# Patient Record
Sex: FEMALE | Race: WHITE | Employment: OTHER | URBAN - METROPOLITAN AREA
[De-identification: names, ages, dates, MRNs, and addresses within clinical notes are randomized per-mention and may not be internally consistent; named-entity substitution may affect disease eponyms.]

---

## 2017-02-15 ENCOUNTER — OFFICE VISIT (OUTPATIENT)
Dept: INTERNAL MEDICINE | Age: 72
End: 2017-02-15

## 2017-02-15 VITALS
BODY MASS INDEX: 25.52 KG/M2 | WEIGHT: 130 LBS | DIASTOLIC BLOOD PRESSURE: 70 MMHG | SYSTOLIC BLOOD PRESSURE: 122 MMHG | RESPIRATION RATE: 12 BRPM | HEIGHT: 60 IN | HEART RATE: 70 BPM

## 2017-02-15 DIAGNOSIS — R12 HEART BURN: Primary | ICD-10-CM

## 2017-02-15 PROCEDURE — 99213 OFFICE O/P EST LOW 20 MIN: CPT | Performed by: INTERNAL MEDICINE

## 2017-02-15 RX ORDER — RANITIDINE 150 MG/1
150 TABLET ORAL PRN
COMMUNITY
End: 2018-05-15

## 2017-02-15 RX ORDER — OMEPRAZOLE 20 MG/1
20 CAPSULE, DELAYED RELEASE ORAL DAILY
Qty: 90 CAPSULE | Refills: 3 | Status: SHIPPED | OUTPATIENT
Start: 2017-02-15 | End: 2018-05-15

## 2017-03-07 RX ORDER — LORAZEPAM 1 MG/1
TABLET ORAL
Qty: 90 TABLET | Refills: 0 | Status: SHIPPED | OUTPATIENT
Start: 2017-03-07 | End: 2017-07-11 | Stop reason: SDUPTHER

## 2017-03-28 ENCOUNTER — TELEPHONE (OUTPATIENT)
Dept: INTERNAL MEDICINE | Age: 72
End: 2017-03-28

## 2017-04-26 ENCOUNTER — TELEPHONE (OUTPATIENT)
Dept: INTERNAL MEDICINE | Age: 72
End: 2017-04-26

## 2017-06-02 ENCOUNTER — TELEPHONE (OUTPATIENT)
Dept: INTERNAL MEDICINE | Age: 72
End: 2017-06-02

## 2017-07-11 RX ORDER — LORAZEPAM 1 MG/1
TABLET ORAL
Qty: 90 TABLET | Refills: 0 | Status: SHIPPED | OUTPATIENT
Start: 2017-07-11 | End: 2017-12-23 | Stop reason: SDUPTHER

## 2017-09-14 RX ORDER — ATORVASTATIN CALCIUM 20 MG/1
TABLET, FILM COATED ORAL
Qty: 90 TABLET | Refills: 3 | Status: SHIPPED | OUTPATIENT
Start: 2017-09-14 | End: 2018-10-07 | Stop reason: SDUPTHER

## 2017-10-18 ENCOUNTER — OFFICE VISIT (OUTPATIENT)
Dept: INTERNAL MEDICINE | Age: 72
End: 2017-10-18

## 2017-10-18 VITALS
RESPIRATION RATE: 12 BRPM | DIASTOLIC BLOOD PRESSURE: 70 MMHG | SYSTOLIC BLOOD PRESSURE: 116 MMHG | HEART RATE: 70 BPM | HEIGHT: 60 IN | WEIGHT: 127 LBS | BODY MASS INDEX: 24.94 KG/M2

## 2017-10-18 DIAGNOSIS — Z00.00 MEDICARE ANNUAL WELLNESS VISIT, SUBSEQUENT: Primary | ICD-10-CM

## 2017-10-18 DIAGNOSIS — Z12.11 SPECIAL SCREENING FOR MALIGNANT NEOPLASMS, COLON: ICD-10-CM

## 2017-10-18 DIAGNOSIS — Z13.29 SCREENING FOR THYROID DISORDER: ICD-10-CM

## 2017-10-18 DIAGNOSIS — E78.5 HYPERLIPIDEMIA, UNSPECIFIED HYPERLIPIDEMIA TYPE: ICD-10-CM

## 2017-10-18 DIAGNOSIS — E55.9 VITAMIN D DEFICIENCY: ICD-10-CM

## 2017-10-18 LAB
BILIRUBIN, POC: NORMAL
BLOOD URINE, POC: NORMAL
CLARITY, POC: CLEAR
COLOR, POC: YELLOW
GLUCOSE URINE, POC: NORMAL
KETONES, POC: NORMAL
LEUKOCYTE EST, POC: NORMAL
NITRITE, POC: NORMAL
PH, POC: 5
PROTEIN, POC: NORMAL
SPECIFIC GRAVITY, POC: 1.02
UROBILINOGEN, POC: NORMAL

## 2017-10-18 PROCEDURE — 93000 ELECTROCARDIOGRAM COMPLETE: CPT | Performed by: INTERNAL MEDICINE

## 2017-10-18 PROCEDURE — G0439 PPPS, SUBSEQ VISIT: HCPCS | Performed by: INTERNAL MEDICINE

## 2017-10-18 PROCEDURE — 81002 URINALYSIS NONAUTO W/O SCOPE: CPT | Performed by: INTERNAL MEDICINE

## 2017-10-18 RX ORDER — CALCIUM CARBONATE 200(500)MG
1 TABLET,CHEWABLE ORAL DAILY
COMMUNITY
End: 2019-11-20

## 2017-10-18 ASSESSMENT — PATIENT HEALTH QUESTIONNAIRE - PHQ9
SUM OF ALL RESPONSES TO PHQ9 QUESTIONS 1 & 2: 0
SUM OF ALL RESPONSES TO PHQ QUESTIONS 1-9: 0
1. LITTLE INTEREST OR PLEASURE IN DOING THINGS: 0
2. FEELING DOWN, DEPRESSED OR HOPELESS: 0
SUM OF ALL RESPONSES TO PHQ QUESTIONS 1-9: 0

## 2017-10-18 ASSESSMENT — LIFESTYLE VARIABLES
HOW OFTEN DO YOU HAVE SIX OR MORE DRINKS ON ONE OCCASION: 0
HOW OFTEN DO YOU HAVE A DRINK CONTAINING ALCOHOL: 2
HOW OFTEN DURING THE LAST YEAR HAVE YOU HAD A FEELING OF GUILT OR REMORSE AFTER DRINKING: 0
HAVE YOU OR SOMEONE ELSE BEEN INJURED AS A RESULT OF YOUR DRINKING: 0
HOW MANY STANDARD DRINKS CONTAINING ALCOHOL DO YOU HAVE ON A TYPICAL DAY: 0
HOW OFTEN DURING THE LAST YEAR HAVE YOU NEEDED AN ALCOHOLIC DRINK FIRST THING IN THE MORNING TO GET YOURSELF GOING AFTER A NIGHT OF HEAVY DRINKING: 0
HOW OFTEN DURING THE LAST YEAR HAVE YOU FAILED TO DO WHAT WAS NORMALLY EXPECTED FROM YOU BECAUSE OF DRINKING: 0
AUDIT-C TOTAL SCORE: 2
HAS A RELATIVE, FRIEND, DOCTOR, OR ANOTHER HEALTH PROFESSIONAL EXPRESSED CONCERN ABOUT YOUR DRINKING OR SUGGESTED YOU CUT DOWN: 0
AUDIT TOTAL SCORE: 2
HOW OFTEN DURING THE LAST YEAR HAVE YOU FOUND THAT YOU WERE NOT ABLE TO STOP DRINKING ONCE YOU HAD STARTED: 0
HOW OFTEN DURING THE LAST YEAR HAVE YOU BEEN UNABLE TO REMEMBER WHAT HAPPENED THE NIGHT BEFORE BECAUSE YOU HAD BEEN DRINKING: 0

## 2017-10-18 ASSESSMENT — ANXIETY QUESTIONNAIRES: GAD7 TOTAL SCORE: 1

## 2017-10-18 NOTE — PROGRESS NOTES
 LORazepam (ATIVAN) 1 MG tablet TAKE ONE TABLET BY MOUTH EVERY EVENING 90 tablet 0    Calcium Carbonate-Vitamin D (CALCIUM + D) 600-200 MG-UNIT TABS Take  by mouth 2 times daily.  atorvastatin (LIPITOR) 20 MG tablet TAKE ONE TABLET BY MOUTH DAILY 90 tablet 3    ranitidine (ZANTAC) 150 MG tablet Take 150 mg by mouth as needed for Heartburn      omeprazole (PRILOSEC) 20 MG delayed release capsule Take 1 capsule by mouth daily 90 capsule 3     No current facility-administered medications for this visit. No Known Allergies    Review of Systems:     Immunization History   Administered Date(s) Administered    Influenza, High Dose 10/15/2017    Pneumococcal 13-valent Conjugate ([id number]) 08/07/2016    Pneumococcal Polysaccharide ([id number]) 02/23/2011, 10/15/2017    Td 04/15/2008     Eye Exam:  November, 2017 by Dr. Perri Del Real  Chest: Denies: cough, hemoptysis, shortness of breath, pleuritic chest pain, wheezing  Cardiovascular: Denies: chest pain, dyspnea on exertion, orthopnea, paroxysmal nocturnal dyspnea, edema, palpitations  Abdomen: no abdominal pain, change in bowel habits, or black or bloody stools  Colonoscopy:  October, 2013 by Dr. Meliton Alvarado was normal. To be repeated 2023. Fall Risk low  ADL without assistance   Mammography:March 8, 2017   DEXA: July, 2014 revealed a lumbar T score of - 1.7 and a hip T score of - 1.9   Pelvic and PAP: 2016 by Dr. Kenna Schuster   Other:  N/A      Physical Exam:  General appearance: alert, appears stated age and cooperative  /70 (Site: Left Arm, Position: Sitting, Cuff Size: Medium Adult)   Pulse 70   Resp 12   Ht 4' 11.5\" (1.511 m)   Wt 127 lb (57.6 kg)   BMI 25.22 kg/m²   Eyes: conjunctivae/corneas clear. PERRL, EOM's intact. Fundi benign. Ears: normal TM's and external ear canals both ears  Nose: Nares normal. Septum midline. Mucosa normal. No drainage or sinus tenderness.   Throat: no abnormalities  Neck: no adenopathy, no carotid bruit, no JVD, supple, symmetrical, trachea midline and thyroid not enlarged, symmetric, no tenderness/mass/nodules  Nodes:no adenopathy palpable  Breasts:Breasts symmetrical, skin without lesion(s), no nipple retraction or dimpling, no nipple discharge, no masses palpated, no axillary or supraclavicular adenopathy  Lungs: clear to auscultation bilaterally  Heart: regular rate and rhythm, S1, S2 normal, no murmur, click, rub or gallop  Abdomen: soft, non-tender; bowel sounds normal; no masses,  no organomegaly  Extremities: extremities normal, atraumatic, no cyanosis or edema  Neurological: Gait normal. Reflexes normal and symmetric.  Sensation grossly normal  Pulse: radial=4/4, femoral=4/4, popliteal=4/4, dorsalis pedis=4/4,   Skin: normal coloration and turgor, no rashes, no suspicious skin lesions noted  Psych: normal    Lab Review: not applicable  Assessment: Stable     Plan:              ECG, U/A, fasting labs, hemoccults  Hyperlipidemia - await labs, same regimen pending results  Vitamin D deficiency - stable, continue same regimen       Sammie Kehr, MD

## 2017-10-26 DIAGNOSIS — Z00.00 PERIODIC HEALTH ASSESSMENT, GENERAL SCREENING, ADULT: Primary | ICD-10-CM

## 2017-10-26 DIAGNOSIS — E55.9 VITAMIN D DEFICIENCY: ICD-10-CM

## 2017-10-26 DIAGNOSIS — E78.5 HYPERLIPIDEMIA, UNSPECIFIED HYPERLIPIDEMIA TYPE: ICD-10-CM

## 2017-10-30 DIAGNOSIS — E55.9 VITAMIN D DEFICIENCY: ICD-10-CM

## 2017-10-30 DIAGNOSIS — E78.5 HYPERLIPIDEMIA, UNSPECIFIED HYPERLIPIDEMIA TYPE: ICD-10-CM

## 2017-10-30 DIAGNOSIS — Z00.00 PERIODIC HEALTH ASSESSMENT, GENERAL SCREENING, ADULT: ICD-10-CM

## 2017-10-30 LAB
A/G RATIO: 2.3 (ref 1.1–2.2)
ALBUMIN SERPL-MCNC: 4.3 G/DL (ref 3.4–5)
ALP BLD-CCNC: 67 U/L (ref 40–129)
ALT SERPL-CCNC: 18 U/L (ref 10–40)
ANION GAP SERPL CALCULATED.3IONS-SCNC: 13 MMOL/L (ref 3–16)
AST SERPL-CCNC: 18 U/L (ref 15–37)
BASOPHILS ABSOLUTE: 0 K/UL (ref 0–0.2)
BASOPHILS RELATIVE PERCENT: 1 %
BILIRUB SERPL-MCNC: 0.3 MG/DL (ref 0–1)
BUN BLDV-MCNC: 17 MG/DL (ref 7–20)
CALCIUM SERPL-MCNC: 9.6 MG/DL (ref 8.3–10.6)
CHLORIDE BLD-SCNC: 103 MMOL/L (ref 99–110)
CHOLESTEROL, TOTAL: 188 MG/DL (ref 0–199)
CO2: 28 MMOL/L (ref 21–32)
CREAT SERPL-MCNC: 0.5 MG/DL (ref 0.6–1.2)
EOSINOPHILS ABSOLUTE: 0.1 K/UL (ref 0–0.6)
EOSINOPHILS RELATIVE PERCENT: 2.5 %
GFR AFRICAN AMERICAN: >60
GFR NON-AFRICAN AMERICAN: >60
GLOBULIN: 1.9 G/DL
GLUCOSE BLD-MCNC: 94 MG/DL (ref 70–99)
HCT VFR BLD CALC: 41.4 % (ref 36–48)
HDLC SERPL-MCNC: 58 MG/DL (ref 40–60)
HEMOGLOBIN: 13.4 G/DL (ref 12–16)
LDL CHOLESTEROL CALCULATED: 100 MG/DL
LYMPHOCYTES ABSOLUTE: 2 K/UL (ref 1–5.1)
LYMPHOCYTES RELATIVE PERCENT: 41 %
MCH RBC QN AUTO: 28.4 PG (ref 26–34)
MCHC RBC AUTO-ENTMCNC: 32.3 G/DL (ref 31–36)
MCV RBC AUTO: 88.1 FL (ref 80–100)
MONOCYTES ABSOLUTE: 0.5 K/UL (ref 0–1.3)
MONOCYTES RELATIVE PERCENT: 10.3 %
NEUTROPHILS ABSOLUTE: 2.2 K/UL (ref 1.7–7.7)
NEUTROPHILS RELATIVE PERCENT: 45.2 %
PDW BLD-RTO: 13.8 % (ref 12.4–15.4)
PLATELET # BLD: 195 K/UL (ref 135–450)
PMV BLD AUTO: 9.2 FL (ref 5–10.5)
POTASSIUM SERPL-SCNC: 4.9 MMOL/L (ref 3.5–5.1)
RBC # BLD: 4.7 M/UL (ref 4–5.2)
SODIUM BLD-SCNC: 144 MMOL/L (ref 136–145)
TOTAL CK: 57 U/L (ref 26–192)
TOTAL PROTEIN: 6.2 G/DL (ref 6.4–8.2)
TRIGL SERPL-MCNC: 149 MG/DL (ref 0–150)
TSH SERPL DL<=0.05 MIU/L-ACNC: 2.1 UIU/ML (ref 0.27–4.2)
VLDLC SERPL CALC-MCNC: 30 MG/DL
WBC # BLD: 4.8 K/UL (ref 4–11)

## 2017-11-03 DIAGNOSIS — Z12.11 SPECIAL SCREENING FOR MALIGNANT NEOPLASMS, COLON: ICD-10-CM

## 2017-11-03 LAB
CONTROL: NORMAL
HEMOCCULT STL QL: NEGATIVE

## 2017-12-26 RX ORDER — LORAZEPAM 1 MG/1
TABLET ORAL
Qty: 90 TABLET | Refills: 0 | Status: SHIPPED | OUTPATIENT
Start: 2017-12-26 | End: 2018-09-27 | Stop reason: SDUPTHER

## 2018-04-09 ENCOUNTER — TELEPHONE (OUTPATIENT)
Dept: INTERNAL MEDICINE | Age: 73
End: 2018-04-09

## 2018-04-17 ENCOUNTER — TELEPHONE (OUTPATIENT)
Dept: INTERNAL MEDICINE | Age: 73
End: 2018-04-17

## 2018-05-15 ENCOUNTER — OFFICE VISIT (OUTPATIENT)
Dept: ENT CLINIC | Age: 73
End: 2018-05-15

## 2018-05-15 VITALS
WEIGHT: 130.2 LBS | BODY MASS INDEX: 25.56 KG/M2 | TEMPERATURE: 97.3 F | HEART RATE: 85 BPM | DIASTOLIC BLOOD PRESSURE: 70 MMHG | SYSTOLIC BLOOD PRESSURE: 119 MMHG | HEIGHT: 60 IN

## 2018-05-15 DIAGNOSIS — H61.21 IMPACTED CERUMEN OF RIGHT EAR: Primary | ICD-10-CM

## 2018-05-15 DIAGNOSIS — H90.2 EXTERNAL EAR CONDUCTIVE HEARING LOSS: ICD-10-CM

## 2018-05-15 PROCEDURE — 69210 REMOVE IMPACTED EAR WAX UNI: CPT | Performed by: OTOLARYNGOLOGY

## 2018-09-27 DIAGNOSIS — F41.9 ANXIETY: Primary | ICD-10-CM

## 2018-09-27 RX ORDER — LORAZEPAM 1 MG/1
TABLET ORAL
Qty: 90 TABLET | Refills: 0 | Status: SHIPPED | OUTPATIENT
Start: 2018-09-27 | End: 2018-12-26

## 2018-10-07 RX ORDER — ATORVASTATIN CALCIUM 20 MG/1
TABLET, FILM COATED ORAL
Qty: 90 TABLET | Refills: 2 | Status: SHIPPED | OUTPATIENT
Start: 2018-10-07 | End: 2019-07-20 | Stop reason: SDUPTHER

## 2018-10-26 ENCOUNTER — OFFICE VISIT (OUTPATIENT)
Dept: INTERNAL MEDICINE CLINIC | Age: 73
End: 2018-10-26
Payer: MEDICARE

## 2018-10-26 VITALS
RESPIRATION RATE: 12 BRPM | WEIGHT: 134 LBS | HEART RATE: 80 BPM | SYSTOLIC BLOOD PRESSURE: 112 MMHG | DIASTOLIC BLOOD PRESSURE: 66 MMHG | BODY MASS INDEX: 26.31 KG/M2 | HEIGHT: 60 IN

## 2018-10-26 DIAGNOSIS — Z00.00 MEDICARE ANNUAL WELLNESS VISIT, SUBSEQUENT: Primary | ICD-10-CM

## 2018-10-26 DIAGNOSIS — Z00.00 PERIODIC HEALTH ASSESSMENT, GENERAL SCREENING, ADULT: ICD-10-CM

## 2018-10-26 DIAGNOSIS — Z12.11 SPECIAL SCREENING FOR MALIGNANT NEOPLASM OF COLON: ICD-10-CM

## 2018-10-26 DIAGNOSIS — E78.5 HYPERLIPIDEMIA, UNSPECIFIED HYPERLIPIDEMIA TYPE: ICD-10-CM

## 2018-10-26 PROCEDURE — 93000 ELECTROCARDIOGRAM COMPLETE: CPT | Performed by: INTERNAL MEDICINE

## 2018-10-26 PROCEDURE — G0439 PPPS, SUBSEQ VISIT: HCPCS | Performed by: INTERNAL MEDICINE

## 2018-10-26 PROCEDURE — 81002 URINALYSIS NONAUTO W/O SCOPE: CPT | Performed by: INTERNAL MEDICINE

## 2018-10-26 ASSESSMENT — LIFESTYLE VARIABLES
HAS A RELATIVE, FRIEND, DOCTOR, OR ANOTHER HEALTH PROFESSIONAL EXPRESSED CONCERN ABOUT YOUR DRINKING OR SUGGESTED YOU CUT DOWN: 0
HOW OFTEN DO YOU HAVE A DRINK CONTAINING ALCOHOL: 2
AUDIT TOTAL SCORE: 2
HOW OFTEN DURING THE LAST YEAR HAVE YOU FAILED TO DO WHAT WAS NORMALLY EXPECTED FROM YOU BECAUSE OF DRINKING: 0
HOW MANY STANDARD DRINKS CONTAINING ALCOHOL DO YOU HAVE ON A TYPICAL DAY: 0
HOW OFTEN DURING THE LAST YEAR HAVE YOU HAD A FEELING OF GUILT OR REMORSE AFTER DRINKING: 0
HAVE YOU OR SOMEONE ELSE BEEN INJURED AS A RESULT OF YOUR DRINKING: 0
HOW OFTEN DO YOU HAVE SIX OR MORE DRINKS ON ONE OCCASION: 0
AUDIT-C TOTAL SCORE: 2
HOW OFTEN DURING THE LAST YEAR HAVE YOU BEEN UNABLE TO REMEMBER WHAT HAPPENED THE NIGHT BEFORE BECAUSE YOU HAD BEEN DRINKING: 0
HOW OFTEN DURING THE LAST YEAR HAVE YOU NEEDED AN ALCOHOLIC DRINK FIRST THING IN THE MORNING TO GET YOURSELF GOING AFTER A NIGHT OF HEAVY DRINKING: 0
HOW OFTEN DURING THE LAST YEAR HAVE YOU FOUND THAT YOU WERE NOT ABLE TO STOP DRINKING ONCE YOU HAD STARTED: 0

## 2018-10-26 ASSESSMENT — PATIENT HEALTH QUESTIONNAIRE - PHQ9
SUM OF ALL RESPONSES TO PHQ QUESTIONS 1-9: 0
SUM OF ALL RESPONSES TO PHQ QUESTIONS 1-9: 0

## 2018-10-26 ASSESSMENT — ANXIETY QUESTIONNAIRES: GAD7 TOTAL SCORE: 2

## 2018-11-12 DIAGNOSIS — Z12.11 SPECIAL SCREENING FOR MALIGNANT NEOPLASMS, COLON: Primary | ICD-10-CM

## 2018-11-20 DIAGNOSIS — Z12.11 SPECIAL SCREENING FOR MALIGNANT NEOPLASMS, COLON: ICD-10-CM

## 2018-11-20 LAB
CONTROL: NORMAL
HEMOCCULT STL QL: NORMAL

## 2018-11-20 PROCEDURE — 82274 ASSAY TEST FOR BLOOD FECAL: CPT | Performed by: INTERNAL MEDICINE

## 2018-12-05 DIAGNOSIS — M81.0 OSTEOPOROSIS, UNSPECIFIED OSTEOPOROSIS TYPE, UNSPECIFIED PATHOLOGICAL FRACTURE PRESENCE: Primary | ICD-10-CM

## 2018-12-05 DIAGNOSIS — E78.5 HYPERLIPIDEMIA, UNSPECIFIED HYPERLIPIDEMIA TYPE: ICD-10-CM

## 2018-12-05 DIAGNOSIS — Z00.00 PERIODIC HEALTH ASSESSMENT, GENERAL SCREENING, ADULT: ICD-10-CM

## 2018-12-05 LAB
A/G RATIO: 3.2 (ref 1.1–2.2)
ALBUMIN SERPL-MCNC: 4.8 G/DL (ref 3.4–5)
ALP BLD-CCNC: 69 U/L (ref 40–129)
ALT SERPL-CCNC: 22 U/L (ref 10–40)
ANION GAP SERPL CALCULATED.3IONS-SCNC: 16 MMOL/L (ref 3–16)
AST SERPL-CCNC: 21 U/L (ref 15–37)
BASOPHILS ABSOLUTE: 0 K/UL (ref 0–0.2)
BASOPHILS RELATIVE PERCENT: 1 %
BILIRUB SERPL-MCNC: 0.4 MG/DL (ref 0–1)
BUN BLDV-MCNC: 13 MG/DL (ref 7–20)
CALCIUM SERPL-MCNC: 9.7 MG/DL (ref 8.3–10.6)
CHLORIDE BLD-SCNC: 102 MMOL/L (ref 99–110)
CHOLESTEROL, TOTAL: 197 MG/DL (ref 0–199)
CO2: 25 MMOL/L (ref 21–32)
CREAT SERPL-MCNC: 0.7 MG/DL (ref 0.6–1.2)
EOSINOPHILS ABSOLUTE: 0.1 K/UL (ref 0–0.6)
EOSINOPHILS RELATIVE PERCENT: 1.7 %
GFR AFRICAN AMERICAN: >60
GFR NON-AFRICAN AMERICAN: >60
GLOBULIN: 1.5 G/DL
GLUCOSE BLD-MCNC: 94 MG/DL (ref 70–99)
HCT VFR BLD CALC: 39.6 % (ref 36–48)
HDLC SERPL-MCNC: 62 MG/DL (ref 40–60)
HEMOGLOBIN: 12.9 G/DL (ref 12–16)
LDL CHOLESTEROL CALCULATED: 113 MG/DL
LYMPHOCYTES ABSOLUTE: 1.8 K/UL (ref 1–5.1)
LYMPHOCYTES RELATIVE PERCENT: 40.6 %
MCH RBC QN AUTO: 28.7 PG (ref 26–34)
MCHC RBC AUTO-ENTMCNC: 32.5 G/DL (ref 31–36)
MCV RBC AUTO: 88.3 FL (ref 80–100)
MONOCYTES ABSOLUTE: 0.5 K/UL (ref 0–1.3)
MONOCYTES RELATIVE PERCENT: 10.5 %
NEUTROPHILS ABSOLUTE: 2.1 K/UL (ref 1.7–7.7)
NEUTROPHILS RELATIVE PERCENT: 46.2 %
PDW BLD-RTO: 14.3 % (ref 12.4–15.4)
PLATELET # BLD: 201 K/UL (ref 135–450)
PMV BLD AUTO: 9 FL (ref 5–10.5)
POTASSIUM SERPL-SCNC: 4.7 MMOL/L (ref 3.5–5.1)
RBC # BLD: 4.49 M/UL (ref 4–5.2)
SODIUM BLD-SCNC: 143 MMOL/L (ref 136–145)
TOTAL CK: 75 U/L (ref 26–192)
TOTAL PROTEIN: 6.3 G/DL (ref 6.4–8.2)
TRIGL SERPL-MCNC: 109 MG/DL (ref 0–150)
TSH SERPL DL<=0.05 MIU/L-ACNC: 1.69 UIU/ML (ref 0.27–4.2)
VLDLC SERPL CALC-MCNC: 22 MG/DL
WBC # BLD: 4.5 K/UL (ref 4–11)

## 2019-01-11 ENCOUNTER — TELEPHONE (OUTPATIENT)
Dept: INTERNAL MEDICINE CLINIC | Age: 74
End: 2019-01-11

## 2019-02-05 ENCOUNTER — HOSPITAL ENCOUNTER (OUTPATIENT)
Dept: GENERAL RADIOLOGY | Age: 74
Discharge: HOME | End: 2019-02-05
Payer: MEDICARE

## 2019-02-05 ENCOUNTER — HOSPITAL ENCOUNTER (OUTPATIENT)
Age: 74
Discharge: HOME | End: 2019-02-05
Payer: MEDICARE

## 2019-02-05 ENCOUNTER — OFFICE VISIT (OUTPATIENT)
Dept: INTERNAL MEDICINE CLINIC | Age: 74
End: 2019-02-05
Payer: MEDICARE

## 2019-02-05 VITALS
BODY MASS INDEX: 26.11 KG/M2 | HEIGHT: 60 IN | RESPIRATION RATE: 12 BRPM | WEIGHT: 133 LBS | SYSTOLIC BLOOD PRESSURE: 126 MMHG | HEART RATE: 68 BPM | DIASTOLIC BLOOD PRESSURE: 78 MMHG

## 2019-02-05 DIAGNOSIS — M54.9 BACK PAIN, UNSPECIFIED BACK LOCATION, UNSPECIFIED BACK PAIN LATERALITY, UNSPECIFIED CHRONICITY: Primary | ICD-10-CM

## 2019-02-05 DIAGNOSIS — M54.9 BACK PAIN, UNSPECIFIED BACK LOCATION, UNSPECIFIED BACK PAIN LATERALITY, UNSPECIFIED CHRONICITY: ICD-10-CM

## 2019-02-05 PROCEDURE — 99213 OFFICE O/P EST LOW 20 MIN: CPT | Performed by: INTERNAL MEDICINE

## 2019-02-05 PROCEDURE — 72100 X-RAY EXAM L-S SPINE 2/3 VWS: CPT

## 2019-02-05 RX ORDER — LORAZEPAM 1 MG/1
1 TABLET ORAL EVERY 6 HOURS PRN
COMMUNITY
End: 2019-04-25 | Stop reason: SDUPTHER

## 2019-02-07 DIAGNOSIS — M54.50 ACUTE LOW BACK PAIN WITHOUT SCIATICA, UNSPECIFIED BACK PAIN LATERALITY: ICD-10-CM

## 2019-02-07 DIAGNOSIS — M47.817 ARTHRITIS OF LUMBOSACRAL SPINE: Primary | ICD-10-CM

## 2019-04-25 DIAGNOSIS — F41.9 ANXIETY: Primary | ICD-10-CM

## 2019-04-25 RX ORDER — LORAZEPAM 1 MG/1
TABLET ORAL
Qty: 90 TABLET | Refills: 0 | Status: SHIPPED | OUTPATIENT
Start: 2019-04-25 | End: 2019-07-24

## 2019-06-24 ENCOUNTER — TELEPHONE (OUTPATIENT)
Dept: INTERNAL MEDICINE CLINIC | Age: 74
End: 2019-06-24

## 2019-06-24 NOTE — TELEPHONE ENCOUNTER
She got a letter saying she is due for a 10 year colonoscopy   I found one in her chart from 2013 by Dr. Miley Butts   I told her it has not been 10 years  She does not remember getting that colonoscopy   Does she need the colonoscopy? She asked what age do they stop doing them?

## 2019-07-20 RX ORDER — ATORVASTATIN CALCIUM 20 MG/1
TABLET, FILM COATED ORAL
Qty: 90 TABLET | Refills: 1 | Status: SHIPPED | OUTPATIENT
Start: 2019-07-20 | End: 2020-02-02

## 2019-09-23 ENCOUNTER — TELEPHONE (OUTPATIENT)
Dept: INTERNAL MEDICINE CLINIC | Age: 74
End: 2019-09-23

## 2019-10-04 ENCOUNTER — TELEPHONE (OUTPATIENT)
Dept: INTERNAL MEDICINE CLINIC | Age: 74
End: 2019-10-04

## 2019-10-11 LAB — PAP SMEAR: NORMAL

## 2019-11-20 ENCOUNTER — OFFICE VISIT (OUTPATIENT)
Dept: INTERNAL MEDICINE CLINIC | Age: 74
End: 2019-11-20
Payer: MEDICARE

## 2019-11-20 VITALS
BODY MASS INDEX: 25.91 KG/M2 | SYSTOLIC BLOOD PRESSURE: 110 MMHG | WEIGHT: 132 LBS | HEIGHT: 60 IN | HEART RATE: 82 BPM | RESPIRATION RATE: 12 BRPM | DIASTOLIC BLOOD PRESSURE: 70 MMHG

## 2019-11-20 DIAGNOSIS — Z13.29 SCREENING FOR THYROID DISORDER: ICD-10-CM

## 2019-11-20 DIAGNOSIS — Z00.00 MEDICARE ANNUAL WELLNESS VISIT, SUBSEQUENT: Primary | ICD-10-CM

## 2019-11-20 DIAGNOSIS — Z12.11 SPECIAL SCREENING FOR MALIGNANT NEOPLASMS, COLON: ICD-10-CM

## 2019-11-20 DIAGNOSIS — M81.0 OSTEOPOROSIS, UNSPECIFIED OSTEOPOROSIS TYPE, UNSPECIFIED PATHOLOGICAL FRACTURE PRESENCE: ICD-10-CM

## 2019-11-20 DIAGNOSIS — Z00.00 PERIODIC HEALTH ASSESSMENT, GENERAL SCREENING, ADULT: ICD-10-CM

## 2019-11-20 DIAGNOSIS — E78.5 HYPERLIPIDEMIA, UNSPECIFIED HYPERLIPIDEMIA TYPE: ICD-10-CM

## 2019-11-20 LAB
BILIRUBIN, POC: NORMAL
BLOOD URINE, POC: NORMAL
CLARITY, POC: CLEAR
COLOR, POC: YELLOW
GLUCOSE URINE, POC: NORMAL
KETONES, POC: NORMAL
LEUKOCYTE EST, POC: NORMAL
NITRITE, POC: NORMAL
PH, POC: 5
PROTEIN, POC: NORMAL
SPECIFIC GRAVITY, POC: 1.01
UROBILINOGEN, POC: NORMAL

## 2019-11-20 PROCEDURE — 93000 ELECTROCARDIOGRAM COMPLETE: CPT | Performed by: INTERNAL MEDICINE

## 2019-11-20 PROCEDURE — G0439 PPPS, SUBSEQ VISIT: HCPCS | Performed by: INTERNAL MEDICINE

## 2019-11-20 PROCEDURE — 81002 URINALYSIS NONAUTO W/O SCOPE: CPT | Performed by: INTERNAL MEDICINE

## 2019-11-20 ASSESSMENT — LIFESTYLE VARIABLES
HOW OFTEN DURING THE LAST YEAR HAVE YOU HAD A FEELING OF GUILT OR REMORSE AFTER DRINKING: 0
HOW OFTEN DURING THE LAST YEAR HAVE YOU NEEDED AN ALCOHOLIC DRINK FIRST THING IN THE MORNING TO GET YOURSELF GOING AFTER A NIGHT OF HEAVY DRINKING: 0
HOW OFTEN DURING THE LAST YEAR HAVE YOU FOUND THAT YOU WERE NOT ABLE TO STOP DRINKING ONCE YOU HAD STARTED: 0
HOW OFTEN DURING THE LAST YEAR HAVE YOU FAILED TO DO WHAT WAS NORMALLY EXPECTED FROM YOU BECAUSE OF DRINKING: 0
HOW MANY STANDARD DRINKS CONTAINING ALCOHOL DO YOU HAVE ON A TYPICAL DAY: 0
AUDIT-C TOTAL SCORE: 2
HOW OFTEN DURING THE LAST YEAR HAVE YOU BEEN UNABLE TO REMEMBER WHAT HAPPENED THE NIGHT BEFORE BECAUSE YOU HAD BEEN DRINKING: 0
AUDIT TOTAL SCORE: 2
HOW OFTEN DO YOU HAVE A DRINK CONTAINING ALCOHOL: 2
HAS A RELATIVE, FRIEND, DOCTOR, OR ANOTHER HEALTH PROFESSIONAL EXPRESSED CONCERN ABOUT YOUR DRINKING OR SUGGESTED YOU CUT DOWN: 0
HOW OFTEN DO YOU HAVE SIX OR MORE DRINKS ON ONE OCCASION: 0
HAVE YOU OR SOMEONE ELSE BEEN INJURED AS A RESULT OF YOUR DRINKING: 0

## 2019-11-20 ASSESSMENT — PATIENT HEALTH QUESTIONNAIRE - PHQ9
SUM OF ALL RESPONSES TO PHQ QUESTIONS 1-9: 0
SUM OF ALL RESPONSES TO PHQ QUESTIONS 1-9: 0

## 2019-11-29 DIAGNOSIS — Z12.11 SPECIAL SCREENING FOR MALIGNANT NEOPLASMS, COLON: ICD-10-CM

## 2019-11-29 LAB
CONTROL: NORMAL
HEMOCCULT STL QL: NORMAL

## 2019-11-29 PROCEDURE — 82274 ASSAY TEST FOR BLOOD FECAL: CPT | Performed by: INTERNAL MEDICINE

## 2019-12-19 DIAGNOSIS — F41.9 ANXIETY: Primary | ICD-10-CM

## 2019-12-19 RX ORDER — LORAZEPAM 1 MG/1
TABLET ORAL
Qty: 90 TABLET | Refills: 0 | Status: SHIPPED | OUTPATIENT
Start: 2019-12-19 | End: 2020-03-18

## 2020-01-03 DIAGNOSIS — M81.0 OSTEOPOROSIS, UNSPECIFIED OSTEOPOROSIS TYPE, UNSPECIFIED PATHOLOGICAL FRACTURE PRESENCE: ICD-10-CM

## 2020-01-03 DIAGNOSIS — Z13.29 SCREENING FOR THYROID DISORDER: ICD-10-CM

## 2020-01-03 DIAGNOSIS — Z00.00 PERIODIC HEALTH ASSESSMENT, GENERAL SCREENING, ADULT: ICD-10-CM

## 2020-01-03 DIAGNOSIS — E78.5 HYPERLIPIDEMIA, UNSPECIFIED HYPERLIPIDEMIA TYPE: ICD-10-CM

## 2020-01-03 LAB
A/G RATIO: 2.6 (ref 1.1–2.2)
ALBUMIN SERPL-MCNC: 4.1 G/DL (ref 3.4–5)
ALP BLD-CCNC: 57 U/L (ref 40–129)
ALT SERPL-CCNC: 28 U/L (ref 10–40)
ANION GAP SERPL CALCULATED.3IONS-SCNC: 12 MMOL/L (ref 3–16)
AST SERPL-CCNC: 22 U/L (ref 15–37)
BASOPHILS ABSOLUTE: 0 K/UL (ref 0–0.2)
BASOPHILS RELATIVE PERCENT: 0.8 %
BILIRUB SERPL-MCNC: 0.3 MG/DL (ref 0–1)
BUN BLDV-MCNC: 17 MG/DL (ref 7–20)
CALCIUM SERPL-MCNC: 9.4 MG/DL (ref 8.3–10.6)
CHLORIDE BLD-SCNC: 103 MMOL/L (ref 99–110)
CHOLESTEROL, TOTAL: 197 MG/DL (ref 0–199)
CO2: 27 MMOL/L (ref 21–32)
CREAT SERPL-MCNC: 0.6 MG/DL (ref 0.6–1.2)
EOSINOPHILS ABSOLUTE: 0.1 K/UL (ref 0–0.6)
EOSINOPHILS RELATIVE PERCENT: 2.1 %
GFR AFRICAN AMERICAN: >60
GFR NON-AFRICAN AMERICAN: >60
GLOBULIN: 1.6 G/DL
GLUCOSE BLD-MCNC: 97 MG/DL (ref 70–99)
HCT VFR BLD CALC: 38.1 % (ref 36–48)
HDLC SERPL-MCNC: 67 MG/DL (ref 40–60)
HEMOGLOBIN: 12.6 G/DL (ref 12–16)
LDL CHOLESTEROL CALCULATED: 101 MG/DL
LYMPHOCYTES ABSOLUTE: 1.8 K/UL (ref 1–5.1)
LYMPHOCYTES RELATIVE PERCENT: 42.8 %
MCH RBC QN AUTO: 29.2 PG (ref 26–34)
MCHC RBC AUTO-ENTMCNC: 33.2 G/DL (ref 31–36)
MCV RBC AUTO: 88 FL (ref 80–100)
MONOCYTES ABSOLUTE: 0.5 K/UL (ref 0–1.3)
MONOCYTES RELATIVE PERCENT: 11 %
NEUTROPHILS ABSOLUTE: 1.8 K/UL (ref 1.7–7.7)
NEUTROPHILS RELATIVE PERCENT: 43.3 %
PDW BLD-RTO: 14 % (ref 12.4–15.4)
PLATELET # BLD: 179 K/UL (ref 135–450)
PMV BLD AUTO: 8.6 FL (ref 5–10.5)
POTASSIUM SERPL-SCNC: 4.4 MMOL/L (ref 3.5–5.1)
RBC # BLD: 4.32 M/UL (ref 4–5.2)
SODIUM BLD-SCNC: 142 MMOL/L (ref 136–145)
TOTAL CK: 53 U/L (ref 26–192)
TOTAL PROTEIN: 5.7 G/DL (ref 6.4–8.2)
TRIGL SERPL-MCNC: 146 MG/DL (ref 0–150)
TSH SERPL DL<=0.05 MIU/L-ACNC: 2.22 UIU/ML (ref 0.27–4.2)
VITAMIN D 25-HYDROXY: 48.6 NG/ML
VLDLC SERPL CALC-MCNC: 29 MG/DL
WBC # BLD: 4.2 K/UL (ref 4–11)

## 2020-02-02 RX ORDER — ATORVASTATIN CALCIUM 20 MG/1
TABLET, FILM COATED ORAL
Qty: 90 TABLET | Refills: 0 | Status: SHIPPED | OUTPATIENT
Start: 2020-02-02 | End: 2020-05-02

## 2020-05-02 RX ORDER — ATORVASTATIN CALCIUM 20 MG/1
TABLET, FILM COATED ORAL
Qty: 90 TABLET | Refills: 0 | Status: SHIPPED | OUTPATIENT
Start: 2020-05-02 | End: 2020-08-22

## 2020-05-27 ENCOUNTER — TELEPHONE (OUTPATIENT)
Dept: INTERNAL MEDICINE CLINIC | Age: 75
End: 2020-05-27

## 2020-05-27 ENCOUNTER — VIRTUAL VISIT (OUTPATIENT)
Dept: INTERNAL MEDICINE CLINIC | Age: 75
End: 2020-05-27
Payer: MEDICARE

## 2020-05-27 PROCEDURE — 99442 PR PHYS/QHP TELEPHONE EVALUATION 11-20 MIN: CPT | Performed by: INTERNAL MEDICINE

## 2020-05-27 RX ORDER — OXYBUTYNIN CHLORIDE 5 MG/1
5 TABLET ORAL NIGHTLY
COMMUNITY
End: 2020-11-24

## 2020-05-27 NOTE — PROGRESS NOTES
this probably represented a dropped bladder and we would phone in oxybutynin 5 mg po q hs that she should begin and to make a follow up visit with Dr. Ivelisse Le the urogynecologist to see whether or not this medication had worked. I affirm this is a Patient Initiated Episode with a Patient who has not had a related appointment within my department in the past 7 days or scheduled within the next 24 hours.     Patient identification was verified at the start of the visit: Yes    Total Time: minutes: 11-20 minutes    Note: not billable if this call serves to triage the patient into an appointment for the relevant concern      DAR Carlisle

## 2020-07-29 ENCOUNTER — TELEPHONE (OUTPATIENT)
Dept: INTERNAL MEDICINE CLINIC | Age: 75
End: 2020-07-29

## 2020-07-29 NOTE — TELEPHONE ENCOUNTER
Has a rash all over her body. Talked to Dr. Shelbi Lerma on Saturday and was given Claritin and Benadryl - it is not helping. Would like an appointment. Could this be a symptom of corona?

## 2020-08-22 RX ORDER — ATORVASTATIN CALCIUM 20 MG/1
TABLET, FILM COATED ORAL
Qty: 90 TABLET | Refills: 0 | Status: SHIPPED | OUTPATIENT
Start: 2020-08-22 | End: 2020-11-25 | Stop reason: SDUPTHER

## 2020-09-14 ENCOUNTER — TELEPHONE (OUTPATIENT)
Dept: FAMILY MEDICINE CLINIC | Age: 75
End: 2020-09-14

## 2020-09-15 NOTE — TELEPHONE ENCOUNTER
Called and spoke to patient, Dr. Zohreh Huang @ [phone]. Verbally understood.
Patient states Dr. Morgan Evangelista has stated   she was already accepted by the doctor as a new patient to get a physical.   No appointments available upon searching. Please advise and call patient   back to get scheduled. No messages backing this up. Dr. Roman Petty is not accepting new patients at this time.      Please advise
Regine Salazarr
or new loss of taste or smell? No  (Service Expert  click yes below to proceed with [employer] As Usual   Scheduling)? Yes  (Service Expert  click yes below to proceed with [employer] As Usual   Scheduling)? Yes  (Is the patient requesting to be seen urgently for their symptoms?)? No  (If the patient is female   ask this question) Are you requesting a pap smear with your physical   exam? No  (Patient is Medicare and requesting Annual Wellness Visit)?  No

## 2020-09-16 ENCOUNTER — TELEPHONE (OUTPATIENT)
Dept: FAMILY MEDICINE CLINIC | Age: 75
End: 2020-09-16

## 2020-09-16 NOTE — TELEPHONE ENCOUNTER
----- Message from Anjali Cárdenas sent at 9/15/2020  4:17 PM EDT -----  Subject: Appointment Request    Reason for Call: New Patient Request Appointment    QUESTIONS  Type of Appointment? New Patient/New to Provider  Reason for appointment request? No appointments available during search  Additional Information for Provider? Pt is switching from [facility] to   Kim Yang. Requesting Annual Wellness check up. Questions related to her   bladder. Screened green. ---------------------------------------------------------------------------  --------------  [employer] INFO  What is the best way for the office to contact you? OK to leave message on   voicemail  Preferred Call Back Phone Number? [phone]  ---------------------------------------------------------------------------  --------------  SCRIPT ANSWERS  Relationship to Patient? Self  Appointment reason? Establish Care/Find a provider  Have you been diagnosed with   tested for   or told that you are suspected of having COVID-19 (Coronavirus)? No  Have you had a fever or taken medication to treat a fever within the past   3 days? No  Have you had a cough   shortness of breath or flu-like symptoms within the past 3 days? No  Do you currently have flu-like symptoms including fever or chills   cough   shortness of breath   or difficulty breathing   or new loss of taste or smell? No  (Service Expert  click yes below to proceed with [employer] As Usual   Scheduling)?  Yes

## 2020-09-24 ENCOUNTER — TELEPHONE (OUTPATIENT)
Dept: FAMILY MEDICINE CLINIC | Age: 75
End: 2020-09-24

## 2020-09-24 ENCOUNTER — NURSE TRIAGE (OUTPATIENT)
Dept: OTHER | Facility: CLINIC | Age: 75
End: 2020-09-24

## 2020-09-24 NOTE — TELEPHONE ENCOUNTER
Reason for Disposition   Abdominal pain is a chronic symptom (recurrent or ongoing AND lasting > 4 weeks)    Answer Assessment - Initial Assessment Questions  1. LOCATION: \"Where does it hurt? \"       Lower abd, bladder area she states  2. RADIATION: \"Does the pain shoot anywhere else? \" (e.g., chest, back)   no  3. ONSET: \"When did the pain begin? \" (e.g., minutes, hours or days ago)   1/2020 she states, saw urologist in [place], states no better, saw urologist 8/2020 recommended botox but she wants to talk to [facility]  4. SUDDEN: \"Gradual or sudden onset? \"   gradual  5. PATTERN \"Does the pain come and go, or is it constant? \"     - If constant: \"Is it getting better, staying the same, or worsening? \"       (Note: Constant means the pain never goes away completely; most serious pain is constant and it progresses)      - If intermittent: \"How long does it last?\" \"Do you have pain now? \"      (Note: Intermittent means the pain goes away completely between bouts)    constant  6. SEVERITY: \"How bad is the pain? \"  (e.g., Scale 1-10; mild, moderate, or severe)    - MILD (1-3): doesn't interfere with normal activities, abdomen soft and not tender to touch     - MODERATE (4-7): interferes with normal activities or awakens from sleep, tender to touch     - SEVERE (8-10): excruciating pain, doubled over, unable to do any normal activities     mild  7. RECURRENT SYMPTOM: \"Have you ever had this type of abdominal pain before? \" If so, ask: \"When was the last time? \" and \"What happened that time? \"   Yes ongoing she states a urology issue, unable to assess  8. CAUSE: \"What do you think is causing the abdominal pain? \"  bladder  9. RELIEVING/AGGRAVATING FACTORS: \"What makes it better or worse? \" (e.g., movement, antacids, bowel movement)  Sitting makes it worse  10. OTHER SYMPTOMS: \"Has there been any vomiting, diarrhea, constipation, or urine problems? \"      no    Protocols used: ABDOMINAL PAIN [facility]  Attention Provider:   Thank you for allowing me to participate in the care of your patient. The patient was connected to triage in response to information provided to the [facility]. Please do not respond through this encounter as the response is not directed to a shared pool.      Call transferred to Elvia Ríos

## 2020-09-24 NOTE — TELEPHONE ENCOUNTER
----- Message from Elvia You sent at 9/24/2020 11:28 AM EDT -----  Subject: Appointment Request    Reason for Call: Routine Return from RN Triage    QUESTIONS  Type of Appointment? Established Patient  Reason for appointment request? No appointments available during search  Additional Information for Provider? Patient was transferred from nurse   triage needs to be seen within 2wks for ongoing bladder issues. Requesting   to see Dr. Selin Conley seeing that Dr. Rosa Cai is retiring. Please assist  ---------------------------------------------------------------------------  --------------  CALL BACK INFO  What is the best way for the office to contact you? OK to leave message on   voicemail  Preferred Call Back Phone Number? [phone]  ---------------------------------------------------------------------------  --------------  SCRIPT ANSWERS  Patient can be seen for a routine visit? Yes  Nurse Name? Jacob Haque  (Did RN indicate the need for Red Scheduling?)? No  Have you been diagnosed with COVID-19 (Coronavirus)   tested for COVID-19 (Coronavirus)   or told that you are suspected of having COVID-19 (Coronavirus)? No  Have you had a fever or taken medication to treat a fever within the past   3 days? No  Have you had a cough   shortness of breath or flu-like symptoms within the past 3 days? No  Do you currently have flu-like symptoms including fever or chills   cough   shortness of breath   or difficulty breathing   or new loss of taste or smell? No  (Service Expert  click yes below to proceed with [employer] As Usual   Scheduling)?  Yes

## 2020-09-24 NOTE — TELEPHONE ENCOUNTER
Reason for Disposition   MODERATE OR MILD pain that comes and goes (cramps) lasts > 24 hours    Answer Assessment - Initial Assessment Questions  1. LOCATION: \"Where does it hurt? \"       Lower abdomen  2. RADIATION: \"Does the pain shoot anywhere else? \" (e.g., chest, back)      No radiation  3. ONSET: \"When did the pain begin? \" (e.g., minutes, hours or days ago)       Months ago  4. SUDDEN: \"Gradual or sudden onset? \"      gradual  5. PATTERN \"Does the pain come and go, or is it constant? \"     - If constant: \"Is it getting better, staying the same, or worsening? \"       (Note: Constant means the pain never goes away completely; most serious pain is constant and it progresses)      - If intermittent: \"How long does it last?\" \"Do you have pain now? \"      (Note: Intermittent means the pain goes away completely between bouts)      Comes and goes  6. SEVERITY: \"How bad is the pain? \"  (e.g., Scale 1-10; mild, moderate, or severe)    - MILD (1-3): doesn't interfere with normal activities, abdomen soft and not tender to touch     - MODERATE (4-7): interferes with normal activities or awakens from sleep, tender to touch     - SEVERE (8-10): excruciating pain, doubled over, unable to do any normal activities      moderate  7. RECURRENT SYMPTOM: \"Have you ever had this type of abdominal pain before? \" If so, ask: \"When was the last time? \" and \"What happened that time? \"       Yes, ongoing for months, patient has had multiple appointments and testing  8. CAUSE: \"What do you think is causing the abdominal pain? \"      unknown  9. RELIEVING/AGGRAVATING FACTORS: \"What makes it better or worse? \" (e.g., movement, antacids, bowel movement)      Ibuprofen helps, sitting down worsens  10. OTHER SYMPTOMS: \"Has there been any vomiting, diarrhea, constipation, or urine problems? \"        Urinary frequency  11. PREGNANCY: \"Is there any chance you are pregnant? \" \"When was your last menstrual period? \"        n/a    Protocols used: ABDOMINAL PAIN - FEMALE-ADULT-[place]

## 2020-10-14 ENCOUNTER — OFFICE VISIT (OUTPATIENT)
Dept: FAMILY MEDICINE CLINIC | Age: 75
End: 2020-10-14
Payer: MEDICARE

## 2020-10-14 VITALS
WEIGHT: 133 LBS | HEART RATE: 127 BPM | OXYGEN SATURATION: 97 % | DIASTOLIC BLOOD PRESSURE: 80 MMHG | HEIGHT: 60 IN | SYSTOLIC BLOOD PRESSURE: 130 MMHG | BODY MASS INDEX: 26.11 KG/M2 | TEMPERATURE: 97.3 F

## 2020-10-14 PROBLEM — R10.2 SUPRAPUBIC ABDOMINAL PAIN: Status: ACTIVE | Noted: 2020-10-14

## 2020-10-14 PROCEDURE — 99213 OFFICE O/P EST LOW 20 MIN: CPT | Performed by: FAMILY MEDICINE

## 2020-10-14 PROCEDURE — 81000 URINALYSIS NONAUTO W/SCOPE: CPT | Performed by: FAMILY MEDICINE

## 2020-10-14 ASSESSMENT — ENCOUNTER SYMPTOMS
FLATUS: 0
DIARRHEA: 0
HEMATOCHEZIA: 0
BELCHING: 0
CONSTIPATION: 0
VOMITING: 0
NAUSEA: 0
ABDOMINAL PAIN: 1

## 2020-10-14 ASSESSMENT — CROHNS DISEASE ACTIVITY INDEX (CDAI): CDAI SCORE: 0

## 2020-10-14 ASSESSMENT — PATIENT HEALTH QUESTIONNAIRE - PHQ9
SUM OF ALL RESPONSES TO PHQ9 QUESTIONS 1 & 2: 0
SUM OF ALL RESPONSES TO PHQ QUESTIONS 1-9: 0
2. FEELING DOWN, DEPRESSED OR HOPELESS: 0
1. LITTLE INTEREST OR PLEASURE IN DOING THINGS: 0
SUM OF ALL RESPONSES TO PHQ QUESTIONS 1-9: 0
SUM OF ALL RESPONSES TO PHQ QUESTIONS 1-9: 0

## 2020-10-14 NOTE — PROGRESS NOTES
Subjective:     Patient Lisa Mac is a 76 y.o. female. Abdominal Pain   This is a recurrent problem. The current episode started more than 1 month ago. The onset quality is gradual. The problem has been waxing and waning. The pain is located in the suprapubic region. The pain is mild. The quality of the pain is aching. The abdominal pain does not radiate. Pertinent negatives include no anorexia, arthralgias, belching, constipation, diarrhea, dysuria, fever, flatus, frequency, headaches, hematochezia, hematuria, melena, myalgias, nausea, vomiting or weight loss. Nothing (worse at night) aggravates the pain. Relieved by: NB with meds for \"overactive bladder\" Treatments tried: see abpve. Prior diagnostic workup includes ultrasound and surgery (cysto--neg--abd/pelvic u/s-neg). There is no history of abdominal surgery, colon cancer, Crohn's disease, gallstones, GERD, irritable bowel syndrome, pancreatitis, PUD or ulcerative colitis. No Known Allergies    Current Outpatient Medications   Medication Sig Dispense Refill    atorvastatin (LIPITOR) 20 MG tablet TAKE ONE TABLET BY MOUTH DAILY 90 tablet 0    Calcium Carbonate-Vitamin D (CALCIUM + D) 600-200 MG-UNIT TABS Take  by mouth 2 times daily.  oxybutynin (DITROPAN) 5 MG tablet Take 5 mg by mouth nightly       No current facility-administered medications for this visit.         Past Medical History:   Diagnosis Date    Encounter for cervical Pap smear with pelvic exam *Oct. 11,2019    Negative     Hyperlipidemia     Osteopenia DEXA-July, 2014    Lumbar T score -1.7 and Hip T score -1.9    Osteopenia DEXA - Dec. 31, 2018    Lumbar T score - 1.6 and Hip T score - 1.8     Osteoporosis DEXA - Aug. 2008    Lumbar T score -3.0 and fem neck -1.9    Osteoporosis DEXA - Sept., 2011    Lumbar T score -2.5 and frm neck -1.8    Other screening mammogram November 24, 2012    Negative    Other screening mammogram December 4, 2013    Negative    Other screening mammogram December 8, 2014    Negative    Other screening mammogram January 6, 2016    Negative    Screening mammogram for high-risk patient November 10, 2011    Negative    Screening mammogram, encounter for *March 8, 2017    Negative    Screening mammogram, encounter for *March 14, 2018    Negative    Screening mammogram, encounter for *March 20, 2019    Negative    Screening mammogram, encounter for *Leidy 15, 2020    Negative    Vitamin D deficiency     Wrist fracture, left Mar., 2016    Left       Past Surgical History:   Procedure Laterality Date    COLONOSCOPY  Dec., 2002 ( 2012 )    Dr. Orlando Burks - normal.    COLONOSCOPY  Oct., 2013 ( 2023 )    Davis Jane - Normal    CYSTOSCOPY  *May 14, 2020    Dr. Irma Hoang - negative       Social History     Socioeconomic History    Marital status:      Spouse name: Thee Raines Number of children: 3    Years of education: Not on file    Highest education level: Not on file   Occupational History    Occupation: part time [employer] resource strain: Not on file    Food insecurity     Worry: Not on file     Inability: Not on file   [employer] needs     Medical: Not on file     Non-medical: Not on file   Tobacco Use    Smoking status: Never Smoker    Smokeless tobacco: Never Used   Substance and Sexual Activity    Alcohol use: Yes     Alcohol/week: 0.0 standard drinks     Comment: occasional wine.     Drug use: Not on file    Sexual activity: Not on file   Lifestyle    Physical activity     Days per week: Not on file     Minutes per session: Not on file    Stress: Not on file   Relationships    Social connections     Talks on phone: Not on file     Gets together: Not on file     Attends Yazidi service: Not on file     Active member of club or organization: Not on file     Attends meetings of clubs or organizations: Not on file     Relationship status: Not on file    Intimate partner violence     Fear of current or ex partner: Not on file     Emotionally abused: Not on file     Physically abused: Not on file     Forced sexual activity: Not on file   Other Topics Concern    Not on file   Social History Narrative    Living Will:  No.    Happily  and 3 kids here    [de-identified] and bakes,gardens--PT seemstress           Family History   Problem Relation Age of Onset    Hypertension Father 80        , hypertension, facial tumor.  Cancer Mother 80        , carcinoma of the lung. Immunization History   Administered Date(s) Administered    Influenza, High Dose (Fluzone 65 yrs and older) 10/15/2017, 10/11/2018    Pneumococcal Conjugate 13-valent (Beata Salma) 2016    Pneumococcal Polysaccharide ([id number]) 2011, 10/15/2017    Td, unspecified formulation 04/15/2008       Review of Systems  Review of Systems   Constitutional: Negative for fever and weight loss. Gastrointestinal: Positive for abdominal pain. Negative for anorexia, constipation, diarrhea, flatus, hematochezia, melena, nausea and vomiting. Genitourinary: Negative for dysuria, frequency and hematuria. Musculoskeletal: Negative for arthralgias and myalgias. Neurological: Negative for headaches. Objective:   Physical Exam  Physical Exam  Vitals signs reviewed. Constitutional:       General: She is not in acute distress. Appearance: She is well-developed. Eyes:      Conjunctiva/sclera: Conjunctivae normal.      Pupils: Pupils are equal, round, and reactive to light. Neck:      Thyroid: No thyromegaly. Vascular: No JVD. Trachea: No tracheal deviation. Cardiovascular:      Rate and Rhythm: Normal rate and regular rhythm. Heart sounds: Normal heart sounds. No murmur. No gallop. Pulmonary:      Effort: Pulmonary effort is normal. No respiratory distress. Breath sounds: Normal breath sounds. No stridor. No wheezing or rales. Chest:      Chest wall: No tenderness.    Abdominal: General: Bowel sounds are normal. There is no distension. Palpations: Abdomen is soft. There is no mass. Tenderness: There is no abdominal tenderness. There is no right CVA tenderness, left CVA tenderness, guarding or rebound. Hernia: No hernia is present. Musculoskeletal:         General: No tenderness. Lymphadenopathy:      Cervical: No cervical adenopathy. Skin:     General: Skin is warm and dry. Coloration: Skin is not pale. Findings: No erythema or rash. Neurological:      Mental Status: She is alert and oriented to person, place, and time. Cranial Nerves: No cranial nerve deficit. Motor: No abnormal muscle tone. Coordination: Coordination normal.      Deep Tendon Reflexes: Reflexes normal.   Psychiatric:         Behavior: Behavior normal.         Thought Content: Thought content normal.         Judgment: Judgment normal.       Urine dipstick shows positive for RBC's.   Micro exam: 1 WBC's per HPF--bacteria    Assessment and Plan:     Suprapubic abdominal pain  Maybe related to overactive bladder but NB with meds--will get colonoscopy and cx urine--then consider further tx

## 2020-10-14 NOTE — ASSESSMENT & PLAN NOTE
Maybe related to overactive bladder but NB with meds--will get colonoscopy and cx urine--then consider further tx

## 2020-10-15 LAB — URINE CULTURE, ROUTINE: NORMAL

## 2020-10-20 ENCOUNTER — NURSE ONLY (OUTPATIENT)
Dept: FAMILY MEDICINE CLINIC | Age: 75
End: 2020-10-20
Payer: MEDICARE

## 2020-10-20 PROCEDURE — G0008 ADMIN INFLUENZA VIRUS VAC: HCPCS | Performed by: FAMILY MEDICINE

## 2020-10-20 PROCEDURE — 90694 VACC AIIV4 NO PRSRV 0.5ML IM: CPT | Performed by: FAMILY MEDICINE

## 2020-10-20 NOTE — PROGRESS NOTES
Vaccine Information Sheet, \"Influenza - Inactivated\"  given to Yolanda Paget, or parent/legal guardian of  Yolanda Paget and verbalized understanding. Patient responses:    Have you ever had a reaction to a flu vaccine? No  Do you have any current illness? No  Have you ever had Guillian [place] Syndrome? No  Do you have a serious allergy to any of the follow: Neomycin, Polymyxin, Thimerosal, eggs or egg products? No    Flu vaccine given per order. Please see immunization tab. Risks and benefits explained. Current VIS given.

## 2020-11-24 ENCOUNTER — OFFICE VISIT (OUTPATIENT)
Dept: FAMILY MEDICINE CLINIC | Age: 75
End: 2020-11-24
Payer: MEDICARE

## 2020-11-24 VITALS
WEIGHT: 133 LBS | SYSTOLIC BLOOD PRESSURE: 120 MMHG | BODY MASS INDEX: 26.11 KG/M2 | HEART RATE: 110 BPM | HEIGHT: 60 IN | DIASTOLIC BLOOD PRESSURE: 70 MMHG | OXYGEN SATURATION: 98 % | TEMPERATURE: 97.7 F

## 2020-11-24 DIAGNOSIS — Z00.00 WELL ADULT EXAM: ICD-10-CM

## 2020-11-24 DIAGNOSIS — E55.9 VITAMIN D DEFICIENCY: ICD-10-CM

## 2020-11-24 PROBLEM — D12.5 ADENOMATOUS POLYP OF SIGMOID COLON: Status: ACTIVE | Noted: 2020-11-24

## 2020-11-24 LAB
A/G RATIO: 2.5 (ref 1.1–2.2)
ALBUMIN SERPL-MCNC: 4.5 G/DL (ref 3.4–5)
ALP BLD-CCNC: 77 U/L (ref 40–129)
ALT SERPL-CCNC: 17 U/L (ref 10–40)
ANION GAP SERPL CALCULATED.3IONS-SCNC: 11 MMOL/L (ref 3–16)
AST SERPL-CCNC: 18 U/L (ref 15–37)
BASOPHILS ABSOLUTE: 0 K/UL (ref 0–0.2)
BASOPHILS RELATIVE PERCENT: 0.8 %
BILIRUB SERPL-MCNC: 0.3 MG/DL (ref 0–1)
BUN BLDV-MCNC: 18 MG/DL (ref 7–20)
CALCIUM SERPL-MCNC: 9.6 MG/DL (ref 8.3–10.6)
CHLORIDE BLD-SCNC: 104 MMOL/L (ref 99–110)
CHOLESTEROL, TOTAL: 198 MG/DL (ref 0–199)
CO2: 27 MMOL/L (ref 21–32)
CREAT SERPL-MCNC: 0.6 MG/DL (ref 0.6–1.2)
EOSINOPHILS ABSOLUTE: 0.1 K/UL (ref 0–0.6)
EOSINOPHILS RELATIVE PERCENT: 1.5 %
GFR AFRICAN AMERICAN: >60
GFR NON-AFRICAN AMERICAN: >60
GLOBULIN: 1.8 G/DL
GLUCOSE BLD-MCNC: 115 MG/DL (ref 70–99)
HCT VFR BLD CALC: 38.8 % (ref 36–48)
HDLC SERPL-MCNC: 72 MG/DL (ref 40–60)
HEMOGLOBIN: 12.7 G/DL (ref 12–16)
LDL CHOLESTEROL CALCULATED: 99 MG/DL
LYMPHOCYTES ABSOLUTE: 1.6 K/UL (ref 1–5.1)
LYMPHOCYTES RELATIVE PERCENT: 35.9 %
MCH RBC QN AUTO: 28.7 PG (ref 26–34)
MCHC RBC AUTO-ENTMCNC: 32.8 G/DL (ref 31–36)
MCV RBC AUTO: 87.7 FL (ref 80–100)
MONOCYTES ABSOLUTE: 0.5 K/UL (ref 0–1.3)
MONOCYTES RELATIVE PERCENT: 11.1 %
NEUTROPHILS ABSOLUTE: 2.3 K/UL (ref 1.7–7.7)
NEUTROPHILS RELATIVE PERCENT: 50.7 %
PDW BLD-RTO: 14.2 % (ref 12.4–15.4)
PLATELET # BLD: 208 K/UL (ref 135–450)
PMV BLD AUTO: 8.6 FL (ref 5–10.5)
POTASSIUM SERPL-SCNC: 5.1 MMOL/L (ref 3.5–5.1)
RBC # BLD: 4.43 M/UL (ref 4–5.2)
SODIUM BLD-SCNC: 142 MMOL/L (ref 136–145)
TOTAL PROTEIN: 6.3 G/DL (ref 6.4–8.2)
TRIGL SERPL-MCNC: 137 MG/DL (ref 0–150)
VITAMIN D 25-HYDROXY: 53.7 NG/ML
VLDLC SERPL CALC-MCNC: 27 MG/DL
WBC # BLD: 4.5 K/UL (ref 4–11)

## 2020-11-24 PROCEDURE — G0439 PPPS, SUBSEQ VISIT: HCPCS | Performed by: FAMILY MEDICINE

## 2020-11-24 RX ORDER — KETOCONAZOLE 20 MG/G
CREAM TOPICAL
Qty: 30 G | Refills: 1 | Status: SHIPPED | OUTPATIENT
Start: 2020-11-24 | End: 2021-05-26 | Stop reason: ALTCHOICE

## 2020-11-24 ASSESSMENT — LIFESTYLE VARIABLES
HOW OFTEN DURING THE LAST YEAR HAVE YOU HAD A FEELING OF GUILT OR REMORSE AFTER DRINKING: 0
HOW MANY STANDARD DRINKS CONTAINING ALCOHOL DO YOU HAVE ON A TYPICAL DAY: 0
HOW OFTEN DURING THE LAST YEAR HAVE YOU NEEDED AN ALCOHOLIC DRINK FIRST THING IN THE MORNING TO GET YOURSELF GOING AFTER A NIGHT OF HEAVY DRINKING: 0
HAS A RELATIVE, FRIEND, DOCTOR, OR ANOTHER HEALTH PROFESSIONAL EXPRESSED CONCERN ABOUT YOUR DRINKING OR SUGGESTED YOU CUT DOWN: 0
HOW OFTEN DO YOU HAVE SIX OR MORE DRINKS ON ONE OCCASION: 0
AUDIT-C TOTAL SCORE: 2
HAVE YOU OR SOMEONE ELSE BEEN INJURED AS A RESULT OF YOUR DRINKING: 0
AUDIT TOTAL SCORE: 2
HOW OFTEN DO YOU HAVE A DRINK CONTAINING ALCOHOL: 2
HOW OFTEN DURING THE LAST YEAR HAVE YOU FAILED TO DO WHAT WAS NORMALLY EXPECTED FROM YOU BECAUSE OF DRINKING: 0
HOW OFTEN DURING THE LAST YEAR HAVE YOU BEEN UNABLE TO REMEMBER WHAT HAPPENED THE NIGHT BEFORE BECAUSE YOU HAD BEEN DRINKING: 0
HOW OFTEN DURING THE LAST YEAR HAVE YOU FOUND THAT YOU WERE NOT ABLE TO STOP DRINKING ONCE YOU HAD STARTED: 0

## 2020-11-24 ASSESSMENT — PATIENT HEALTH QUESTIONNAIRE - PHQ9
SUM OF ALL RESPONSES TO PHQ QUESTIONS 1-9: 0
1. LITTLE INTEREST OR PLEASURE IN DOING THINGS: 0
SUM OF ALL RESPONSES TO PHQ9 QUESTIONS 1 & 2: 0
SUM OF ALL RESPONSES TO PHQ QUESTIONS 1-9: 0
SUM OF ALL RESPONSES TO PHQ QUESTIONS 1-9: 0
2. FEELING DOWN, DEPRESSED OR HOPELESS: 0

## 2020-11-24 NOTE — PROGRESS NOTES
Medicare Annual Wellness Visit  Name: Franko Paniagua Date: 2020   MRN: <[id number]> Sex: Female   Age: 76 y.o. Ethnicity: Non-/Non    : 1945 Race: Danette Benoit is here for Medicare AWV    Screenings for behavioral, psychosocial and functional/safety risks, and cognitive dysfunction are all negative except as indicated below. These results, as well as other patient data from the [address] form, are documented in Flowsheets linked to this Encounter. No Known Allergies    Prior to Visit Medications    Medication Sig Taking? Authorizing Provider   atorvastatin (LIPITOR) 20 MG tablet TAKE ONE TABLET BY MOUTH DAILY Yes V Kelly Benton MD   Calcium Carbonate-Vitamin D (CALCIUM + D) 600-200 MG-UNIT TABS Take  by mouth 2 times daily. Yes Historical Provider, MD   oxybutynin (DITROPAN) 5 MG tablet Take 5 mg by mouth nightly  Historical Provider, MD       Past Medical History:   Diagnosis Date    Encounter for cervical Pap smear with pelvic exam *Oct. 11,2019    Negative     Hyperlipidemia     Osteoporosis DEXA - 2011    Lumbar T score -2.5 and frm neck -1.8    Screening mammogram for high-risk patient November 10, 2011    Negative    Screening mammogram, encounter for *2017    Negative    Screening mammogram, encounter for *2018    Negative    Screening mammogram, encounter for *2019    Negative    Screening mammogram, encounter for *Leidy 15, 2020    Negative    Vitamin D deficiency     Wrist fracture, left Mar., 2016    Left       Past Surgical History:   Procedure Laterality Date    COLONOSCOPY  Dec., 2002 (  )    Dr. Vignesh Hammer - normal.    COLONOSCOPY  Oct., 2013 (  )    Yisel Muhammad - Normal    CYSTOSCOPY  *May 14, 2020    Dr. Deepti Burrell - negative       Family History   Problem Relation Age of Onset    Hypertension Father 80        , hypertension, facial tumor.     Cancer Mother 80        , carcinoma of the lung.  Cancer Brother         prostate    Breast Cancer Daughter 39       CareTeam (Including outside providers/suppliers regularly involved in providing care):   Patient Care Team:  Anna Paez MD as PCP - General (Family Medicine)  Anna Paez [facility]  Liz Rowell MD as Consulting Physician (Obstetrics & Gynecology)  Haily Mendez MD as Consulting Physician (Otolaryngology)  Anna Paez MD as Consulting Physician (Family Medicine)    Wt Readings from Last 3 Encounters:   11/24/20 133 lb (60.3 kg)   10/14/20 133 lb (60.3 kg)   11/20/19 132 lb (59.9 kg)     Vitals:    11/24/20 1010   BP: 120/70   Pulse: 110   Temp: 97.7 °F (36.5 °C)   SpO2: 98%   Weight: 133 lb (60.3 kg)   Height: 5' (1.524 m)     Body mass index is 25.97 kg/m². Based upon direct observation of the patient, evaluation of cognition reveals recent and remote memory intact.     General Appearance: alert and oriented to person, place and time, well developed and well- nourished, in no acute distress  Skin: warm and dry, no rash or erythema  Head: normocephalic and atraumatic  Eyes: pupils equal, round, and reactive to light, extraocular eye movements intact, conjunctivae normal  ENT: tympanic membrane, external ear and ear canal normal bilaterally, nose without deformity, nasal mucosa and turbinates normal without polyps  Neck: supple and non-tender without mass, no thyromegaly or thyroid nodules, no cervical lymphadenopathy  Pulmonary/Chest: clear to auscultation bilaterally- no wheezes, rales or rhonchi, normal air movement, no respiratory distress  Cardiovascular: normal rate, regular rhythm, normal S1 and S2, no murmurs, rubs, clicks, or gallops, distal pulses intact, no carotid bruits  Abdomen: soft, non-tender, non-distended, normal bowel sounds, no masses or organomegaly  Extremities: no cyanosis, clubbing or edema  Musculoskeletal: normal range of motion, no joint swelling, deformity or tenderness  Neurologic: reflexes normal and symmetric, no cranial nerve deficit, gait, coordination and speech normal    Patient's complete Health Risk Assessment and screening values have been reviewed and are found in Flowsheets. The following problems were reviewed today and where indicated follow up appointments were made and/or referrals ordered. Positive Risk Factor Screenings with Interventions:     General Health and ACP:  General  In general, how would you say your health is?: Good  In the past 7 days, have you experienced any of the following?  New or Increased Pain, New or Increased Fatigue, Loneliness, Social Isolation, Stress or Anger?: None of These  Do you get the social and emotional support that you need?: Yes  Do you have a Living Will?: Yes  Advance Directives     Power of  Living Will ACP-Advance Directive ACP-Power of     Not on File Not on File [address] [facility] Risk Interventions:  · none    Hearing/Vision:  No exam data present  Hearing/Vision  Do you or your family notice any trouble with your hearing?: No  Do you have difficulty driving, watching TV, or doing any of your daily activities because of your eyesight?: No  Have you had an eye exam within the past year?: (!) No  Hearing/Vision Interventions:  · none    Personalized Preventive Plan   Current Health Maintenance Status  Immunization History   Administered Date(s) Administered    Influenza, High Dose (Fluzone 65 yrs and older) 10/15/2017, 10/11/2018    Influenza, Quadv, adjuvanted, 65 yrs +, IM, PF (Fluad) 10/20/2020    Pneumococcal Conjugate 13-valent (Braulio Balm) 08/07/2016    Pneumococcal Polysaccharide ([id number]) 02/23/2011, 10/15/2017    Td, unspecified formulation 04/15/2008        Health Maintenance   Topic Date Due    Annual Wellness Visit (AWV)  05/29/2019    DTaP/Tdap/Td vaccine (1 - Tdap) 12/31/2099 (Originally 8/12/1964)    Shingles Vaccine (1 of 2) 12/31/2099 (Originally 8/12/1995)    Lipid screen  01/03/2021    Colon cancer screen colonoscopy  10/22/2030    DEXA (modify frequency per FRAX score)  Completed    Flu vaccine  Completed    Pneumococcal 65+ years Vaccine  Completed    Hepatitis C screen  Completed    Hepatitis A vaccine  Aged Out    Hepatitis B vaccine  Aged Out    Hib vaccine  Aged Out    Meningococcal (ACWY) vaccine  Aged Out     Recommendations for [employer] Due: see orders and patient instructions/AVS.  . Recommended screening schedule for the next 5-10 years is provided to the patient in written form: see Patient Yanna Gentile was seen today for medicare awv. Diagnoses and all orders for this visit:  Adenomatous polyp of sigmoid colon  ?  Cause of suprapubic pain    Hyperlipidemia  Labs--TOS/SE discussed    Suprapubic abdominal pain  See above

## 2020-11-25 RX ORDER — ATORVASTATIN CALCIUM 20 MG/1
TABLET, FILM COATED ORAL
Qty: 90 TABLET | Refills: 3 | Status: SHIPPED | OUTPATIENT
Start: 2020-11-25 | End: 2021-12-25

## 2020-12-08 ENCOUNTER — TELEPHONE (OUTPATIENT)
Dept: FAMILY MEDICINE CLINIC | Age: 75
End: 2020-12-08

## 2020-12-08 NOTE — TELEPHONE ENCOUNTER
For skin irritation used the ketoconazole cream you gave her   When she walks a lot it starts to bleed (outside skin around her rectum)  Saw gyn who gave ciatrimazole her didn't help either. Any other suggestions or see someone else?

## 2020-12-14 ENCOUNTER — TELEPHONE (OUTPATIENT)
Dept: SURGERY | Age: 75
End: 2020-12-14

## 2020-12-14 NOTE — TELEPHONE ENCOUNTER
Patient advised that feels like she has an infection and would like to know if she can come in sooner than her 12/23/20 appt    Please contact the patient at [phone]

## 2020-12-15 ENCOUNTER — OFFICE VISIT (OUTPATIENT)
Dept: SURGERY | Age: 75
End: 2020-12-15
Payer: MEDICARE

## 2020-12-15 VITALS
DIASTOLIC BLOOD PRESSURE: 82 MMHG | HEIGHT: 60 IN | HEART RATE: 90 BPM | WEIGHT: 133 LBS | TEMPERATURE: 96.4 F | BODY MASS INDEX: 26.11 KG/M2 | SYSTOLIC BLOOD PRESSURE: 127 MMHG

## 2020-12-15 PROCEDURE — 99203 OFFICE O/P NEW LOW 30 MIN: CPT | Performed by: SURGERY

## 2020-12-15 PROCEDURE — 46600 DIAGNOSTIC ANOSCOPY SPX: CPT | Performed by: SURGERY

## 2020-12-15 NOTE — PROGRESS NOTES
[address] [address] [address] [facility]  Dept: [phone]  Dept Fax: [phone]  Loc: [phone]    Visit Date: 12/15/2020    Radha Jaffe is a 76 y.o. female who presents today for: New Patient  Anal irritation    HPI:       Radha Jaffe is a 76 y.o. female referred to me by Dr. Divina Pradhan, her primary care physician, for further evaluation regarding anal irritation. Alejandrina Gonzalez tells me she has had about 3 to 4 months of increasing anal irritation that bothers her multiple times per day. She has tried multiple ointments, mostly steroid-based with no improvement. She takes 3 showers per day to try to help. She had a recent colonoscopy revealing 1 adenomatous polyp and was recommended to have repeat colonoscopy in 3 years, with Dr. Sherman Samuels. She denies fever, chills, nausea, vomiting, food intolerance, weight loss, bowel pain, rectal bleeding. Denies incontinence. Patient's problem list, medications, past medical, surgical, family, and social histories were reviewed and updated in the chart as indicated today.     Past Medical History:   Diagnosis Date    Encounter for cervical Pap smear with pelvic exam *Oct. 11,2019    Negative     Hyperlipidemia     Osteoporosis DEXA - Sept., 2011    Lumbar T score -2.5 and frm neck -1.8    Screening mammogram for high-risk patient November 10, 2011    Negative    Screening mammogram, encounter for *March 8, 2017    Negative    Screening mammogram, encounter for *March 14, 2018    Negative    Screening mammogram, encounter for *March 20, 2019    Negative    Screening mammogram, encounter for *Leidy 15, 2020    Negative    Vitamin D deficiency     Wrist fracture, left Mar., 2016    Left       Past Surgical History:   Procedure Laterality Date    COLONOSCOPY  Dec., 2002 ( 2012 )    Dr. Miley Orozco - normal.    COLONOSCOPY  Oct., 2013 ( 2023 )    Lazarus Laming - Normal    CYSTOSCOPY  *May 14, 2020    Dr. Jabier Jimenesus - negative       Cancer-related family history includes Breast Cancer (age of onset: 39) in her daughter; Cancer in her brother; Cancer (age of onset: 80) in her mother. Social History:   Social History     Tobacco Use    Smoking status: Never Smoker    Smokeless tobacco: Never Used   Substance Use Topics    Alcohol use: Yes     Alcohol/week: 0.0 standard drinks     Comment: occasional wine. Tobacco cessation counseling provided as appropriate. REVIEW OF SYSTEMS:    Pertinent positives and negatives are mentioned in the HPI above. Otherwise, all other systems were reviewed and negative. Objective:     Physical Exam   Temp 96.4 °F (35.8 °C) (Temporal)   Wt 133 lb (60.3 kg)   BMI 25.97 kg/m²   Constitutional: Appears well-developed and well-nourished. Grooming appropriate. No gross deformities. Body mass index is 25.97 kg/m². Eyes: No scleral icterus. Conjunctiva/lids normal. Vision intact grossly. Pupils equal/symmetric, reactive bilaterally. ENT: External ears/nose without defect, scars, or masses. Hearing grossly intact. No facial deformity. Lips normal, normal dentition. Neck: No masses. Trachea midline. No crepitus. Thyroid not enlarged. Cardiovascular: Normal rate. No peripheral edema. Abdominal aorta normal size to palpation. Pulmonary/Chest: Effort normal. No respiratory distress. No wheezes. No use of accessory muscles. Musculoskeletal: Normal range of motion x all 4 extremities and head/neck, without deformity, pain, or crepitus, with normal strength and tone. Normal gait. Nails without clubbing or cyanosis. Neurological: Alert and oriented to person, place, and time. No gross deficits. Sensation intact. Skin: Skin is dry. No rashes noted. No pallor. No induration of nodules. Psychiatric: Normal mood and affect. Behavior normal. Oriented to person, place, and time. Judgment and insight reasonable.     Abdominal/wound: soft, nontender    During my initial bacterial, AIN, etc., but these are fairly unlikely in most circumstances. Agree with 3-year follow-up colonoscopy for precancerous polyp greater than 1 cm noted on recent colonoscopy with Dr. Sam Walker. I provided written information in the After Visit Summary AVS Regarding: fiber    DISPOSITION:  F/u PRN    My findings will be relayed to consulting practitioner or PCP via Epic    Total encounter time:  35 min with > 50% spent with face-to-face counseling and coordination of care, including: Discussion, counseling, coordination care as above    Note completed using dictation software, please excuse any errors.     Electronically signed by Cassandra Benz MD on 12/15/2020 at 9:06 AM

## 2020-12-18 ENCOUNTER — TELEPHONE (OUTPATIENT)
Dept: FAMILY MEDICINE CLINIC | Age: 75
End: 2020-12-18

## 2020-12-18 NOTE — TELEPHONE ENCOUNTER
Discussed with Dr. Nadir Moura. Called and advised to patient to isolate and call us on Monday - it's too soon to do any testing. She already called the covid clinic and is scheduled for testing Monday at 8:45.

## 2020-12-18 NOTE — TELEPHONE ENCOUNTER
Someone came to her house - never came in the house. She was outside to pick something up. The lady stepped back said she had been exposed to covid. She called today and told Ines Purvis she was positive. Both had masks on. The encounter didn't last long outside - just handed her something. Ines Purvis is worried about her exposure and if she should be tested.

## 2020-12-21 ENCOUNTER — OFFICE VISIT (OUTPATIENT)
Dept: PRIMARY CARE CLINIC | Age: 75
End: 2020-12-21
Payer: MEDICARE

## 2020-12-21 PROCEDURE — 99211 OFF/OP EST MAY X REQ PHY/QHP: CPT | Performed by: NURSE PRACTITIONER

## 2020-12-21 NOTE — PROGRESS NOTES
Mike Obregon received a viral test for COVID-19. They were educated on isolation and quarantine as appropriate. For any symptoms, they were directed to seek care from their PCP, given contact information to establish with a doctor, directed to an urgent care or the emergency room.

## 2020-12-23 LAB — SARS-COV-2, NAA: NOT DETECTED

## 2021-03-24 ENCOUNTER — HOSPITAL ENCOUNTER (OUTPATIENT)
Dept: GENERAL RADIOLOGY | Age: 76
Discharge: HOME | End: 2021-03-24
Payer: MEDICARE

## 2021-03-24 DIAGNOSIS — Z78.0 MENOPAUSE: ICD-10-CM

## 2021-03-24 PROCEDURE — 77080 DXA BONE DENSITY AXIAL: CPT

## 2021-03-25 ENCOUNTER — TELEPHONE (OUTPATIENT)
Dept: FAMILY MEDICINE CLINIC | Age: 76
End: 2021-03-25

## 2021-03-25 NOTE — TELEPHONE ENCOUNTER
----- Message from Nichelleevon Anayapennyevon sent at 3/24/2021  5:46 PM EDT -----  Subject: Results Request    QUESTIONS  Which lab or imaging result is the patient calling about? [facility] Dexa Bon   Density  Which provider ordered the test? Jovanna Wright   At what location was the test performed? Date the test was performed? 2021-03-24  Additional Information for Provider? Andrew  ---------------------------------------------------------------------------  --------------  Lauren WONG  What is the best way for the office to contact you? OK to leave message on   voicemail  Preferred Call Back Phone Number?  [phone]

## 2021-03-26 ENCOUNTER — TELEPHONE (OUTPATIENT)
Dept: FAMILY MEDICINE CLINIC | Age: 76
End: 2021-03-26

## 2021-03-26 RX ORDER — ALENDRONATE SODIUM 70 MG/1
70 TABLET ORAL
Qty: 12 TABLET | Refills: 1 | Status: SHIPPED | OUTPATIENT
Start: 2021-03-26 | End: 2021-10-13

## 2021-05-26 ENCOUNTER — OFFICE VISIT (OUTPATIENT)
Dept: FAMILY MEDICINE CLINIC | Age: 76
End: 2021-05-26
Payer: MEDICARE

## 2021-05-26 VITALS — HEART RATE: 111 BPM | WEIGHT: 132.6 LBS | HEIGHT: 60 IN | OXYGEN SATURATION: 97 % | BODY MASS INDEX: 26.03 KG/M2

## 2021-05-26 DIAGNOSIS — N30.00 ACUTE CYSTITIS WITHOUT HEMATURIA: ICD-10-CM

## 2021-05-26 PROBLEM — R10.2 SUPRAPUBIC ABDOMINAL PAIN: Status: RESOLVED | Noted: 2020-10-14 | Resolved: 2021-05-26

## 2021-05-26 PROCEDURE — 99213 OFFICE O/P EST LOW 20 MIN: CPT | Performed by: FAMILY MEDICINE

## 2021-05-26 RX ORDER — CEPHALEXIN 500 MG/1
500 CAPSULE ORAL 3 TIMES DAILY
Qty: 21 CAPSULE | Refills: 0 | Status: SHIPPED | OUTPATIENT
Start: 2021-05-26 | End: 2021-06-02

## 2021-05-26 SDOH — ECONOMIC STABILITY: FOOD INSECURITY: WITHIN THE PAST 12 MONTHS, YOU WORRIED THAT YOUR FOOD WOULD RUN OUT BEFORE YOU GOT MONEY TO BUY MORE.: NEVER TRUE

## 2021-05-26 ASSESSMENT — PATIENT HEALTH QUESTIONNAIRE - PHQ9: SUM OF ALL RESPONSES TO PHQ QUESTIONS 1-9: 0

## 2021-05-26 ASSESSMENT — ENCOUNTER SYMPTOMS
VOMITING: 0
NAUSEA: 0

## 2021-05-26 ASSESSMENT — SOCIAL DETERMINANTS OF HEALTH (SDOH): HOW HARD IS IT FOR YOU TO PAY FOR THE VERY BASICS LIKE FOOD, HOUSING, MEDICAL CARE, AND HEATING?: NOT HARD AT ALL

## 2021-05-26 NOTE — PROGRESS NOTES
Subjective:     Patient Peggy Grijalva is a 76 y.o. female. Urinary Frequency   This is a new problem. The current episode started in the past 7 days. The problem occurs every urination. The problem has been unchanged. The quality of the pain is described as burning. The pain is mild. There has been no fever. She is not sexually active. There is no history of pyelonephritis. Associated symptoms include frequency. Pertinent negatives include no chills, discharge, flank pain, hematuria, hesitancy, nausea, possible pregnancy, sweats, urgency or vomiting. She has tried nothing for the symptoms. The treatment provided moderate relief. No Known Allergies    Current Outpatient Medications   Medication Sig Dispense Refill    cephALEXin (KEFLEX) 500 MG capsule Take 1 capsule by mouth 3 times daily for 7 days 21 capsule 0    alendronate (FOSAMAX) 70 MG tablet Take 1 tablet by mouth every 7 days 12 tablet 1    atorvastatin (LIPITOR) 20 MG tablet TAKE ONE TABLET BY MOUTH DAILY 90 tablet 3    Calcium Carbonate-Vitamin D (CALCIUM + D) 600-200 MG-UNIT TABS Take  by mouth 2 times daily. No current facility-administered medications for this visit.        Past Medical History:   Diagnosis Date    Encounter for cervical Pap smear with pelvic exam *Oct. 11,2019    Negative     Hyperlipidemia     Osteoporosis DEXA - Sept., 2011    Lumbar T score -2.5 and frm neck -1.8    Screening mammogram for high-risk patient November 10, 2011    Negative    Screening mammogram, encounter for *March 8, 2017    Negative    Screening mammogram, encounter for *March 14, 2018    Negative    Screening mammogram, encounter for *March 20, 2019    Negative    Screening mammogram, encounter for *Leidy 15, 2020    Negative    Vitamin D deficiency     Wrist fracture, left Mar., 2016    Left       Past Surgical History:   Procedure Laterality Date    COLONOSCOPY  Dec., 2002 ( 2012 )    Dr. Skylar Thompson - luna.    COLONOSCOPY Oct., 2013 (  )    Carly Flower - Normal    CYSTOSCOPY  *May 14, 2020    Dr. Sagar Ann - negative       Social History     Socioeconomic History    Marital status:      Spouse name: Peter Lee Number of children: 3    Years of education: Not on file    Highest education level: Not on file   Occupational History    Occupation: part time seamstress   Tobacco Use    Smoking status: Never Smoker    Smokeless tobacco: Never Used   Vaping Use    Vaping Use: Never used   Substance and Sexual Activity    Alcohol use: Yes     Alcohol/week: 0.0 standard drinks     Comment: occasional wine.  Drug use: Not on file    Sexual activity: Not on file   Other Topics Concern    Not on file   Social History Narrative    Living Will:  No.    Happily  and 3 kids here    Cooks and bakes,gardens--[employer]    Walks daily,yoga         Social Determinants of Health     Financial Resource Strain: Low Risk     Difficulty of Paying Living Expenses: Not hard at all   Food Insecurity: No Food Insecurity    Worried About Running Out of Food in the Last Year: Never true    [address] in the Last Year: Never true   Transportation Needs:     Lack of Transportation (Medical):  Lack of Transportation (Non-Medical):    Physical Activity:     Days of Exercise per Week:     Minutes of Exercise per Session:    Stress:     Feeling of Stress :    Social Connections:     Frequency of Communication with Friends and Family:     Frequency of Social Gatherings with Friends and Family:     Attends Hinduism Services:     Active Member of Clubs or Organizations:     Attends Club or Organization Meetings:     Marital Status:    Intimate Partner Violence:     Fear of Current or Ex-Partner:     Emotionally Abused:     Physically Abused:     Sexually Abused:        Family History   Problem Relation Age of Onset    Hypertension Father 80        , hypertension, facial tumor.     Cancer Mother 80        ,

## 2021-05-27 LAB
BILIRUBIN URINE: NEGATIVE
BLOOD, URINE: NEGATIVE
CLARITY: ABNORMAL
COLOR: YELLOW
EPITHELIAL CELLS, UA: 0 /HPF (ref 0–5)
GLUCOSE URINE: NEGATIVE MG/DL
HYALINE CASTS: 2 /LPF (ref 0–8)
KETONES, URINE: NEGATIVE MG/DL
LEUKOCYTE ESTERASE, URINE: ABNORMAL
MICROSCOPIC EXAMINATION: YES
NITRITE, URINE: NEGATIVE
PH UA: 7 (ref 5–8)
PROTEIN UA: ABNORMAL MG/DL
RBC UA: 10 /HPF (ref 0–4)
SPECIFIC GRAVITY UA: 1.02 (ref 1–1.03)
URINE TYPE: ABNORMAL
UROBILINOGEN, URINE: 0.2 E.U./DL
WBC UA: 131 /HPF (ref 0–5)

## 2021-05-29 LAB
ORGANISM: ABNORMAL
URINE CULTURE, ROUTINE: ABNORMAL
URINE CULTURE, ROUTINE: ABNORMAL

## 2021-10-04 ENCOUNTER — OFFICE VISIT (OUTPATIENT)
Dept: FAMILY MEDICINE CLINIC | Age: 76
End: 2021-10-04
Payer: MEDICARE

## 2021-10-04 VITALS
DIASTOLIC BLOOD PRESSURE: 80 MMHG | OXYGEN SATURATION: 96 % | SYSTOLIC BLOOD PRESSURE: 120 MMHG | BODY MASS INDEX: 26.07 KG/M2 | HEIGHT: 60 IN | HEART RATE: 102 BPM | WEIGHT: 132.8 LBS

## 2021-10-04 DIAGNOSIS — N30.00 ACUTE CYSTITIS WITHOUT HEMATURIA: Primary | ICD-10-CM

## 2021-10-04 DIAGNOSIS — N39.0 RECURRENT UTI: ICD-10-CM

## 2021-10-04 PROCEDURE — 99213 OFFICE O/P EST LOW 20 MIN: CPT | Performed by: FAMILY MEDICINE

## 2021-10-04 RX ORDER — CEPHALEXIN 500 MG/1
500 CAPSULE ORAL 3 TIMES DAILY
Qty: 21 CAPSULE | Refills: 0 | Status: SHIPPED | OUTPATIENT
Start: 2021-10-04 | End: 2021-10-11

## 2021-10-04 ASSESSMENT — PATIENT HEALTH QUESTIONNAIRE - PHQ9
2. FEELING DOWN, DEPRESSED OR HOPELESS: 0
SUM OF ALL RESPONSES TO PHQ QUESTIONS 1-9: 0
SUM OF ALL RESPONSES TO PHQ9 QUESTIONS 1 & 2: 0
1. LITTLE INTEREST OR PLEASURE IN DOING THINGS: 0

## 2021-10-04 ASSESSMENT — ENCOUNTER SYMPTOMS
NAUSEA: 0
VOMITING: 0

## 2021-10-04 NOTE — PROGRESS NOTES
Subjective:     Patient Tavia Sosa is a 68 y.o. female. Urinary Tract Infection   This is a new problem. The current episode started in the past 7 days. The problem occurs every urination. The problem has been unchanged. The quality of the pain is described as burning. The pain is mild. There has been no fever. She is sexually active. There is no history of pyelonephritis. Associated symptoms include frequency and urgency. Pertinent negatives include no chills, discharge, flank pain, hematuria, hesitancy, nausea, possible pregnancy, sweats or vomiting. She has tried nothing for the symptoms. The treatment provided no relief. There is no history of catheterization, kidney stones, recurrent UTIs, a single kidney, urinary stasis or a urological procedure. No Known Allergies    Current Outpatient Medications   Medication Sig Dispense Refill    cephALEXin (KEFLEX) 500 MG capsule Take 1 capsule by mouth 3 times daily for 7 days 21 capsule 0    alendronate (FOSAMAX) 70 MG tablet Take 1 tablet by mouth every 7 days 12 tablet 1    atorvastatin (LIPITOR) 20 MG tablet TAKE ONE TABLET BY MOUTH DAILY 90 tablet 3    Calcium Carbonate-Vitamin D (CALCIUM + D) 600-200 MG-UNIT TABS Take  by mouth 2 times daily. No current facility-administered medications for this visit.        Past Medical History:   Diagnosis Date    Encounter for cervical Pap smear with pelvic exam *Oct. 11,2019    Negative     Hyperlipidemia     Osteoporosis DEXA - Sept., 2011    Lumbar T score -2.5 and frm neck -1.8    Screening mammogram for high-risk patient November 10, 2011    Negative    Screening mammogram, encounter for *March 8, 2017    Negative    Screening mammogram, encounter for *March 14, 2018    Negative    Screening mammogram, encounter for *March 20, 2019    Negative    Screening mammogram, encounter for *Leidy 15, 2020    Negative    Vitamin D deficiency     Wrist fracture, left Mar., 2016    Left       Past Relation Age of Onset    Hypertension Father 80        , hypertension, facial tumor.  Cancer Mother 80        , carcinoma of the lung.  Cancer Brother         prostate    Breast Cancer Daughter 39       Immunization History   Administered Date(s) Administered    COVID-19, Pfizer, PF, 30mcg/0.3mL 2021, 2021    Influenza, High Dose (Fluzone 65 yrs and older) 10/15/2017, 2018, 10/11/2018, 2019    Influenza, Quadv, adjuvanted, 65 yrs +, IM, PF (Fluad) 10/20/2020    Pneumococcal Conjugate 13-valent ([id number]) 2016    Pneumococcal Polysaccharide ([id number]) 2011, 10/15/2017    Td, unspecified formulation 04/15/2008       Review of Systems  Review of Systems   Constitutional: Negative for chills. Gastrointestinal: Negative for nausea and vomiting. Genitourinary: Positive for frequency and urgency. Negative for flank pain, hematuria and hesitancy. Objective:   Physical Exam  Physical Exam  Cardiovascular:      Rate and Rhythm: Normal rate. Pulmonary:      Effort: No respiratory distress. Breath sounds: No rhonchi. Abdominal:      General: There is no distension. Palpations: There is no mass. Tenderness: There is abdominal tenderness. There is left CVA tenderness. There is no right CVA tenderness, guarding or rebound. Hernia: No hernia is present. Lymphadenopathy:      Cervical: No cervical adenopathy. Urine dipstick shows positive for leukocytes. Micro exam: tntc WBC's per HPF.     Assessment and Plan:     Acute cystitis without hematuria   Unclear control, changes made today: CX AND TREAT WITH KEFLEX--MAY NEED ABD/PELVIC U/S

## 2021-10-07 LAB
ORGANISM: ABNORMAL
URINE CULTURE, ROUTINE: ABNORMAL

## 2021-10-11 ENCOUNTER — HOSPITAL ENCOUNTER (OUTPATIENT)
Dept: ULTRASOUND IMAGING | Age: 76
Discharge: HOME | End: 2021-10-11
Payer: MEDICARE

## 2021-10-11 DIAGNOSIS — N39.0 RECURRENT UTI: ICD-10-CM

## 2021-10-11 DIAGNOSIS — N39.0 RECURRENT UTI: Primary | ICD-10-CM

## 2021-10-11 PROCEDURE — 76856 US EXAM PELVIC COMPLETE: CPT

## 2021-10-11 PROCEDURE — 76830 TRANSVAGINAL US NON-OB: CPT

## 2021-10-11 PROCEDURE — 76770 US EXAM ABDO BACK WALL COMP: CPT

## 2021-10-13 RX ORDER — ALENDRONATE SODIUM 70 MG/1
TABLET ORAL
Qty: 12 TABLET | Refills: 11 | Status: SHIPPED | OUTPATIENT
Start: 2021-10-13 | End: 2022-04-19

## 2021-11-22 ENCOUNTER — OFFICE VISIT (OUTPATIENT)
Dept: FAMILY MEDICINE CLINIC | Age: 76
End: 2021-11-22
Payer: MEDICARE

## 2021-11-22 VITALS
BODY MASS INDEX: 26.19 KG/M2 | DIASTOLIC BLOOD PRESSURE: 70 MMHG | HEIGHT: 60 IN | WEIGHT: 133.4 LBS | OXYGEN SATURATION: 97 % | HEART RATE: 72 BPM | SYSTOLIC BLOOD PRESSURE: 130 MMHG

## 2021-11-22 DIAGNOSIS — Z23 FLU VACCINE NEED: ICD-10-CM

## 2021-11-22 DIAGNOSIS — E78.5 HYPERLIPIDEMIA, UNSPECIFIED HYPERLIPIDEMIA TYPE: ICD-10-CM

## 2021-11-22 DIAGNOSIS — R35.1 NOCTURIA: ICD-10-CM

## 2021-11-22 DIAGNOSIS — Z00.00 WELL ADULT EXAM: Primary | ICD-10-CM

## 2021-11-22 LAB
A/G RATIO: 2.6 (ref 1.1–2.2)
ALBUMIN SERPL-MCNC: 4.6 G/DL (ref 3.4–5)
ALP BLD-CCNC: 77 U/L (ref 40–129)
ALT SERPL-CCNC: 18 U/L (ref 10–40)
ANION GAP SERPL CALCULATED.3IONS-SCNC: 15 MMOL/L (ref 3–16)
AST SERPL-CCNC: 19 U/L (ref 15–37)
BASOPHILS ABSOLUTE: 0 K/UL (ref 0–0.2)
BASOPHILS RELATIVE PERCENT: 0.5 %
BILIRUB SERPL-MCNC: 0.4 MG/DL (ref 0–1)
BUN BLDV-MCNC: 15 MG/DL (ref 7–20)
CALCIUM SERPL-MCNC: 9.7 MG/DL (ref 8.3–10.6)
CHLORIDE BLD-SCNC: 100 MMOL/L (ref 99–110)
CHOLESTEROL, TOTAL: 199 MG/DL (ref 0–199)
CO2: 23 MMOL/L (ref 21–32)
CREAT SERPL-MCNC: 0.6 MG/DL (ref 0.6–1.2)
EOSINOPHILS ABSOLUTE: 0.1 K/UL (ref 0–0.6)
EOSINOPHILS RELATIVE PERCENT: 1.3 %
GFR AFRICAN AMERICAN: >60
GFR NON-AFRICAN AMERICAN: >60
GLUCOSE BLD-MCNC: 102 MG/DL (ref 70–99)
HCT VFR BLD CALC: 35.4 % (ref 36–48)
HDLC SERPL-MCNC: 62 MG/DL (ref 40–60)
HEMOGLOBIN: 11.6 G/DL (ref 12–16)
LDL CHOLESTEROL CALCULATED: 110 MG/DL
LYMPHOCYTES ABSOLUTE: 1.7 K/UL (ref 1–5.1)
LYMPHOCYTES RELATIVE PERCENT: 27.6 %
MCH RBC QN AUTO: 28.2 PG (ref 26–34)
MCHC RBC AUTO-ENTMCNC: 32.6 G/DL (ref 31–36)
MCV RBC AUTO: 86.6 FL (ref 80–100)
MONOCYTES ABSOLUTE: 0.7 K/UL (ref 0–1.3)
MONOCYTES RELATIVE PERCENT: 10.8 %
NEUTROPHILS ABSOLUTE: 3.7 K/UL (ref 1.7–7.7)
NEUTROPHILS RELATIVE PERCENT: 59.8 %
PDW BLD-RTO: 15.4 % (ref 12.4–15.4)
PLATELET # BLD: 216 K/UL (ref 135–450)
PMV BLD AUTO: 8.5 FL (ref 5–10.5)
POTASSIUM SERPL-SCNC: 4.8 MMOL/L (ref 3.5–5.1)
RBC # BLD: 4.09 M/UL (ref 4–5.2)
SODIUM BLD-SCNC: 138 MMOL/L (ref 136–145)
TOTAL PROTEIN: 6.4 G/DL (ref 6.4–8.2)
TRIGL SERPL-MCNC: 135 MG/DL (ref 0–150)
VLDLC SERPL CALC-MCNC: 27 MG/DL
WBC # BLD: 6.2 K/UL (ref 4–11)

## 2021-11-22 PROCEDURE — 90694 VACC AIIV4 NO PRSRV 0.5ML IM: CPT | Performed by: FAMILY MEDICINE

## 2021-11-22 PROCEDURE — 81000 URINALYSIS NONAUTO W/SCOPE: CPT | Performed by: FAMILY MEDICINE

## 2021-11-22 PROCEDURE — G0008 ADMIN INFLUENZA VIRUS VAC: HCPCS | Performed by: FAMILY MEDICINE

## 2021-11-22 PROCEDURE — G0439 PPPS, SUBSEQ VISIT: HCPCS | Performed by: FAMILY MEDICINE

## 2021-11-22 RX ORDER — OXYBUTYNIN CHLORIDE 5 MG/1
5 TABLET ORAL NIGHTLY
Qty: 30 TABLET | Refills: 3 | Status: SHIPPED | OUTPATIENT
Start: 2021-11-22 | End: 2022-06-15

## 2021-11-22 ASSESSMENT — LIFESTYLE VARIABLES
HOW OFTEN DURING THE LAST YEAR HAVE YOU BEEN UNABLE TO REMEMBER WHAT HAPPENED THE NIGHT BEFORE BECAUSE YOU HAD BEEN DRINKING: 0
HOW OFTEN DURING THE LAST YEAR HAVE YOU FAILED TO DO WHAT WAS NORMALLY EXPECTED FROM YOU BECAUSE OF DRINKING: 0
HOW OFTEN DO YOU HAVE SIX OR MORE DRINKS ON ONE OCCASION: 0
HOW OFTEN DO YOU HAVE A DRINK CONTAINING ALCOHOL: 1
HOW OFTEN DURING THE LAST YEAR HAVE YOU NEEDED AN ALCOHOLIC DRINK FIRST THING IN THE MORNING TO GET YOURSELF GOING AFTER A NIGHT OF HEAVY DRINKING: 0
HAVE YOU OR SOMEONE ELSE BEEN INJURED AS A RESULT OF YOUR DRINKING: 0
HAS A RELATIVE, FRIEND, DOCTOR, OR ANOTHER HEALTH PROFESSIONAL EXPRESSED CONCERN ABOUT YOUR DRINKING OR SUGGESTED YOU CUT DOWN: 0
AUDIT-C TOTAL SCORE: 1
AUDIT TOTAL SCORE: 1
HOW OFTEN DURING THE LAST YEAR HAVE YOU FOUND THAT YOU WERE NOT ABLE TO STOP DRINKING ONCE YOU HAD STARTED: 0
HOW MANY STANDARD DRINKS CONTAINING ALCOHOL DO YOU HAVE ON A TYPICAL DAY: 0
HOW OFTEN DURING THE LAST YEAR HAVE YOU HAD A FEELING OF GUILT OR REMORSE AFTER DRINKING: 0

## 2021-11-22 ASSESSMENT — PATIENT HEALTH QUESTIONNAIRE - PHQ9
SUM OF ALL RESPONSES TO PHQ9 QUESTIONS 1 & 2: 0
SUM OF ALL RESPONSES TO PHQ QUESTIONS 1-9: 0
2. FEELING DOWN, DEPRESSED OR HOPELESS: 0
SUM OF ALL RESPONSES TO PHQ QUESTIONS 1-9: 0
SUM OF ALL RESPONSES TO PHQ QUESTIONS 1-9: 0
1. LITTLE INTEREST OR PLEASURE IN DOING THINGS: 0

## 2021-11-22 NOTE — PROGRESS NOTES
Medicare Annual Wellness Visit  Name: Daniel Gallegos Date: 2021   MRN: <[id number]> Sex: Female   Age: 68 y.o. Ethnicity: Non- / Non    : 1945 Race: White (non-)      Andrei Perez is here for Medicare AWV    Screenings for behavioral, psychosocial and functional/safety risks, and cognitive dysfunction are all negative except as indicated below. These results, as well as other patient data from the [address] form, are documented in Flowsheets linked to this Encounter. No Known Allergies      Prior to Visit Medications    Medication Sig Taking? Authorizing Provider   oxybutynin (DITROPAN) 5 MG tablet Take 1 tablet by mouth nightly Yes Trudy Galloway MD   alendronate (FOSAMAX) 70 MG tablet TAKE ONE TABLET EVERY 7 DAYS Yes Trudy Galloway MD   atorvastatin (LIPITOR) 20 MG tablet TAKE ONE TABLET BY MOUTH DAILY Yes Trudy Galloway MD   Calcium Carbonate-Vitamin D (CALCIUM + D) 600-200 MG-UNIT TABS Take  by mouth 2 times daily.    Yes Historical Provider, MD         Past Medical History:   Diagnosis Date    Encounter for cervical Pap smear with pelvic exam *Oct. 11,2019    Negative     Hyperlipidemia     Osteoporosis DEXA - 2011    Lumbar T score -2.5 and frm neck -1.8    Screening mammogram for high-risk patient November 10, 2011    Negative    Screening mammogram, encounter for *2017    Negative    Screening mammogram, encounter for *2018    Negative    Screening mammogram, encounter for *2019    Negative    Screening mammogram, encounter for *Leidy 15, 2020    Negative    Vitamin D deficiency     Wrist fracture, left Mar., 2016    Left       Past Surgical History:   Procedure Laterality Date    COLONOSCOPY  Dec., 2002 (  )    Dr. Cyndie Solano - normal.    COLONOSCOPY  Oct., 2013 (  )     - Normal    CYSTOSCOPY  *May 14, 2020    Dr. Sandeep Angelo - negative         Family History   Problem Relation Age of Onset    Hypertension Father 80        , hypertension, facial tumor.  Cancer Mother 80        , carcinoma of the lung.  Cancer Brother         prostate    Breast Cancer Daughter 39       CareTeam (Including outside providers/suppliers regularly involved in providing care):   Patient Care Team:  Jovita Chua MD as PCP - General (Family Medicine)  Jovita Chua MD as PCP - [facility] Empaneled Provider  Jyothi Segundo MD as Consulting Physician (Obstetrics & Gynecology)  Cande Kline MD as Consulting Physician (Otolaryngology)  Jovita Chua MD as Consulting Physician (Family Medicine)    Wt Readings from Last 3 Encounters:   21 133 lb 6.4 oz (60.5 kg)   10/04/21 132 lb 12.8 oz (60.2 kg)   21 132 lb 9.6 oz (60.1 kg)     Vitals:    21 1038   BP: 130/70   Pulse: 72   SpO2: 97%   Weight: 133 lb 6.4 oz (60.5 kg)   Height: 5' (1.524 m)     Body mass index is 26.05 kg/m². Based upon direct observation of the patient, evaluation of cognition reveals recent and remote memory intact.     General Appearance: alert and oriented to person, place and time, well developed and well- nourished, in no acute distress  Skin: warm and dry, no rash or erythema  Head: normocephalic and atraumatic  Eyes: pupils equal, round, and reactive to light, extraocular eye movements intact, conjunctivae normal  ENT: tympanic membrane, external ear and ear canal normal bilaterally, nose without deformity, nasal mucosa and turbinates normal without polyps  Neck: supple and non-tender without mass, no thyromegaly or thyroid nodules, no cervical lymphadenopathy  Pulmonary/Chest: clear to auscultation bilaterally- no wheezes, rales or rhonchi, normal air movement, no respiratory distress  Cardiovascular: normal rate, regular rhythm, normal S1 and S2, no murmurs, rubs, clicks, or gallops, distal pulses intact, no carotid bruits  Abdomen: soft, non-tender, non-distended, normal bowel sounds, no masses or organomegaly  Extremities: no cyanosis, clubbing or edema  Musculoskeletal: normal range of motion, no joint swelling, deformity or tenderness  Neurologic: reflexes normal and symmetric, no cranial nerve deficit, gait, coordination and speech normal  No visits with results within 1 Month(s) from this visit. Latest known visit with results is:   Office Visit on 10/04/2021   Component Date Value Ref Range Status    Organism 10/04/2021 Escherichia coli*  Final    Urine Culture, Routine 10/04/2021 >100,000 CFU/ml   Final     Urine dipstick shows negative for all components. Micro exam: negative for WBC's or RBC's. Patient's complete Health Risk Assessment and screening values have been reviewed and are found in Flowsheets. The following problems were reviewed today and where indicated follow up appointments were made and/or referrals ordered. Positive Risk Factor Screenings with Interventions:            General Health and ACP:  General  In general, how would you say your health is?: Excellent  In the past 7 days, have you experienced any of the following?  New or Increased Pain, New or Increased Fatigue, Loneliness, Social Isolation, Stress or Anger?: None of These  Do you get the social and emotional support that you need?: Yes  Do you have a Living Will?: Yes  Advance Directives     Power of [address] [place] [address] Will ACP-Advance Directive ACP-Power of     Not on File Not on File Not on File Not on File      General Health Risk Interventions:  · none     Hearing/Vision:  No exam data present  Hearing/Vision  Do you or your family notice any trouble with your hearing that hasn't been managed with hearing aids?: No  Do you have difficulty driving, watching TV, or doing any of your daily activities because of your eyesight?: No  Have you had an eye exam within the past year?: (!) No  Hearing/Vision Interventions:  · none    Safety:  Safety  Do you have working smoke detectors?: Future    Hyperlipidemia, unspecified hyperlipidemia type  -     Comprehensive Metabolic Panel; Future  -     Lipid Panel; Future    Flu vaccine need  -     INFLUENZA, QUADV, ADJUVANTED, 65 YRS =, IM, PF, PREFILL SYR, 0.5ML (FLUAD)    Other orders  -     oxybutynin (DITROPAN) 5 MG tablet;  Take 1 tablet by mouth nightly

## 2021-11-23 DIAGNOSIS — D64.9 ANEMIA, UNSPECIFIED TYPE: Primary | ICD-10-CM

## 2021-11-23 DIAGNOSIS — D64.9 ANEMIA, UNSPECIFIED TYPE: ICD-10-CM

## 2021-11-23 LAB
IRON SATURATION: 23 % (ref 15–50)
IRON: 80 UG/DL (ref 37–145)
TOTAL IRON BINDING CAPACITY: 345 UG/DL (ref 260–445)

## 2021-12-25 RX ORDER — ATORVASTATIN CALCIUM 20 MG/1
TABLET, FILM COATED ORAL
Qty: 90 TABLET | Refills: 3 | Status: SHIPPED | OUTPATIENT
Start: 2021-12-25

## 2022-03-14 ENCOUNTER — TELEPHONE (OUTPATIENT)
Dept: FAMILY MEDICINE CLINIC | Age: 77
End: 2022-03-14

## 2022-03-14 NOTE — TELEPHONE ENCOUNTER
----- Message from Terrie Wright sent at 3/12/2022  9:46 AM EST -----  Subject: Referral Request    QUESTIONS   Reason for referral request? Patient is needing a referral for a eye   doctor in her network and would like to know if Dr. Sejal Paz had any   recommendations . Has the physician seen you for this condition before? No   Preferred Specialist (if applicable)? Do you already have an appointment scheduled? No  Additional Information for Provider?   ---------------------------------------------------------------------------  --------------  CALL BACK INFO  What is the best way for the office to contact you? OK to leave message on   voicemail  Preferred Call Back Phone Number?  [phone]

## 2022-03-18 DIAGNOSIS — H34.8110 CENTRAL RETINAL VEIN OCCLUSION WITH MACULAR EDEMA OF RIGHT EYE: ICD-10-CM

## 2022-03-18 DIAGNOSIS — H34.8110 CENTRAL RETINAL VEIN OCCLUSION WITH MACULAR EDEMA OF RIGHT EYE: Primary | ICD-10-CM

## 2022-03-18 LAB
BASOPHILS ABSOLUTE: 0 K/UL (ref 0–0.2)
BASOPHILS RELATIVE PERCENT: 0.9 %
C-REACTIVE PROTEIN: <3 MG/L (ref 0–5.1)
EOSINOPHILS ABSOLUTE: 0.1 K/UL (ref 0–0.6)
EOSINOPHILS RELATIVE PERCENT: 1.7 %
HCT VFR BLD CALC: 33.8 % (ref 36–48)
HEMOGLOBIN: 11 G/DL (ref 12–16)
LYMPHOCYTES ABSOLUTE: 1.8 K/UL (ref 1–5.1)
LYMPHOCYTES RELATIVE PERCENT: 43.7 %
MCH RBC QN AUTO: 28.3 PG (ref 26–34)
MCHC RBC AUTO-ENTMCNC: 32.6 G/DL (ref 31–36)
MCV RBC AUTO: 86.9 FL (ref 80–100)
MONOCYTES ABSOLUTE: 0.6 K/UL (ref 0–1.3)
MONOCYTES RELATIVE PERCENT: 14 %
NEUTROPHILS ABSOLUTE: 1.6 K/UL (ref 1.7–7.7)
NEUTROPHILS RELATIVE PERCENT: 39.7 %
PDW BLD-RTO: 15.2 % (ref 12.4–15.4)
PLATELET # BLD: 170 K/UL (ref 135–450)
PMV BLD AUTO: 8.2 FL (ref 5–10.5)
RBC # BLD: 3.89 M/UL (ref 4–5.2)
SEDIMENTATION RATE, ERYTHROCYTE: 70 MM/HR (ref 0–30)
WBC # BLD: 4.1 K/UL (ref 4–11)

## 2022-03-19 DIAGNOSIS — R73.9 HYPERGLYCEMIA: ICD-10-CM

## 2022-03-19 DIAGNOSIS — H35.00 RETINOPATHY: Primary | ICD-10-CM

## 2022-03-19 DIAGNOSIS — H35.00 RETINOPATHY: ICD-10-CM

## 2022-03-19 LAB
ANION GAP SERPL CALCULATED.3IONS-SCNC: 12 MMOL/L (ref 3–16)
BUN BLDV-MCNC: 15 MG/DL (ref 7–20)
CALCIUM SERPL-MCNC: 9.5 MG/DL (ref 8.3–10.6)
CHLORIDE BLD-SCNC: 104 MMOL/L (ref 99–110)
CO2: 24 MMOL/L (ref 21–32)
CREAT SERPL-MCNC: 0.7 MG/DL (ref 0.6–1.2)
GFR AFRICAN AMERICAN: >60
GFR NON-AFRICAN AMERICAN: >60
GLUCOSE BLD-MCNC: 108 MG/DL (ref 70–99)
POTASSIUM SERPL-SCNC: 4.7 MMOL/L (ref 3.5–5.1)
SODIUM BLD-SCNC: 140 MMOL/L (ref 136–145)

## 2022-03-20 LAB
ESTIMATED AVERAGE GLUCOSE: 137 MG/DL
HBA1C MFR BLD: 6.4 %

## 2022-03-22 ENCOUNTER — TELEPHONE (OUTPATIENT)
Dept: FAMILY MEDICINE CLINIC | Age: 77
End: 2022-03-22

## 2022-03-22 NOTE — TELEPHONE ENCOUNTER
I see you discussed with her the labs from the 18th. She is calling regarding the A1C and BMP on the 19th. Is there any specifics you would like her to know?

## 2022-04-04 ENCOUNTER — TELEPHONE (OUTPATIENT)
Dept: FAMILY MEDICINE CLINIC | Age: 77
End: 2022-04-04

## 2022-04-04 NOTE — TELEPHONE ENCOUNTER
Saw the eye doctor - \"eye stroke\"  Sees fuzzy. She's been taking the steroids started 60 mgs - tapering dose  -  she is not clear/confused/like her head is heavy/forgetting    Biopsy is negative   She would like to talk to you.

## 2022-04-06 ENCOUNTER — OFFICE VISIT (OUTPATIENT)
Dept: FAMILY MEDICINE CLINIC | Age: 77
End: 2022-04-06
Payer: MEDICARE

## 2022-04-06 VITALS
OXYGEN SATURATION: 97 % | HEIGHT: 60 IN | DIASTOLIC BLOOD PRESSURE: 78 MMHG | TEMPERATURE: 97.5 F | SYSTOLIC BLOOD PRESSURE: 124 MMHG | HEART RATE: 105 BPM | BODY MASS INDEX: 25.91 KG/M2 | WEIGHT: 132 LBS

## 2022-04-06 DIAGNOSIS — H54.61 VISUAL LOSS, RIGHT EYE: ICD-10-CM

## 2022-04-06 DIAGNOSIS — R01.1 HEART MURMUR: ICD-10-CM

## 2022-04-06 DIAGNOSIS — I77.71 DISSECTION OF CAROTID ARTERY (HCC): ICD-10-CM

## 2022-04-06 PROCEDURE — 99213 OFFICE O/P EST LOW 20 MIN: CPT | Performed by: FAMILY MEDICINE

## 2022-04-06 ASSESSMENT — ENCOUNTER SYMPTOMS
BOWEL INCONTINENCE: 0
VOMITING: 0
VISUAL CHANGE: 1
BACK PAIN: 0
ABDOMINAL PAIN: 0
SHORTNESS OF BREATH: 0
NAUSEA: 0

## 2022-04-06 NOTE — PROGRESS NOTES
Subjective:     Patient Harriet Collazo is a 68 y.o. female. Neurologic Problem  The patient's primary symptoms include a visual change. The patient's pertinent negatives include no altered mental status, clumsiness, focal sensory loss, focal weakness, loss of balance, memory loss, near-syncope, slurred speech, syncope or weakness. Primary symptoms comment: R visual loss. This is a new problem. The current episode started 1 to 4 weeks ago. The neurological problem developed gradually. The problem is unchanged. There was right-sided focality noted. Pertinent negatives include no abdominal pain, auditory change, aura, back pain, bladder incontinence, bowel incontinence, chest pain, confusion, diaphoresis, dizziness, fatigue, fever, headaches, light-headedness, nausea, neck pain, palpitations, shortness of breath, vertigo or vomiting. Treatments tried: had bx done(neg)--steroids given. The treatment provided no relief. There is no history of a bleeding disorder, a clotting disorder, a CVA, dementia, head trauma, liver disease, mood changes or seizures. No Known Allergies    Current Outpatient Medications   Medication Sig Dispense Refill    atorvastatin (LIPITOR) 20 MG tablet TAKE ONE TABLET BY MOUTH DAILY 90 tablet 3    Calcium Carbonate-Vitamin D (CALCIUM + D) 600-200 MG-UNIT TABS Take  by mouth 2 times daily.  oxybutynin (DITROPAN) 5 MG tablet Take 1 tablet by mouth nightly (Patient not taking: Reported on 4/6/2022) 30 tablet 3    alendronate (FOSAMAX) 70 MG tablet TAKE ONE TABLET EVERY 7 DAYS (Patient not taking: Reported on 4/6/2022) 12 tablet 11     No current facility-administered medications for this visit.        Past Medical History:   Diagnosis Date    Encounter for cervical Pap smear with pelvic exam *Oct. 11,2019    Negative     Hyperlipidemia     Osteoporosis DEXA - Sept., 2011    Lumbar T score -2.5 and frm neck -1.8    Screening mammogram for high-risk patient November 10, 2011 Negative    Screening mammogram, encounter for *March 8, 2017    Negative    Screening mammogram, encounter for *March 14, 2018    Negative    Screening mammogram, encounter for *March 20, 2019    Negative    Screening mammogram, encounter for *Leidy 15, 2020    Negative    Vitamin D deficiency     Wrist fracture, left Mar., 2016    Left       Past Surgical History:   Procedure Laterality Date    COLONOSCOPY  Dec., 2002 ( 2012 )    Dr. Hal Ferris - normal.    COLONOSCOPY  Oct., 2013 ( 2023 )    Garrett Russoet - Normal    CYSTOSCOPY  *May 14, 2020    Dr. Anna Rose - negative       Social History     Socioeconomic History    Marital status:      Spouse name: Rhett Gonzalez Number of children: 3    Years of education: Not on file    Highest education level: Not on file   Occupational History    Occupation: part time seamstress   Tobacco Use    Smoking status: Never Smoker    Smokeless tobacco: Never Used   Vaping Use    Vaping Use: Never used   Substance and Sexual Activity    Alcohol use: Yes     Alcohol/week: 0.0 standard drinks     Comment: occasional wine.  Drug use: Not on file    Sexual activity: Not on file   Other Topics Concern    Not on file   Social History Narrative    Living Will:  No.    Happily  and 3 kids here    Cooks and bakes,gardens--PT seemstress    Walks daily,yoga         Social Determinants of Health     Financial Resource Strain: Low Risk     Difficulty of Paying Living Expenses: Not hard at all   Food Insecurity: No Food Insecurity    Worried About Running Out of Food in the Last Year: Never true    [address] in the Last Year: Never true   Transportation Needs:     Lack of Transportation (Medical): Not on file    Lack of Transportation (Non-Medical):  Not on file   Physical Activity:     Days of Exercise per Week: Not on file    Minutes of Exercise per Session: Not on file   Stress:     Feeling of Stress : Not on file   Social Connections:     Frequency of Communication with Friends and Family: Not on file    Frequency of Social Gatherings with Friends and Family: Not on file    Attends [employer]ist Services: Not on file    Active Member of Clubs or Organizations: Not on file    Attends Club or Organization Meetings: Not on file    Marital Status: Not on file   Intimate Partner Violence:     Fear of Current or Ex-Partner: Not on file    Emotionally Abused: Not on file    Physically Abused: Not on file    Sexually Abused: Not on file   Housing Stability:     Unable to Pay for Housing in the Last Year: Not on file    Number of [place] in the Last Year: Not on file    Unstable Housing in the Last Year: Not on file       Family History   Problem Relation Age of Onset    Hypertension Father 80        , hypertension, facial tumor.  Cancer Mother 80        , carcinoma of the lung.  Cancer Brother         prostate    Breast Cancer Daughter 39       Immunization History   Administered Date(s) Administered    COVID-19, Pfizer Purple top, DILUTE for use, 12+ yrs, 30mcg/0.3mL dose 2021, 2021, 11/10/2021    Influenza, High Dose (Fluzone 65 yrs and older) 10/15/2017, 2018, 10/11/2018, 2019    Influenza, Quadv, adjuvanted, 65 yrs +, IM, PF (Fluad) 10/20/2020, 2021    Pneumococcal Conjugate 13-valent ([id number]) 2016    Pneumococcal Polysaccharide ([id number]) 2011, 10/15/2017    Td, unspecified formulation 04/15/2008       Review of Systems  Review of Systems   Constitutional: Negative for diaphoresis, fatigue and fever. Respiratory: Negative for shortness of breath. Cardiovascular: Negative for chest pain, palpitations and near-syncope. Gastrointestinal: Negative for abdominal pain, bowel incontinence, nausea and vomiting. Genitourinary: Negative for bladder incontinence. Musculoskeletal: Negative for back pain and neck pain.    Neurological: Negative for dizziness, vertigo, focal weakness, syncope, weakness, light-headedness, headaches and loss of balance. Psychiatric/Behavioral: Negative for confusion and memory loss. Objective:   Physical Exam  Physical Exam    Assessment and Plan:     Visual loss, right eye   Neg temporal artery bx-on steroids--?  Hyperglycemia-labs--carotid doppler    Heart murmur   Needs echo

## 2022-04-07 DIAGNOSIS — H54.61 VISUAL LOSS, RIGHT EYE: ICD-10-CM

## 2022-04-07 LAB
ANION GAP SERPL CALCULATED.3IONS-SCNC: 12 MMOL/L (ref 3–16)
BASOPHILS ABSOLUTE: 0 K/UL (ref 0–0.2)
BASOPHILS RELATIVE PERCENT: 0.5 %
BUN BLDV-MCNC: 22 MG/DL (ref 7–20)
CALCIUM SERPL-MCNC: 9.3 MG/DL (ref 8.3–10.6)
CHLORIDE BLD-SCNC: 103 MMOL/L (ref 99–110)
CO2: 25 MMOL/L (ref 21–32)
CREAT SERPL-MCNC: 0.9 MG/DL (ref 0.6–1.2)
EOSINOPHILS ABSOLUTE: 0 K/UL (ref 0–0.6)
EOSINOPHILS RELATIVE PERCENT: 0.4 %
GFR AFRICAN AMERICAN: >60
GFR NON-AFRICAN AMERICAN: >60
GLUCOSE BLD-MCNC: 117 MG/DL (ref 70–99)
HCT VFR BLD CALC: 35.4 % (ref 36–48)
HEMOGLOBIN: 11.4 G/DL (ref 12–16)
LYMPHOCYTES ABSOLUTE: 2 K/UL (ref 1–5.1)
LYMPHOCYTES RELATIVE PERCENT: 23.8 %
MCH RBC QN AUTO: 28.6 PG (ref 26–34)
MCHC RBC AUTO-ENTMCNC: 32.1 G/DL (ref 31–36)
MCV RBC AUTO: 89 FL (ref 80–100)
MONOCYTES ABSOLUTE: 0.6 K/UL (ref 0–1.3)
MONOCYTES RELATIVE PERCENT: 7 %
NEUTROPHILS ABSOLUTE: 5.9 K/UL (ref 1.7–7.7)
NEUTROPHILS RELATIVE PERCENT: 68.3 %
PDW BLD-RTO: 17.1 % (ref 12.4–15.4)
PLATELET # BLD: 207 K/UL (ref 135–450)
PMV BLD AUTO: 8.2 FL (ref 5–10.5)
POTASSIUM SERPL-SCNC: 4.3 MMOL/L (ref 3.5–5.1)
RBC # BLD: 3.98 M/UL (ref 4–5.2)
SODIUM BLD-SCNC: 140 MMOL/L (ref 136–145)
WBC # BLD: 8.6 K/UL (ref 4–11)

## 2022-04-13 ENCOUNTER — HOSPITAL ENCOUNTER (OUTPATIENT)
Dept: VASCULAR LAB | Age: 77
Discharge: HOME | End: 2022-04-13
Payer: MEDICARE

## 2022-04-13 ENCOUNTER — HOSPITAL ENCOUNTER (OUTPATIENT)
Dept: NON INVASIVE DIAGNOSTICS | Age: 77
Discharge: HOME | End: 2022-04-13
Payer: MEDICARE

## 2022-04-13 DIAGNOSIS — I77.71 DISSECTION OF CAROTID ARTERY (HCC): ICD-10-CM

## 2022-04-13 DIAGNOSIS — H54.61 VISUAL LOSS, RIGHT EYE: ICD-10-CM

## 2022-04-13 DIAGNOSIS — R01.1 HEART MURMUR: ICD-10-CM

## 2022-04-13 LAB
LV EF: 58 %
LVEF MODALITY: NORMAL

## 2022-04-13 PROCEDURE — 93880 EXTRACRANIAL BILAT STUDY: CPT

## 2022-04-13 PROCEDURE — 93306 TTE W/DOPPLER COMPLETE: CPT

## 2022-04-14 PROBLEM — I65.23 BILATERAL CAROTID ARTERY STENOSIS: Status: ACTIVE | Noted: 2022-04-14

## 2022-04-19 ENCOUNTER — OFFICE VISIT (OUTPATIENT)
Dept: ENT CLINIC | Age: 77
End: 2022-04-19
Payer: MEDICARE

## 2022-04-19 VITALS
HEART RATE: 107 BPM | BODY MASS INDEX: 25.13 KG/M2 | DIASTOLIC BLOOD PRESSURE: 81 MMHG | HEIGHT: 60 IN | WEIGHT: 128 LBS | SYSTOLIC BLOOD PRESSURE: 138 MMHG

## 2022-04-19 DIAGNOSIS — H60.541 ACUTE ECZEMATOID OTITIS EXTERNA OF RIGHT EAR: Primary | ICD-10-CM

## 2022-04-19 PROCEDURE — 99203 OFFICE O/P NEW LOW 30 MIN: CPT | Performed by: OTOLARYNGOLOGY

## 2022-04-19 RX ORDER — FLUOCINOLONE ACETONIDE 0.11 MG/ML
4 OIL AURICULAR (OTIC) 2 TIMES DAILY
Qty: 1 EACH | Refills: 5 | Status: SHIPPED | OUTPATIENT
Start: 2022-04-19 | End: 2022-05-19

## 2022-04-19 RX ORDER — PREDNISONE 20 MG/1
TABLET ORAL
COMMUNITY
Start: 2022-03-21 | End: 2022-04-19

## 2022-04-19 ASSESSMENT — ENCOUNTER SYMPTOMS
EYE ITCHING: 0
SORE THROAT: 0
SINUS PAIN: 0
EYE REDNESS: 0
VOICE CHANGE: 0
FACIAL SWELLING: 0
NAUSEA: 0
TROUBLE SWALLOWING: 0
SINUS PRESSURE: 0
RHINORRHEA: 0
DIARRHEA: 0
CHOKING: 0
SHORTNESS OF BREATH: 0
COUGH: 0
EYE PAIN: 0

## 2022-04-19 NOTE — LETTER
[facility]  [address]  Phone: [phone]  Fax: [phone] Jazmine Goins MD    April 19, 2022     Ely Spatz, MD  [address] [facility]    Patient: Felton Lombard   MR Number: [id number]   YOB: 1945   Date of Visit: 4/19/2022       Dear Ely Spatz:    Thank you for referring Felton Lombard to me for evaluation/treatment. Below are the relevant portions of my assessment and plan of care. Diagnosis Orders   1. Acute eczematoid otitis externa of right ear  fluocinolone (DERMOTIC) 0.01 % OIL oil         10 days steroid drops. Stop cleaning ears. Follow up as needed. The patient was counseled for eczematous otitis externa and received a dermotic prescription medication(s) for this diagnosis. The mechanism of action for the prescription(s) were explained/reviewed. The appropriate usage was described and technique for topical use explained if appropriate. Questions from the patient were answered and the prescription(s) were e-prescribed to the appropriate pharmacy. \    If you have questions, please do not hesitate to call me. I look forward to following Keven Chang along with you.     Sincerely,      Tyrese Serna MD

## 2022-04-19 NOTE — PROGRESS NOTES
Subjective:      Patient ID: Kim Young is a 68 y.o. female. HPI  Hearing Loss HPI  CC: ear problem    General: Jose Meng is a(n) 68 y.o. female who presents with an 8 week history of right ear low grade ear pain and itching. Cleans ears daily. 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 No hearing loss  How lon weeks  Side:right  Prior audiogram:No  Previous episodes: no  Tinnitus:No  Otorrhea:No  Vertigo:No  Prior ear surgery: No  Ear trauma: No  History of hearing loss: No      Patient Active Problem List   Diagnosis    Hyperlipidemia    Adenomatous polyp of sigmoid colon    Acute cystitis without hematuria    Nocturia    Visual loss, right eye    Heart murmur    Bilateral carotid artery stenosis     Past Surgical History:   Procedure Laterality Date    COLONOSCOPY  Dec., 2002 (  )    Dr. Guanaco Stevens - normal.    COLONOSCOPY  Oct., 2013 (  )    Naya Kelly - Normal    CYSTOSCOPY  *May 14, 2020    Dr. Neil Moody - negative     Family History   Problem Relation Age of Onset    Hypertension Father 80        , hypertension, facial tumor.  Cancer Mother 80        , carcinoma of the lung.  Cancer Brother         prostate    Breast Cancer Daughter 39     Social History     Socioeconomic History    Marital status:      Spouse name: Vinicio Hubbard Number of children: 3    Years of education: Not on file    Highest education level: Not on file   Occupational History    Occupation: part time seamstress   Tobacco Use    Smoking status: Never Smoker    Smokeless tobacco: Never Used   Vaping Use    Vaping Use: Never used   Substance and Sexual Activity    Alcohol use: Yes     Alcohol/week: 0.0 standard drinks     Comment: occasional wine.     Drug use: Not on file    Sexual activity: Not on file   Other Topics Concern    Not on file   Social History Narrative    Living Will:  No.    Happily  and 3 kids here    Cooks and bakes,gardens--PT seemstress    Walks daily,yoga Social Determinants of Health     Financial Resource Strain: Low Risk     Difficulty of Paying Living Expenses: Not hard at all   Food Insecurity: No Food Insecurity    Worried About Running Out of Food in the Last Year: Never true    Martha of Food in the Last Year: Never true   Transportation Needs:     Lack of Transportation (Medical): Not on file    Lack of Transportation (Non-Medical): Not on file   Physical Activity:     Days of Exercise per Week: Not on file    Minutes of Exercise per Session: Not on file   Stress:     Feeling of Stress : Not on file   Social Connections:     Frequency of Communication with Friends and Family: Not on file    Frequency of Social Gatherings with Friends and Family: Not on file    Attends Jainism Services: Not on file    Active Member of [address] [place] [address] [employer] or Organizations: Not on file    Attends Club or Organization Meetings: Not on file    Marital Status: Not on file   Intimate Partner Violence:     Fear of Current or Ex-Partner: Not on file    Emotionally Abused: Not on file    Physically Abused: Not on file    Sexually Abused: Not on file   Housing Stability:     Unable to Pay for Housing in the Last Year: Not on file    Number of [place] in the Last Year: Not on file    Unstable Housing in the Last Year: Not on file       DRUG/FOOD ALLERGIES: Patient has no known allergies. CURRENT MEDICATIONS  Prior to Admission medications    Medication Sig Start Date End Date Taking? Authorizing Provider   atorvastatin (LIPITOR) 20 MG tablet TAKE ONE TABLET BY MOUTH DAILY 12/25/21  Yes Tao Brar MD   Calcium Carbonate-Vitamin D (CALCIUM + D) 600-200 MG-UNIT TABS Take  by mouth 2 times daily.      Yes Historical Provider, MD   oxybutynin (DITROPAN) 5 MG tablet Take 1 tablet by mouth nightly  Patient not taking: Reported on 4/6/2022 11/22/21   Tao Brar MD       Lab Studies:  Lab Results   Component Value Date    WBC 8.6 04/07/2022    HGB 11.4 (L) 04/07/2022    HCT 35.4 (L) 04/07/2022    MCV 89.0 04/07/2022     04/07/2022     Lab Results   Component Value Date    GLUCOSE 117 (H) 04/07/2022    BUN 22 (H) 04/07/2022    CREATININE 0.9 04/07/2022    K 4.3 04/07/2022     04/07/2022     04/07/2022    CALCIUM 9.3 04/07/2022     No results found for: MG  No results found for: PHOS  Lab Results   Component Value Date    ALKPHOS 77 11/22/2021    ALT 18 11/22/2021    AST 19 11/22/2021    BILITOT 0.4 11/22/2021    PROT 6.4 11/22/2021     Review of Systems   Constitutional: Negative for activity change, appetite change, chills, fatigue and fever. HENT: Positive for ear pain. Negative for congestion, ear discharge, facial swelling, hearing loss, nosebleeds, postnasal drip, rhinorrhea, sinus pressure, sinus pain, sneezing, sore throat, tinnitus, trouble swallowing and voice change. Eyes: Negative for pain, redness, itching and visual disturbance. Respiratory: Negative for cough, choking and shortness of breath. Gastrointestinal: Negative for diarrhea and nausea. Endocrine: Negative for cold intolerance and heat intolerance. Objective:   Physical Exam  Constitutional:       General: She is not in acute distress. Appearance: She is well-developed. HENT:      Head: Not macrocephalic and not microcephalic. No Solis's sign, contusion, right periorbital erythema, left periorbital erythema or laceration. Right Ear: Hearing, tympanic membrane, ear canal and external ear normal. No decreased hearing noted. No laceration, drainage, swelling or tenderness. No middle ear effusion. No foreign body. No mastoid tenderness. No hemotympanum. Tympanic membrane is not injected, perforated, retracted or bulging. Tympanic membrane has normal mobility. Left Ear: Hearing, tympanic membrane, ear canal and external ear normal. No decreased hearing noted. No laceration, drainage, swelling or tenderness. No middle ear effusion.  No foreign body. No mastoid tenderness. No hemotympanum. Tympanic membrane is not injected, perforated, retracted or bulging. Tympanic membrane has normal mobility. Ears:      [place] exam findings: does not lateralize. Right Rinne: AC > BC. Left Rinne: AC > BC. Nose: No mucosal edema or rhinorrhea. Mouth/Throat:      Pharynx: No oropharyngeal exudate or posterior oropharyngeal erythema. Tonsils: No tonsillar abscesses. Eyes:      Extraocular Movements:      Right eye: Normal extraocular motion and no nystagmus. Left eye: Normal extraocular motion and no nystagmus. Pupils:      Right eye: Pupil is reactive. Left eye: Pupil is reactive. Neck:      Thyroid: No thyroid mass or thyromegaly. Musculoskeletal:      Cervical back: Normal range of motion and neck supple. Lymphadenopathy:      Head:      Right side of head: No preauricular, posterior auricular or occipital adenopathy. Left side of head: No preauricular, posterior auricular or occipital adenopathy. Cervical:      Right cervical: No superficial, deep or posterior cervical adenopathy. Left cervical: No superficial, deep or posterior cervical adenopathy. Skin:     Findings: No bruising, erythema or lesion. Neurological:      Mental Status: She is alert and oriented to person, place, and time. Psychiatric:         Attention and Perception: Attention normal.         Mood and Affect: Mood normal.         Speech: Speech normal.         Assessment:       Diagnosis Orders   1. Acute eczematoid otitis externa of right ear  fluocinolone (DERMOTIC) 0.01 % OIL oil           Plan:      10 days steroid drops. Stop cleaning ears. Follow up as needed. The patient was counseled for eczematous otitis externa and received a dermotic prescription medication(s) for this diagnosis. The mechanism of action for the prescription(s) were explained/reviewed.  The appropriate usage was described and technique for topical use explained if appropriate. Questions from the patient were answered and the prescription(s) were e-prescribed to the appropriate pharmacy.      \        Chanel Grider MD

## 2022-05-04 ENCOUNTER — TELEPHONE (OUTPATIENT)
Dept: ENT CLINIC | Age: 77
End: 2022-05-04

## 2022-05-04 ENCOUNTER — TELEPHONE (OUTPATIENT)
Dept: FAMILY MEDICINE CLINIC | Age: 77
End: 2022-05-04

## 2022-05-04 NOTE — TELEPHONE ENCOUNTER
Pt states that she is using ear drops and they are not working. She has been using the ear drops for 2 weeks. Should she still go to the specialist to see if she can get a different rx? Pt is requesting for Caprice to call her back.     LOV: 4/6/22

## 2022-05-04 NOTE — TELEPHONE ENCOUNTER
Talked to patient - she said she'll call Dr. Herman Barber about the ear drops not working. She is going to fly to [place] next week and sometimes she has trouble with her ears when she flys. Is there anything she should do/take?

## 2022-05-12 ENCOUNTER — OFFICE VISIT (OUTPATIENT)
Dept: ENT CLINIC | Age: 77
End: 2022-05-12
Payer: MEDICARE

## 2022-05-12 DIAGNOSIS — H60.541 ACUTE ECZEMATOID OTITIS EXTERNA OF RIGHT EAR: Primary | ICD-10-CM

## 2022-05-12 PROCEDURE — 99212 OFFICE O/P EST SF 10 MIN: CPT | Performed by: OTOLARYNGOLOGY

## 2022-05-12 NOTE — PROGRESS NOTES
Subjective:      Patient ID: Richelle Negron is a 68 y.o. female. HPI  Hearing Loss HPI  CC: ear problem    General: Cirara Lion is a(n) 68 y.o. female who presents with an 8 week history of right ear low grade ear pain and itching. Cleans ears daily. 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 No hearing loss  How lon weeks  Side:right  Prior audiogram:No  Previous episodes: no  Tinnitus:No  Otorrhea:No  Vertigo:No  Prior ear surgery: No  Ear trauma: No  History of hearing loss: No      Patient Active Problem List   Diagnosis    Hyperlipidemia    Adenomatous polyp of sigmoid colon    Acute cystitis without hematuria    Nocturia    Visual loss, right eye    Heart murmur    Bilateral carotid artery stenosis     Past Surgical History:   Procedure Laterality Date    COLONOSCOPY  Dec., 2002 (  )    Dr. Ortiz Forth - normal.    COLONOSCOPY  Oct., 2013 (  )    Mortimer Brain - Normal    CYSTOSCOPY  *May 14, 2020    Dr. No Liang - negative     Family History   Problem Relation Age of Onset    Hypertension Father 80        , hypertension, facial tumor.  Cancer Mother 80        , carcinoma of the lung.  Cancer Brother         prostate    Breast Cancer Daughter 39     Social History     Socioeconomic History    Marital status:      Spouse name: James Taveras Number of children: 3    Years of education: Not on file    Highest education level: Not on file   Occupational History    Occupation: part time seamstress   Tobacco Use    Smoking status: Never Smoker    Smokeless tobacco: Never Used   Vaping Use    Vaping Use: Never used   Substance and Sexual Activity    Alcohol use: Yes     Alcohol/week: 0.0 standard drinks     Comment: occasional wine.     Drug use: Not on file    Sexual activity: Not on file   Other Topics Concern    Not on file   Social History Narrative    Living Will:  No.    Happily  and 3 kids here    Cooks and bakes,gardens--PT seemstress    Walks daily,yoga Social Determinants of Health     Financial Resource Strain: Low Risk     Difficulty of Paying Living Expenses: Not hard at all   Food Insecurity: No Food Insecurity    Worried About Running Out of Food in the Last Year: Never true    Martha of Food in the Last Year: Never true   Transportation Needs:     Lack of Transportation (Medical): Not on file    Lack of Transportation (Non-Medical): Not on file   Physical Activity:     Days of Exercise per Week: Not on file    Minutes of Exercise per Session: Not on file   Stress:     Feeling of Stress : Not on file   Social Connections:     Frequency of Communication with Friends and Family: Not on file    Frequency of Social Gatherings with Friends and Family: Not on file    Attends Evangelical Services: Not on file    Active Member of [address] [place] [address] [employer] or Organizations: Not on file    Attends Club or Organization Meetings: Not on file    Marital Status: Not on file   Intimate Partner Violence:     Fear of Current or Ex-Partner: Not on file    Emotionally Abused: Not on file    Physically Abused: Not on file    Sexually Abused: Not on file   Housing Stability:     Unable to Pay for Housing in the Last Year: Not on file    Number of [place] in the Last Year: Not on file    Unstable Housing in the Last Year: Not on file       DRUG/FOOD ALLERGIES: Patient has no known allergies. CURRENT MEDICATIONS  Prior to Admission medications    Medication Sig Start Date End Date Taking? Authorizing Provider   atorvastatin (LIPITOR) 20 MG tablet TAKE ONE TABLET BY MOUTH DAILY 12/25/21  Yes Cheyenne Amaral MD   Calcium Carbonate-Vitamin D (CALCIUM + D) 600-200 MG-UNIT TABS Take  by mouth 2 times daily.      Yes Historical Provider, MD   oxybutynin (DITROPAN) 5 MG tablet Take 1 tablet by mouth nightly  Patient not taking: Reported on 4/6/2022 11/22/21   Cheyenne Amaral MD       Lab Studies:  Lab Results   Component Value Date    WBC 8.6 04/07/2022    HGB 11.4 (L) 04/07/2022    HCT 35.4 (L) 04/07/2022    MCV 89.0 04/07/2022     04/07/2022     Lab Results   Component Value Date    GLUCOSE 117 (H) 04/07/2022    BUN 22 (H) 04/07/2022    CREATININE 0.9 04/07/2022    K 4.3 04/07/2022     04/07/2022     04/07/2022    CALCIUM 9.3 04/07/2022     No results found for: MG  No results found for: PHOS  Lab Results   Component Value Date    ALKPHOS 77 11/22/2021    ALT 18 11/22/2021    AST 19 11/22/2021    BILITOT 0.4 11/22/2021    PROT 6.4 11/22/2021     Review of Systems   Constitutional: Negative for activity change, appetite change, chills, fatigue and fever. HENT: Positive for ear pain. Negative for congestion, ear discharge, facial swelling, hearing loss, nosebleeds, postnasal drip, rhinorrhea, sinus pressure, sinus pain, sneezing, sore throat, tinnitus, trouble swallowing and voice change. Eyes: Negative for pain, redness, itching and visual disturbance. Respiratory: Negative for cough, choking and shortness of breath. Gastrointestinal: Negative for diarrhea and nausea. Endocrine: Negative for cold intolerance and heat intolerance. Objective:   Physical Exam  Constitutional:       General: She is not in acute distress. Appearance: She is well-developed. HENT:      Head: Not macrocephalic and not microcephalic. No Solis's sign, contusion, right periorbital erythema, left periorbital erythema or laceration. Right Ear: Hearing, tympanic membrane, ear canal and external ear normal. No decreased hearing noted. No laceration, drainage, swelling or tenderness. No middle ear effusion. No foreign body. No mastoid tenderness. No hemotympanum. Tympanic membrane is not injected, perforated, retracted or bulging. Tympanic membrane has normal mobility. Left Ear: Hearing, tympanic membrane, ear canal and external ear normal. No decreased hearing noted. No laceration, drainage, swelling or tenderness. No middle ear effusion.  No foreign body. No mastoid tenderness. No hemotympanum. Tympanic membrane is not injected, perforated, retracted or bulging. Tympanic membrane has normal mobility. Ears:     Imrpoved appearance of the EACs bilateral. No fluid. No negative pressure. Nose: No mucosal edema or rhinorrhea. Mouth/Throat:      Pharynx: No oropharyngeal exudate or posterior oropharyngeal erythema. Tonsils: No tonsillar abscesses. Eyes:      Extraocular Movements:      Right eye: Normal extraocular motion and no nystagmus. Left eye: Normal extraocular motion and no nystagmus. Pupils:      Right eye: Pupil is reactive. Left eye: Pupil is reactive. Neck:      Thyroid: No thyroid mass or thyromegaly. Musculoskeletal:      Cervical back: Normal range of motion and neck supple. Lymphadenopathy:      Head:      Right side of head: No preauricular, posterior auricular or occipital adenopathy. Left side of head: No preauricular, posterior auricular or occipital adenopathy. Cervical:      Right cervical: No superficial, deep or posterior cervical adenopathy. Left cervical: No superficial, deep or posterior cervical adenopathy. Skin:     Findings: No bruising, erythema or lesion. Neurological:      Mental Status: She is alert and oriented to person, place, and time. Psychiatric:         Attention and Perception: Attention normal.         Mood and Affect: Mood normal.         Speech: Speech normal.         Assessment:       Diagnosis Orders   1. Acute eczematoid otitis externa of right ear  fluocinolone (DERMOTIC) 0.01 % OIL oil           Plan:      5 days steroid drops. Follow with mineral oil drops every other day. Stop cleaning ears.    Consider audiogram          \        Vicky Zavala MD

## 2022-06-15 ENCOUNTER — OFFICE VISIT (OUTPATIENT)
Dept: FAMILY MEDICINE CLINIC | Age: 77
End: 2022-06-15
Payer: MEDICARE

## 2022-06-15 VITALS
DIASTOLIC BLOOD PRESSURE: 80 MMHG | OXYGEN SATURATION: 97 % | BODY MASS INDEX: 25.01 KG/M2 | WEIGHT: 127.4 LBS | HEART RATE: 72 BPM | HEIGHT: 60 IN | SYSTOLIC BLOOD PRESSURE: 120 MMHG

## 2022-06-15 DIAGNOSIS — K29.70 GASTRITIS WITHOUT BLEEDING, UNSPECIFIED CHRONICITY, UNSPECIFIED GASTRITIS TYPE: ICD-10-CM

## 2022-06-15 PROCEDURE — 1123F ACP DISCUSS/DSCN MKR DOCD: CPT | Performed by: FAMILY MEDICINE

## 2022-06-15 PROCEDURE — 99213 OFFICE O/P EST LOW 20 MIN: CPT | Performed by: FAMILY MEDICINE

## 2022-06-15 RX ORDER — ASPIRIN 81 MG/1
81 TABLET ORAL DAILY
Status: ON HOLD | COMMUNITY
End: 2022-07-07 | Stop reason: HOSPADM

## 2022-06-15 RX ORDER — PANTOPRAZOLE SODIUM 40 MG/1
40 TABLET, DELAYED RELEASE ORAL
Qty: 30 TABLET | Refills: 3 | Status: SHIPPED | OUTPATIENT
Start: 2022-06-15 | End: 2022-10-31

## 2022-06-15 SDOH — ECONOMIC STABILITY: FOOD INSECURITY: WITHIN THE PAST 12 MONTHS, THE FOOD YOU BOUGHT JUST DIDN'T LAST AND YOU DIDN'T HAVE MONEY TO GET MORE.: NEVER TRUE

## 2022-06-15 SDOH — ECONOMIC STABILITY: FOOD INSECURITY: WITHIN THE PAST 12 MONTHS, YOU WORRIED THAT YOUR FOOD WOULD RUN OUT BEFORE YOU GOT MONEY TO BUY MORE.: NEVER TRUE

## 2022-06-15 ASSESSMENT — PATIENT HEALTH QUESTIONNAIRE - PHQ9
SUM OF ALL RESPONSES TO PHQ QUESTIONS 1-9: 0
SUM OF ALL RESPONSES TO PHQ QUESTIONS 1-9: 0
1. LITTLE INTEREST OR PLEASURE IN DOING THINGS: 0
2. FEELING DOWN, DEPRESSED OR HOPELESS: 0
SUM OF ALL RESPONSES TO PHQ QUESTIONS 1-9: 0
SUM OF ALL RESPONSES TO PHQ9 QUESTIONS 1 & 2: 0
SUM OF ALL RESPONSES TO PHQ QUESTIONS 1-9: 0

## 2022-06-15 ASSESSMENT — ENCOUNTER SYMPTOMS
HEMATOCHEZIA: 0
BACK PAIN: 0
ABDOMINAL PAIN: 1
ODYNOPHAGIA: 0
BLOATING: 0
TENESMUS: 0
VOMITING: 0
JAUNDICE: 0
HEMATEMESIS: 0
NAUSEA: 1
CONSTIPATION: 0
DIARRHEA: 0

## 2022-06-15 ASSESSMENT — SOCIAL DETERMINANTS OF HEALTH (SDOH): HOW HARD IS IT FOR YOU TO PAY FOR THE VERY BASICS LIKE FOOD, HOUSING, MEDICAL CARE, AND HEATING?: NOT HARD AT ALL

## 2022-06-15 NOTE — PROGRESS NOTES
*March 20, 2019    Negative    Screening mammogram, encounter for *Leidy 15, 2020    Negative    Vitamin D deficiency     Wrist fracture, left Mar., 2016    Left       Past Surgical History:   Procedure Laterality Date    COLONOSCOPY  Dec., 2002 ( 2012 )    Dr. Mike Brito - normal.    COLONOSCOPY  Oct., 2013 ( 2023 )    Fred Neves - Normal    CYSTOSCOPY  *May 14, 2020    Dr. Indira Whitman - negative       Social History     Socioeconomic History    Marital status:      Spouse name: Devonte Leslie Number of children: 3    Years of education: Not on file    Highest education level: Not on file   Occupational History    Occupation: part time seamstress   Tobacco Use    Smoking status: Never Smoker    Smokeless tobacco: Never Used   Vaping Use    Vaping Use: Never used   Substance and Sexual Activity    Alcohol use: Yes     Alcohol/week: 0.0 standard drinks     Comment: occasional wine.  Drug use: Not on file    Sexual activity: Not on file   Other Topics Concern    Not on file   Social History Narrative    Living Will:  No.    Happily  and 3 kids here    Cooks and bakes,gardens--PT seemstress    Walks daily,yoga         Social Determinants of Health     Financial Resource Strain: Low Risk     Difficulty of Paying Living Expenses: Not hard at all   Food Insecurity: No Food Insecurity    Worried About Running Out of Food in the Last Year: Never true    [address] in the Last Year: Never true   Transportation Needs:     Lack of Transportation (Medical): Not on file    Lack of Transportation (Non-Medical):  Not on file   Physical Activity:     Days of Exercise per Week: Not on file    Minutes of Exercise per Session: Not on file   Stress:     Feeling of Stress : Not on file   Social Connections:     Frequency of Communication with Friends and Family: Not on file    Frequency of Social Gatherings with Friends and Family: Not on file    Attends Baptism Services: Not on file   [employer] or Organizations: Not on file    Attends Club or Organization Meetings: Not on file    Marital Status: Not on file   Intimate Partner Violence:     Fear of Current or Ex-Partner: Not on file    Emotionally Abused: Not on file    Physically Abused: Not on file    Sexually Abused: Not on file   Housing Stability:     Unable to Pay for Housing in the Last Year: Not on file    Number of [place] in the Last Year: Not on file    Unstable Housing in the Last Year: Not on file       Family History   Problem Relation Age of Onset    Hypertension Father 80        , hypertension, facial tumor.  Cancer Mother 80        , carcinoma of the lung.  Cancer Brother         prostate    Breast Cancer Daughter 39       Immunization History   Administered Date(s) Administered    COVID-19, Pfizer Purple top, DILUTE for use, 12+ yrs, 30mcg/0.3mL dose 2021, 2021, 11/10/2021    Influenza, High Dose (Fluzone 65 yrs and older) 10/15/2017, 2018, 10/11/2018, 2019    Influenza, Quadv, adjuvanted, 65 yrs +, IM, PF (Fluad) 10/20/2020, 2021    Pneumococcal Conjugate 13-valent ([id number]) 2016    Pneumococcal Polysaccharide ([id number]) 2011, 10/15/2017    Td, unspecified formulation 04/15/2008       Review of Systems  Review of Systems   Constitutional: Positive for fatigue. Negative for chills, fever and weight loss. Gastrointestinal: Positive for abdominal pain, anorexia and nausea. Negative for bloating, constipation, diarrhea, dysphagia, hematemesis, hematochezia, jaundice, melena and vomiting. Genitourinary: Negative for dysuria. Musculoskeletal: Negative for arthralgias, back pain and myalgias. Skin: Negative for itching and rash. Objective:   Physical Exam  Physical Exam  Vitals reviewed. Constitutional:       General: She is not in acute distress. Appearance: She is well-developed.    Eyes:      Conjunctiva/sclera: Conjunctivae normal. Pupils: Pupils are equal, round, and reactive to light. Neck:      Thyroid: No thyromegaly. Vascular: No JVD. Trachea: No tracheal deviation. Cardiovascular:      Rate and Rhythm: Normal rate and regular rhythm. Heart sounds: Normal heart sounds. No murmur heard. No gallop. Pulmonary:      Effort: Pulmonary effort is normal. No respiratory distress. Breath sounds: Normal breath sounds. No stridor. No wheezing or rales. Chest:      Chest wall: No tenderness. Abdominal:      General: Bowel sounds are normal. There is no distension. Palpations: Abdomen is soft. There is no mass. Tenderness: There is abdominal tenderness (epigastric). Musculoskeletal:         General: No tenderness. Lymphadenopathy:      Cervical: No cervical adenopathy. Skin:     General: Skin is warm and dry. Coloration: Skin is not pale. Findings: No erythema or rash. Neurological:      Mental Status: She is alert and oriented to person, place, and time. Cranial Nerves: No cranial nerve deficit. Motor: No abnormal muscle tone. Coordination: Coordination normal.      Deep Tendon Reflexes: Reflexes normal.   Psychiatric:         Behavior: Behavior normal.         Thought Content:  Thought content normal.         Judgment: Judgment normal.         Assessment and Plan:     Gastritis   Likely due to NSAID use---protonix --f/u in 1 m

## 2022-07-01 ENCOUNTER — HOSPITAL ENCOUNTER (OUTPATIENT)
Dept: GENERAL RADIOLOGY | Age: 77
Discharge: HOME | DRG: 841 | End: 2022-07-01
Payer: MEDICARE

## 2022-07-01 ENCOUNTER — OFFICE VISIT (OUTPATIENT)
Dept: FAMILY MEDICINE CLINIC | Age: 77
End: 2022-07-01
Payer: MEDICARE

## 2022-07-01 ENCOUNTER — HOSPITAL ENCOUNTER (OUTPATIENT)
Age: 77
Discharge: HOME | DRG: 841 | End: 2022-07-01
Payer: MEDICARE

## 2022-07-01 VITALS
OXYGEN SATURATION: 99 % | HEART RATE: 91 BPM | BODY MASS INDEX: 23.63 KG/M2 | WEIGHT: 121 LBS | DIASTOLIC BLOOD PRESSURE: 78 MMHG | SYSTOLIC BLOOD PRESSURE: 122 MMHG | TEMPERATURE: 97.2 F

## 2022-07-01 DIAGNOSIS — R53.83 OTHER FATIGUE: ICD-10-CM

## 2022-07-01 DIAGNOSIS — R10.13 EPIGASTRIC ABDOMINAL PAIN: ICD-10-CM

## 2022-07-01 DIAGNOSIS — K29.70 GASTRITIS WITHOUT BLEEDING, UNSPECIFIED CHRONICITY, UNSPECIFIED GASTRITIS TYPE: ICD-10-CM

## 2022-07-01 DIAGNOSIS — R10.13 EPIGASTRIC ABDOMINAL PAIN: Primary | ICD-10-CM

## 2022-07-01 PROBLEM — N30.00 ACUTE CYSTITIS WITHOUT HEMATURIA: Status: RESOLVED | Noted: 2021-05-26 | Resolved: 2022-07-01

## 2022-07-01 PROCEDURE — 71046 X-RAY EXAM CHEST 2 VIEWS: CPT

## 2022-07-01 PROCEDURE — 1123F ACP DISCUSS/DSCN MKR DOCD: CPT | Performed by: FAMILY MEDICINE

## 2022-07-01 PROCEDURE — 99214 OFFICE O/P EST MOD 30 MIN: CPT | Performed by: FAMILY MEDICINE

## 2022-07-01 ASSESSMENT — ENCOUNTER SYMPTOMS
COUGH: 0
ABDOMINAL PAIN: 1
NAUSEA: 0
CONSTIPATION: 0
CHANGE IN BOWEL HABIT: 1
HEMATOCHEZIA: 0
SWOLLEN GLANDS: 0
VISUAL CHANGE: 0
DIARRHEA: 0
BELCHING: 0
SORE THROAT: 0
FLATUS: 0
VOMITING: 0

## 2022-07-01 ASSESSMENT — CROHNS DISEASE ACTIVITY INDEX (CDAI): CDAI SCORE: 0

## 2022-07-01 NOTE — PROGRESS NOTES
Carbonate-Vitamin D (CALCIUM + D) 600-200 MG-UNIT TABS Take  by mouth 2 times daily. (Patient not taking: Reported on 7/1/2022)       No current facility-administered medications for this visit. Past Medical History:   Diagnosis Date    Bilateral carotid artery stenosis 4/14/2022    Bilateral carotid artery stenosis 4/14/2022    Encounter for cervical Pap smear with pelvic exam *Oct. 11,2019    Negative     Hyperlipidemia     Osteoporosis DEXA - Sept., 2011    Lumbar T score -2.5 and frm neck -1.8    Screening mammogram for high-risk patient November 10, 2011    Negative    Screening mammogram, encounter for *March 8, 2017    Negative    Screening mammogram, encounter for *March 14, 2018    Negative    Screening mammogram, encounter for *March 20, 2019    Negative    Screening mammogram, encounter for *Leidy 15, 2020    Negative    Vitamin D deficiency     Wrist fracture, left Mar., 2016    Left       Past Surgical History:   Procedure Laterality Date    COLONOSCOPY  Dec., 2002 ( 2012 )    Dr. Awilda Ardon - normal.    COLONOSCOPY  Oct., 2013 ( 2023 )    Laisha Jean - Normal    CYSTOSCOPY  *May 14, 2020    Dr. Rafia Robbins - negative       Social History     Socioeconomic History    Marital status:      Spouse name: Javon Paulino Number of children: 3    Years of education: Not on file    Highest education level: Not on file   Occupational History    Occupation: part time seamstress   Tobacco Use    Smoking status: Never Smoker    Smokeless tobacco: Never Used   Vaping Use    Vaping Use: Never used   Substance and Sexual Activity    Alcohol use: Yes     Alcohol/week: 0.0 standard drinks     Comment: occasional wine.     Drug use: Not on file    Sexual activity: Not on file   Other Topics Concern    Not on file   Social History Narrative    Living Will:  No.    Happily  and 3 kids here    Cooks and bakes,gardens--PT seemstress    Walks daily,yoga         Social Determinants of Health Financial Resource Strain: Low Risk     Difficulty of Paying Living Expenses: Not hard at all   Food Insecurity: No Food Insecurity    Worried About Running Out of Food in the Last Year: Never true    Martha of Food in the Last Year: Never true   Transportation Needs:     Lack of Transportation (Medical): Not on file    Lack of Transportation (Non-Medical): Not on file   Physical Activity:     Days of Exercise per Week: Not on file    Minutes of Exercise per Session: Not on file   Stress:     Feeling of Stress : Not on file   Social Connections:     Frequency of Communication with Friends and Family: Not on file    Frequency of Social Gatherings with Friends and Family: Not on file    Attends Sabianism Services: Not on file    Active Member of [address] [place] [address] [employer] or Organizations: Not on file    Attends Club or Organization Meetings: Not on file    Marital Status: Not on file   Intimate Partner Violence:     Fear of Current or Ex-Partner: Not on file    Emotionally Abused: Not on file    Physically Abused: Not on file    Sexually Abused: Not on file   Housing Stability:     Unable to Pay for Housing in the Last Year: Not on file    Number of [place] in the Last Year: Not on file    Unstable Housing in the Last Year: Not on file       Family History   Problem Relation Age of Onset    Hypertension Father 80        , hypertension, facial tumor.  Cancer Mother 80        , carcinoma of the lung.      Cancer Brother         prostate    Breast Cancer Daughter 39       Immunization History   Administered Date(s) Administered    COVID-19, PFIZER PURPLE top, DILUTE for use, (age 15 y+), 30mcg/0.3mL 2021, 2021, 11/10/2021    Influenza, High Dose (Fluzone 65 yrs and older) 10/15/2017, 2018, 10/11/2018, 2019    Influenza, Quadv, adjuvanted, 65 yrs +, IM, PF (Fluad) 10/20/2020, 2021    Pneumococcal Conjugate 13-valent ([id number]) 2016    Pneumococcal Polysaccharide ([id number]) 02/23/2011, 10/15/2017    Td, unspecified formulation 04/15/2008       Review of Systems  Review of Systems   Constitutional: Positive for fatigue. Negative for chills, diaphoresis, fever and weight loss. HENT: Negative for congestion and sore throat. Respiratory: Negative for cough. Cardiovascular: Negative for chest pain. Gastrointestinal: Positive for abdominal pain, anorexia and change in bowel habit. Negative for constipation, diarrhea, flatus, hematochezia, melena, nausea and vomiting. Genitourinary: Negative for dysuria, frequency and hematuria. Musculoskeletal: Negative for arthralgias, joint swelling, myalgias and neck pain. Skin: Negative for rash. Neurological: Negative for vertigo, weakness, numbness and headaches. Objective:   Physical Exam  Physical Exam    Assessment and Plan:     No problem-specific Assessment & Plan notes found for this encounter.

## 2022-07-02 ENCOUNTER — APPOINTMENT (OUTPATIENT)
Dept: CT IMAGING | Age: 77
DRG: 841 | End: 2022-07-02
Payer: MEDICARE

## 2022-07-02 ENCOUNTER — HOSPITAL ENCOUNTER (INPATIENT)
Age: 77
LOS: 5 days | Discharge: HOME | DRG: 841 | End: 2022-07-07
Attending: EMERGENCY MEDICINE | Admitting: INTERNAL MEDICINE
Payer: MEDICARE

## 2022-07-02 DIAGNOSIS — N17.9 ACUTE RENAL FAILURE, UNSPECIFIED ACUTE RENAL FAILURE TYPE (HCC): Primary | ICD-10-CM

## 2022-07-02 DIAGNOSIS — K92.1 MELENA: ICD-10-CM

## 2022-07-02 DIAGNOSIS — N17.9 AKI (ACUTE KIDNEY INJURY) (HCC): Primary | ICD-10-CM

## 2022-07-02 PROBLEM — D64.9 ANEMIA: Status: ACTIVE | Noted: 2022-07-02

## 2022-07-02 PROBLEM — R10.13 EPIGASTRIC PAIN: Status: ACTIVE | Noted: 2022-07-02

## 2022-07-02 LAB
A/G RATIO: 2 (ref 1.1–2.2)
ALBUMIN SERPL-MCNC: 4.7 G/DL (ref 3.4–5)
ALP BLD-CCNC: 92 U/L (ref 40–129)
ALT SERPL-CCNC: 7 U/L (ref 10–40)
AMORPHOUS: ABNORMAL /HPF
ANION GAP SERPL CALCULATED.3IONS-SCNC: 16 MMOL/L (ref 3–16)
ANION GAP SERPL CALCULATED.3IONS-SCNC: 24 MMOL/L (ref 3–16)
AST SERPL-CCNC: 15 U/L (ref 15–37)
BASOPHILS ABSOLUTE: 0 K/UL (ref 0–0.2)
BASOPHILS ABSOLUTE: 0 K/UL (ref 0–0.2)
BASOPHILS RELATIVE PERCENT: 0.5 %
BASOPHILS RELATIVE PERCENT: 0.6 %
BILIRUB SERPL-MCNC: 0.3 MG/DL (ref 0–1)
BILIRUBIN URINE: NEGATIVE
BLOOD, URINE: ABNORMAL
BUN BLDV-MCNC: 58 MG/DL (ref 7–20)
BUN BLDV-MCNC: 63 MG/DL (ref 7–20)
C-REACTIVE PROTEIN: 4 MG/L (ref 0–5.1)
CALCIUM SERPL-MCNC: 9.8 MG/DL (ref 8.3–10.6)
CALCIUM SERPL-MCNC: 9.9 MG/DL (ref 8.3–10.6)
CHLORIDE BLD-SCNC: 103 MMOL/L (ref 99–110)
CHLORIDE BLD-SCNC: 107 MMOL/L (ref 99–110)
CLARITY: CLEAR
CO2: 15 MMOL/L (ref 21–32)
CO2: 19 MMOL/L (ref 21–32)
COLOR: YELLOW
CREAT SERPL-MCNC: 3.9 MG/DL (ref 0.6–1.2)
CREAT SERPL-MCNC: 3.9 MG/DL (ref 0.6–1.2)
CREATININE URINE: 34.7 MG/DL (ref 28–259)
EOSINOPHILS ABSOLUTE: 0.1 K/UL (ref 0–0.6)
EOSINOPHILS ABSOLUTE: 0.1 K/UL (ref 0–0.6)
EOSINOPHILS RELATIVE PERCENT: 1.6 %
EOSINOPHILS RELATIVE PERCENT: 1.9 %
EPITHELIAL CELLS, UA: ABNORMAL /HPF (ref 0–5)
GFR AFRICAN AMERICAN: 14
GFR AFRICAN AMERICAN: 14
GFR NON-AFRICAN AMERICAN: 11
GFR NON-AFRICAN AMERICAN: 11
GLUCOSE BLD-MCNC: 103 MG/DL (ref 70–99)
GLUCOSE BLD-MCNC: 130 MG/DL (ref 70–99)
GLUCOSE URINE: NEGATIVE MG/DL
HCT VFR BLD CALC: 29.2 % (ref 36–48)
HCT VFR BLD CALC: 30.3 % (ref 36–48)
HEMOGLOBIN: 9.6 G/DL (ref 12–16)
HEMOGLOBIN: 9.7 G/DL (ref 12–16)
HYALINE CASTS: ABNORMAL /LPF (ref 0–2)
KETONES, URINE: NEGATIVE MG/DL
LEUKOCYTE ESTERASE, URINE: NEGATIVE
LYMPHOCYTES ABSOLUTE: 1.5 K/UL (ref 1–5.1)
LYMPHOCYTES ABSOLUTE: 1.6 K/UL (ref 1–5.1)
LYMPHOCYTES RELATIVE PERCENT: 27.7 %
LYMPHOCYTES RELATIVE PERCENT: 28.5 %
MCH RBC QN AUTO: 27.6 PG (ref 26–34)
MCH RBC QN AUTO: 28.3 PG (ref 26–34)
MCHC RBC AUTO-ENTMCNC: 32 G/DL (ref 31–36)
MCHC RBC AUTO-ENTMCNC: 32.8 G/DL (ref 31–36)
MCV RBC AUTO: 86.2 FL (ref 80–100)
MCV RBC AUTO: 86.3 FL (ref 80–100)
MICROSCOPIC EXAMINATION: YES
MONOCYTES ABSOLUTE: 0.6 K/UL (ref 0–1.3)
MONOCYTES ABSOLUTE: 0.7 K/UL (ref 0–1.3)
MONOCYTES RELATIVE PERCENT: 10.7 %
MONOCYTES RELATIVE PERCENT: 11.7 %
NEUTROPHILS ABSOLUTE: 3.2 K/UL (ref 1.7–7.7)
NEUTROPHILS ABSOLUTE: 3.3 K/UL (ref 1.7–7.7)
NEUTROPHILS RELATIVE PERCENT: 57.3 %
NEUTROPHILS RELATIVE PERCENT: 59.5 %
NITRITE, URINE: NEGATIVE
OSMOLALITY URINE: 265 MOSM/KG (ref 390–1070)
OSMOLALITY: 302 MOSM/KG (ref 278–305)
PDW BLD-RTO: 14.9 % (ref 12.4–15.4)
PDW BLD-RTO: 15.1 % (ref 12.4–15.4)
PH UA: 6 (ref 5–8)
PLATELET # BLD: 166 K/UL (ref 135–450)
PLATELET # BLD: 177 K/UL (ref 135–450)
PMV BLD AUTO: 7.6 FL (ref 5–10.5)
PMV BLD AUTO: 7.8 FL (ref 5–10.5)
POTASSIUM REFLEX MAGNESIUM: 4.3 MMOL/L (ref 3.5–5.1)
POTASSIUM SERPL-SCNC: 4.4 MMOL/L (ref 3.5–5.1)
PROTEIN UA: 30 MG/DL
RBC # BLD: 3.39 M/UL (ref 4–5.2)
RBC # BLD: 3.51 M/UL (ref 4–5.2)
RBC UA: ABNORMAL /HPF (ref 0–4)
SODIUM BLD-SCNC: 142 MMOL/L (ref 136–145)
SODIUM BLD-SCNC: 142 MMOL/L (ref 136–145)
SODIUM URINE: 34 MMOL/L
SODIUM URINE: 63 MMOL/L
SPECIFIC GRAVITY UA: 1.02 (ref 1–1.03)
TOTAL PROTEIN: 7.1 G/DL (ref 6.4–8.2)
TSH SERPL DL<=0.05 MIU/L-ACNC: 1.45 UIU/ML (ref 0.27–4.2)
URINE TYPE: ABNORMAL
UROBILINOGEN, URINE: 0.2 E.U./DL
WBC # BLD: 5.3 K/UL (ref 4–11)
WBC # BLD: 5.8 K/UL (ref 4–11)
WBC UA: ABNORMAL /HPF (ref 0–5)

## 2022-07-02 PROCEDURE — 74176 CT ABD & PELVIS W/O CONTRAST: CPT

## 2022-07-02 PROCEDURE — 93005 ELECTROCARDIOGRAM TRACING: CPT | Performed by: STUDENT IN AN ORGANIZED HEALTH CARE EDUCATION/TRAINING PROGRAM

## 2022-07-02 PROCEDURE — 84300 ASSAY OF URINE SODIUM: CPT

## 2022-07-02 PROCEDURE — 99223 1ST HOSP IP/OBS HIGH 75: CPT | Performed by: INTERNAL MEDICINE

## 2022-07-02 PROCEDURE — 96360 HYDRATION IV INFUSION INIT: CPT

## 2022-07-02 PROCEDURE — 6360000002 HC RX W HCPCS

## 2022-07-02 PROCEDURE — 2580000003 HC RX 258

## 2022-07-02 PROCEDURE — 1200000000 HC SEMI PRIVATE

## 2022-07-02 PROCEDURE — 82570 ASSAY OF URINE CREATININE: CPT

## 2022-07-02 PROCEDURE — C9113 INJ PANTOPRAZOLE SODIUM, VIA: HCPCS

## 2022-07-02 PROCEDURE — 2580000003 HC RX 258: Performed by: INTERNAL MEDICINE

## 2022-07-02 PROCEDURE — 83935 ASSAY OF URINE OSMOLALITY: CPT

## 2022-07-02 PROCEDURE — 36415 COLL VENOUS BLD VENIPUNCTURE: CPT

## 2022-07-02 PROCEDURE — 80048 BASIC METABOLIC PNL TOTAL CA: CPT

## 2022-07-02 PROCEDURE — 85025 COMPLETE CBC W/AUTO DIFF WBC: CPT

## 2022-07-02 PROCEDURE — 96361 HYDRATE IV INFUSION ADD-ON: CPT

## 2022-07-02 PROCEDURE — 2580000003 HC RX 258: Performed by: STUDENT IN AN ORGANIZED HEALTH CARE EDUCATION/TRAINING PROGRAM

## 2022-07-02 PROCEDURE — 81001 URINALYSIS AUTO W/SCOPE: CPT

## 2022-07-02 PROCEDURE — 83930 ASSAY OF BLOOD OSMOLALITY: CPT

## 2022-07-02 PROCEDURE — 99285 EMERGENCY DEPT VISIT HI MDM: CPT

## 2022-07-02 RX ORDER — SODIUM CHLORIDE 9 MG/ML
INJECTION, SOLUTION INTRAVENOUS CONTINUOUS
Status: DISCONTINUED | OUTPATIENT
Start: 2022-07-02 | End: 2022-07-02

## 2022-07-02 RX ORDER — POLYETHYLENE GLYCOL 3350 17 G/17G
17 POWDER, FOR SOLUTION ORAL DAILY PRN
Status: DISCONTINUED | OUTPATIENT
Start: 2022-07-02 | End: 2022-07-07 | Stop reason: HOSPADM

## 2022-07-02 RX ORDER — SODIUM CHLORIDE 9 MG/ML
INJECTION, SOLUTION INTRAVENOUS PRN
Status: DISCONTINUED | OUTPATIENT
Start: 2022-07-02 | End: 2022-07-07 | Stop reason: HOSPADM

## 2022-07-02 RX ORDER — ACETAMINOPHEN 325 MG/1
650 TABLET ORAL EVERY 6 HOURS PRN
Status: DISCONTINUED | OUTPATIENT
Start: 2022-07-02 | End: 2022-07-07 | Stop reason: HOSPADM

## 2022-07-02 RX ORDER — ONDANSETRON 2 MG/ML
4 INJECTION INTRAMUSCULAR; INTRAVENOUS EVERY 6 HOURS PRN
Status: DISCONTINUED | OUTPATIENT
Start: 2022-07-02 | End: 2022-07-07 | Stop reason: HOSPADM

## 2022-07-02 RX ORDER — SODIUM CHLORIDE 0.9 % (FLUSH) 0.9 %
5-40 SYRINGE (ML) INJECTION EVERY 12 HOURS SCHEDULED
Status: DISCONTINUED | OUTPATIENT
Start: 2022-07-02 | End: 2022-07-07 | Stop reason: HOSPADM

## 2022-07-02 RX ORDER — ACETAMINOPHEN 650 MG/1
650 SUPPOSITORY RECTAL EVERY 6 HOURS PRN
Status: DISCONTINUED | OUTPATIENT
Start: 2022-07-02 | End: 2022-07-07 | Stop reason: HOSPADM

## 2022-07-02 RX ORDER — ONDANSETRON 4 MG/1
4 TABLET, ORALLY DISINTEGRATING ORAL EVERY 8 HOURS PRN
Status: DISCONTINUED | OUTPATIENT
Start: 2022-07-02 | End: 2022-07-07 | Stop reason: HOSPADM

## 2022-07-02 RX ORDER — ATORVASTATIN CALCIUM 20 MG/1
20 TABLET, FILM COATED ORAL DAILY
Status: DISCONTINUED | OUTPATIENT
Start: 2022-07-10 | End: 2022-07-07 | Stop reason: HOSPADM

## 2022-07-02 RX ORDER — SODIUM CHLORIDE 0.9 % (FLUSH) 0.9 %
5-40 SYRINGE (ML) INJECTION PRN
Status: DISCONTINUED | OUTPATIENT
Start: 2022-07-02 | End: 2022-07-07 | Stop reason: HOSPADM

## 2022-07-02 RX ORDER — LABETALOL HYDROCHLORIDE 5 MG/ML
10 INJECTION, SOLUTION INTRAVENOUS EVERY 6 HOURS PRN
Status: DISCONTINUED | OUTPATIENT
Start: 2022-07-02 | End: 2022-07-03

## 2022-07-02 RX ORDER — SODIUM CHLORIDE, SODIUM LACTATE, POTASSIUM CHLORIDE, AND CALCIUM CHLORIDE .6; .31; .03; .02 G/100ML; G/100ML; G/100ML; G/100ML
1000 INJECTION, SOLUTION INTRAVENOUS ONCE
Status: COMPLETED | OUTPATIENT
Start: 2022-07-02 | End: 2022-07-02

## 2022-07-02 RX ORDER — LABETALOL 20 MG/4 ML (5 MG/ML) INTRAVENOUS SYRINGE
10 EVERY 4 HOURS PRN
Status: DISCONTINUED | OUTPATIENT
Start: 2022-07-02 | End: 2022-07-02

## 2022-07-02 RX ORDER — SODIUM CHLORIDE, SODIUM LACTATE, POTASSIUM CHLORIDE, CALCIUM CHLORIDE 600; 310; 30; 20 MG/100ML; MG/100ML; MG/100ML; MG/100ML
INJECTION, SOLUTION INTRAVENOUS CONTINUOUS
Status: DISCONTINUED | OUTPATIENT
Start: 2022-07-02 | End: 2022-07-04

## 2022-07-02 RX ORDER — LIDOCAINE 4 G/G
1 PATCH TOPICAL DAILY
Status: DISCONTINUED | OUTPATIENT
Start: 2022-07-02 | End: 2022-07-07 | Stop reason: HOSPADM

## 2022-07-02 RX ADMIN — PANTOPRAZOLE SODIUM 8 MG/HR: 40 INJECTION, POWDER, FOR SOLUTION INTRAVENOUS at 15:07

## 2022-07-02 RX ADMIN — SODIUM CHLORIDE, POTASSIUM CHLORIDE, SODIUM LACTATE AND CALCIUM CHLORIDE: 600; 310; 30; 20 INJECTION, SOLUTION INTRAVENOUS at 15:50

## 2022-07-02 RX ADMIN — ONDANSETRON 4 MG: 2 INJECTION INTRAMUSCULAR; INTRAVENOUS at 19:18

## 2022-07-02 RX ADMIN — SODIUM CHLORIDE: 9 INJECTION, SOLUTION INTRAVENOUS at 14:40

## 2022-07-02 RX ADMIN — SODIUM CHLORIDE, POTASSIUM CHLORIDE, SODIUM LACTATE AND CALCIUM CHLORIDE 1000 ML: 600; 310; 30; 20 INJECTION, SOLUTION INTRAVENOUS at 12:05

## 2022-07-02 RX ADMIN — PANTOPRAZOLE SODIUM 8 MG/HR: 40 INJECTION, POWDER, FOR SOLUTION INTRAVENOUS at 23:14

## 2022-07-02 ASSESSMENT — ENCOUNTER SYMPTOMS
SORE THROAT: 0
ABDOMINAL PAIN: 1
SHORTNESS OF BREATH: 0
COUGH: 0

## 2022-07-02 NOTE — PROGRESS NOTES
Nephrology Consult Note                                                                                                                                                                                                                                                                                                                                                               Office : [phone]     Fax :[phone]              Patient's Name: Augusto Meeks  3:30 PM  7/2/2022    Reason for Consult: THADDEUS   Requesting Physician:  Remi Higgins MD      Chief Complaint:  Abd pain     History of Present Ilness:    Augusto Meeks is a 68 y.o. female came with GI upset   Was taking NSAID's for back pain   Hb drop noted  Oral intake dec   BP elevated   Pt has ACIDOSIS   Cr bump sent to ER   No melena   No blood in stools   Her generalized symptoms of weakness and fatigue as well as abdominal discomfort and flank pain are somewhat better today.   Past Medical History:   Diagnosis Date    THADDEUS (acute kidney injury) ([facility] [address] 7/2/2022    Bilateral carotid artery stenosis 4/14/2022    Bilateral carotid artery stenosis 4/14/2022    Encounter for cervical Pap smear with pelvic exam *Oct. 11,2019    Negative     Hyperlipidemia     Osteoporosis DEXA - Sept., 2011    Lumbar T score -2.5 and frm neck -1.8    Screening mammogram for high-risk patient November 10, 2011    Negative    Screening mammogram, encounter for *March 8, 2017    Negative    Screening mammogram, encounter for *March 14, 2018    Negative    Screening mammogram, encounter for *March 20, 2019    Negative    Screening mammogram, encounter for *Leidy 15, 2020    Negative    Vitamin D deficiency     Wrist fracture, left Mar., 2016    Left       Past Surgical History:   Procedure Laterality Date    COLONOSCOPY  Dec., 2002 ( 2012 )    Dr. Jose Luis Garcia - normal.    COLONOSCOPY  Oct., 2013 ( 2023 )    Adriane Mora - Normal    CYSTOSCOPY  *May 14, 2020     Brianda Lobato - negative       Family History   Problem Relation Age of Onset    Hypertension Father 80        , hypertension, facial tumor.  Cancer Mother 80        , carcinoma of the lung.  Cancer Brother         prostate    Breast Cancer Daughter 39        reports that she has never smoked. She has never used smokeless tobacco. She reports current alcohol use. Allergies:  Patient has no known allergies.     Current Medications:    [START ON 7/10/2022] atorvastatin (LIPITOR) tablet 20 mg, Daily  pantoprazole (PROTONIX) 80 mg in sodium chloride 0.9 % 100 mL infusion, Continuous  0.9 % sodium chloride infusion, Continuous  sodium chloride flush 0.9 % injection 5-40 mL, 2 times per day  sodium chloride flush 0.9 % injection 5-40 mL, PRN  0.9 % sodium chloride infusion, PRN  ondansetron (ZOFRAN-ODT) disintegrating tablet 4 mg, Q8H PRN   Or  ondansetron (ZOFRAN) injection 4 mg, Q6H PRN  polyethylene glycol (GLYCOLAX) packet 17 g, Daily PRN  acetaminophen (TYLENOL) tablet 650 mg, Q6H PRN   Or  acetaminophen (TYLENOL) suppository 650 mg, Q6H PRN  labetalol (NORMODYNE;TRANDATE) injection syringe 10 mg, Q4H PRN        Review of Systems:   14 point ROS obtained but were negative except mentioned in HPI      Physical exam:     Vitals:  BP (!) 150/79   Pulse 79   Temp 97.7 °F (36.5 °C) (Oral)   Resp 16   Wt 121 lb 8 oz (55.1 kg)   SpO2 99%   BMI 23.73 kg/m²   Constitutional:  OAA X3 NAD  Skin: no rash, turgor wnl  Heent:  eomi, mmm  Neck: no bruits or jvd noted  Cardiovascular:  S1, S2 without m/r/g  Respiratory: CTA B without w/r/r  Abdomen:  +bs, soft, nt, nd  Ext: + lower extremity edema  Psychiatric: mood and affect appropriate  Musculoskeletal:  Rom, muscular strength intact    Data:   Labs:  CBC:   Recent Labs     22  1606 22  1205   WBC 5.3 5.8   HGB 9.6* 9.7*    166     BMP:    Recent Labs     22  1606 22  1205    142   K 4.4 4.3    107   CO2 15* 19*   BUN 63* 58*   CREATININE 3.9* 3.9*   GLUCOSE 103* 130*     Ca/Mg/Phos:   Recent Labs     07/01/22  1606 07/02/22  1205   CALCIUM 9.9 9.8     Hepatic:   Recent Labs     07/01/22  1606   AST 15   ALT 7*   BILITOT 0.3   ALKPHOS 92     Troponin: No results for input(s): TROPONINI in the last 72 hours. BNP: No results for input(s): BNP in the last 72 hours. Lipids: No results for input(s): CHOL, TRIG, HDL, LDLCALC, LABVLDL in the last 72 hours. ABGs: No results for input(s): PHART, PO2ART, SAQ5CBI in the last 72 hours. INR: No results for input(s): INR in the last 72 hours. UA:  Recent Labs     07/02/22  1304   COLORU Yellow   CLARITYU Clear   GLUCOSEU Negative   BILIRUBINUR Negative   KETUA Negative   SPECGRAV 1.025   BLOODU SMALL*   PHUR 6.0   PROTEINU 30*   UROBILINOGEN 0.2   NITRU Negative   LEUKOCYTESUR Negative   LABMICR YES   URINETYPE NotGiven      Urine Microscopic:   Recent Labs     07/02/22  1304   HYALCAST 0-2   WBCUA 3-5   RBCUA 3-4   EPIU 11-20*     Urine Culture: No results for input(s): LABURIN in the last 72 hours. Urine Chemistry:   Recent Labs     07/02/22  1304 07/02/22  1456   NAUR 34 63             IMAGING:  CT ABDOMEN PELVIS WO CONTRAST Additional Contrast? None   Final Result      1. No findings for acute intra-abdominal or pelvic abnormality to explain patient's symptoms. 2.  Hepatic cyst.      3.  Small hiatal hernia. Assessment/Plan   1. THADDEUS - NSAID use     2. HTN    3. Anemia    4. Acid- base/ Electrolyte imbalance     5.  Possible GIB     PLAN   - Ur studies  - stop NSAID's   - IVF   - No obstruction       Recommend to dose adjust all medications  based on renal functions  Maintain SBP> 90 mmHg   Daily weights   AVOID NSAIDs  Avoid Nephrotoxins  Monitor Intake/Output  Call if significant decrease in urine output                 Thank you for allowing us to participate in care of Helen Rodriguez MD  Feel free to contact me   [facility]  Office : [phone]  Fax :[phone]

## 2022-07-02 NOTE — H&P
Internal Medicine  PGY 1  History & Physical      CC: THADDEUS    History Obtained From:  patient, family member - daughter    HISTORY OF PRESENT ILLNESS:  Miss Karissa Chandler is a 67 y/o female who presents to the ED under guidance from her PCP because of an abnormal creatinine and persistent GI pain. She had been taking a baby aspirin daily for a couple of months and then started taking advil q4-6h for 2-3 days on an empty stomach to relieve her pain. She was feeling weak recently and decided to visit her PCP, who took her labs. ED Course: She states the abdominal pain is located in the epigastric region and is mild. It does not radiate and nothing improves it or makes it worse. She has not tried anything to make it better. She reports anorexia and worsening pain at night. She denies chest pain, f/c, coughing, SOB, urinary symptoms, head ache or N/V. She has no history of abdominal pain. On CT, there were no findings for acute intra-abdominal or pelvic abnormality to explain the symptoms. Only lab abnormalities were a creatinine of 3.9 (baseline 0.6) and Hgb of 9.6. VSS apart from blood pressure, which was up to 161/80.       Past Medical History:        Diagnosis Date    THADDEUS (acute kidney injury) ([facility] [address] 7/2/2022    Bilateral carotid artery stenosis 4/14/2022    Bilateral carotid artery stenosis 4/14/2022    Encounter for cervical Pap smear with pelvic exam *Oct. 11,2019    Negative     Hyperlipidemia     Osteoporosis DEXA - Sept., 2011    Lumbar T score -2.5 and frm neck -1.8    Screening mammogram for high-risk patient November 10, 2011    Negative    Screening mammogram, encounter for *March 8, 2017    Negative    Screening mammogram, encounter for *March 14, 2018    Negative    Screening mammogram, encounter for *March 20, 2019    Negative    Screening mammogram, encounter for *Leidy 15, 2020    Negative    Vitamin D deficiency     Wrist fracture, left Mar., 2016    Left   ·     Past Surgical History: Procedure Laterality Date    COLONOSCOPY  Dec., 2002 (  )    Dr. Jose Luis Garcia - normal.    COLONOSCOPY  Oct., 2013 (  )     - Normal    CYSTOSCOPY  *May 14, 2020    Dr. Josue Valenzuela - negative   ·     Medications Priorto Admission:    · Not in a hospital admission. Allergies:  Patient has no known allergies. Social History:   · TOBACCO:   reports that she has never smoked. She has never used smokeless tobacco.  · ETOH:   reports current alcohol use. · DRUGS : Unknown  · Patient currently lives     Family History:       Problem Relation Age of Onset    Hypertension Father 80        , hypertension, facial tumor.  Cancer Mother 80        , carcinoma of the lung.  Cancer Brother         prostate    Breast Cancer Daughter 39   ·     Review of Systems   Constitutional: Positive for appetite change and fatigue. Negative for chills and fever. HENT: Negative for sore throat. Respiratory: Negative for cough and shortness of breath. Cardiovascular: Negative for chest pain, palpitations and leg swelling. Gastrointestinal: Positive for abdominal pain. Genitourinary: Positive for flank pain. Negative for dysuria. Musculoskeletal: Back pain: B/L flank pain. Neurological: Negative for dizziness and headaches. ROS: A 10 point review of systems was conducted, significant findings as noted in HPI. Physical Exam  Cardiovascular:      Rate and Rhythm: Normal rate and regular rhythm. Pulses:           Radial pulses are 1+ on the right side and 1+ on the left side. Dorsalis pedis pulses are 2+ on the right side and 2+ on the left side. Heart sounds: Normal heart sounds. Pulmonary:      Effort: Pulmonary effort is normal. No respiratory distress. Breath sounds: Normal breath sounds. Abdominal:      General: Abdomen is flat. Palpations: Abdomen is soft. Tenderness: There is no abdominal tenderness.    Musculoskeletal:      Right lower leg: No edema. Left lower leg: No edema. Skin:     General: Skin is warm. Neurological:      General: No focal deficit present. Mental Status: She is alert and oriented to person, place, and time. Physical exam:       Vitals:    07/02/22 1400   BP:    Pulse: 99   Resp: 16   Temp:    SpO2: 100%       DATA:    Labs:  CBC:   Recent Labs     07/01/22  1606 07/02/22  1205   WBC 5.3 5.8   HGB 9.6* 9.7*   HCT 29.2* 30.3*    166       BMP:   Recent Labs     07/01/22  1606 07/02/22  1205    142   K 4.4 4.3    107   CO2 15* 19*   BUN 63* 58*   CREATININE 3.9* 3.9*   GLUCOSE 103* 130*     LFT's:   Recent Labs     07/01/22  1606   AST 15   ALT 7*   BILITOT 0.3   ALKPHOS 92     Troponin: No results for input(s): TROPONINI in the last 72 hours. BNP:No results for input(s): BNP in the last 72 hours. ABGs: No results for input(s): PHART, ZCL0IKP, PO2ART in the last 72 hours. INR: No results for input(s): INR in the last 72 hours. U/A:  Recent Labs     07/02/22  1304   COLORU Yellow   PHUR 6.0   WBCUA 3-5   RBCUA 3-4   CLARITYU Clear   SPECGRAV 1.025   LEUKOCYTESUR Negative   UROBILINOGEN 0.2   BILIRUBINUR Negative   BLOODU SMALL*   GLUCOSEU Negative   AMORPHOUS 3+       CT ABDOMEN PELVIS WO CONTRAST Additional Contrast? None   Final Result      1. No findings for acute intra-abdominal or pelvic abnormality to explain patient's symptoms. 2.  Hepatic cyst.      3.  Small hiatal hernia. ASSESSMENT AND PLAN:     Patient is a 67 y/o female who a PMHx of THADDEUS, HLD and Osteoporosis who presents to the ED under guidance from her PCP who saw abnormal labs. 1.Acute Kidney Injury  Creatinine of 3.9 from a baseline of 0.6. Given her recent use of NSAIDs, the damage could be caused by that. -  Urine studies (Urine creatinine, Urine Na, Urine osm and Seum Osm)  -  D/C NSAIDs  -  Consult Nephro    2. Anemia 2/2 to possible PUD  Hemoglobin of 9.7.  Month long use of NSAIDs and increase pain at night point to PUD as possible culprit  - Keep NPO  - NS  - Protonix Strip  - Hold NSAIDs  - Consult GI     3.  Hypertension  Blood pressure up to 160  - Labetolol PRN q4h if SBP above 170    Will discuss with attending physician Dr. Mary Gonzalez Status:Full code  FEN: NPO  PPX: SCD  DISPO: Darrion Paredes MD  7/2/2022,  2:19 PM

## 2022-07-02 NOTE — ED NOTES
Report given to KELLIE RN on floor. All questions answered. No concerns noted. Safe handoff complete. Pt is alert and oriented, resp even and unlabored, skin natural in color, no signs of acute distress.           Dorothy Delong RN  07/02/22 2800

## 2022-07-02 NOTE — ED PROVIDER NOTES
ED Attending Attestation Note     Date of evaluation: 7/2/2022    This patient was seen by the resident. I have seen and examined the patient, agree with the workup, evaluation, management and diagnosis. The care plan has been discussed. I have reviewed the ECG and concur with the resident's interpretation. My assessment reveals a 77-year-old female who presents with chief complaint of abnormal lab. Sent in by primary care doctor for creatinine 3.9 from baseline of 0.9. Patient states she feels good today. Benign abdomen. Overall well-appearing. Brianna Currie MD  07/02/22 0800

## 2022-07-02 NOTE — ED NOTES
EKG performed and given to provider. Blood drawn, collected and sent to lab per orders. Pt medicated according to orders. Pt identify verified using two patient identifiers. Allergies reviewed prior to medication administration. Pt resp even and unlabored, skin natural in color, no signs of acute distress. Please see MAR for additional information regarding administration.           Martin Casas RN  07/02/22 5135

## 2022-07-02 NOTE — ED PROVIDER NOTES
[id number] [facility] RESIDENT NOTE       Date of evaluation: 7/2/2022    Chief Complaint     Abnormal Lab      of Present Illness     Bereket Garcia is a 68 y.o. female who presents with several subacute complaints including GI upset and bilateral flank pain. She had been followed closely by her PCP for her symptoms and had been taking some over-the-counter pain relievers for her gastric distress as well as some type of acid suppressor. She had had some associated weakness with the symptoms and had therefore visited her PCP yesterday and obtained blood work which resulted with concern for declining renal function therefore the patient was contacted by her PCP and directed to the emergency department for evaluation. At the time arrival she is alert and conversant in no distress. She states that her generalized symptoms of weakness and fatigue as well as abdominal discomfort and flank pain are somewhat better today. During this period of illness she states that she has managed to take adequate nutrition hydration as well as had baseline GI/ function. Review of Systems     Constitutional: no fever, chills, unexpected weight change; + generalized weakness.   Skin: no rashes or lesions  HEENT: no head trauma, headache; no vision changes, eye pain; no nasal discharge or congestion; no ear pain or change in hearing; no neck or throat pain  Respiratory: no shortness of breath or cough  Cardiovascular: no chest pain, palpitations, peripheral edema  Gastrointestinal: no abdominal pain, no change in appetite, no nausea, vomiting, constipation, diarrhea, no blood in stools  Genitourinary: no changes in urination; no pain with urination  Musculoskeletal: no muscle or joint pains  Neuro: no syncope or seizure  Psych: no changes in mood or depression      Past Medical, Surgical, Family, and Social History     She has a past medical history of TAHDDEUS (acute kidney injury) [facility]), Bilateral carotid artery stenosis, Bilateral carotid artery stenosis, Encounter for cervical Pap smear with pelvic exam, Hyperlipidemia, Osteoporosis, Screening mammogram for high-risk patient, Screening mammogram, encounter for, Screening mammogram, encounter for, Screening mammogram, encounter for, Screening mammogram, encounter for, Vitamin D deficiency, and Wrist fracture, left. She has a past surgical history that includes Colonoscopy (Dec., 2002 ( 2012 )); Colonoscopy (Oct., 2013 ( 2023 )); and Cystocopy (*May 14, 2020). Her family history includes Breast Cancer (age of onset: 39) in her daughter; Cancer in her brother; Cancer (age of onset: 80) in her mother; Hypertension (age of onset: 80) in her father. She reports that she has never smoked. She has never used smokeless tobacco. She reports current alcohol use. Medications     Previous Medications    ASPIRIN 81 MG EC TABLET    Take 81 mg by mouth daily    ATORVASTATIN (LIPITOR) 20 MG TABLET    TAKE ONE TABLET BY MOUTH DAILY    CALCIUM CARBONATE-VITAMIN D (CALCIUM + D) 600-200 MG-UNIT TABS    Take  by mouth 2 times daily. PANTOPRAZOLE (PROTONIX) 40 MG TABLET    Take 1 tablet by mouth daily (with breakfast)       Allergies     She has No Known Allergies.     Physical Exam     INITIAL VITALS: BP: (!) 146/76, Temp: 98.1 °F (36.7 °C), Heart Rate: 97, Resp: 16, SpO2: 100 %     General: alert and conversant in no distress  Skin: warm, dry and pink without noted rashes or lesions  Head: normocephalic atraumatic  Eyes: Pupils equal, extra ocular movements grossly intact  Mouth / Pharynx: moist mucus membranes without erythema or lesions noted  Neck: full range of motion without meningismus  Chest: no external findings or tenderness; clear to auscultation  Heart: regular normal S1 and S2 without murmur  noted  Abdomen: negative external findings, + bowel sounds, soft and non tender; no rebound; non distended  Extremities: well perfused with palpable 8.0    Protein, UA 30 (A) Negative mg/dL    Urobilinogen, Urine 0.2 <2.0 E.U./dL    Nitrite, Urine Negative Negative    Leukocyte Esterase, Urine Negative Negative    Microscopic Examination YES    Sodium, urine, random   Result Value Ref Range    Sodium, Ur 34 Not Established mmol/L       ED BEDSIDE ULTRASOUND:  na    RECENT VITALS:  BP: 134/79, Temp: 98.1 °F (36.7 °C), Heart Rate: 69,Resp: 16, SpO2: 100 %     Procedures     na    ED Course     Nursing Notes, Past Medical Hx, Past Surgical Hx, Social Hx, Allergies, and Family Hx were reviewed. The patient was given the followingmedications:  Orders Placed This Encounter   Medications    lactated ringers bolus       CONSULTS:  Owen Davis / MIKAELA / Speedy Brian is a 68 y.o. female who presents upon the recommendation of her PCP with concern for declining renal function. She has had days to weeks of generalized weakness and fatigue with some abdominal pain and flank pain. She had attempted to manage her abdominal pain with a PPI. She denies that she has had any hematemesis melena or hematochezia. Her labs obtained yesterday demonstrate an elevated creatinine to 3.9 with a GFR of 14 down from normal renal function as of April of this year. The precise etiology of her acute renal failure is unclear. She did have a normal potassium and does not clearly present with any high risk features indicating the need for emergent dialysis. She was also noted to have a drop in her hemoglobin of unclear etiology. She denies any bleeding and her clinical exam is reassuring against any occult bleeding particular in the GI tract. Broad work-up is sent and she will also benefit from CT imaging and likely require admission to further characterize her condition.   Remainder of her ED course as follows:     ED Course as of 07/02/22 1355   Sat Jul 02, 2022   1354 Patient is reassessed she remains hemodynamically stable and in no distress. She is given IV fluids. Her labs resulted and continue to demonstrate a acute kidney injury with an elevated creatinine as well as again a relative drop in her hemoglobin. She does not require transfusion. A noncontrast abdominal CT is obtained and is without actionable abnormality. The precise etiology of her acute kidney injury is unclear however she will benefit from admission for further evaluation. Her case discussed with the admitting team and this concludes her ED course. [NG]      ED Course User Index  [NG] Bobbi Mccormick MD         This patient was also evaluated by the attending physician. All care plans werediscussed and agreed upon. Clinical Impression     1. THADDEUS (acute kidney injury) ([facility] [address]        Disposition     PATIENT REFERRED TO:  No follow-up provider specified.     DISCHARGE MEDICATIONS:  New Prescriptions    No medications on file       DISPOSITION Admitted 07/02/2022 01:42:22 PM       Bobbi Mccormick MD  Resident  07/02/22 4305

## 2022-07-02 NOTE — PROGRESS NOTES
4 Eyes Admission Assessment     I agree as the admission nurse that 2 RN's have performed a thorough Head to Toe Skin Assessment on the patient. ALL assessment sites listed below have been assessed on admission. Areas assessed by both nurses:  [x]   Head, Face, and Ears   [x]   Shoulders, Back, and Chest  [x]   Arms, Elbows, and Hands   [x]   Coccyx, Sacrum, and Ischium  [x]   Legs, Feet, and Heels        Does the Patient have Skin Breakdown?   No         Abdelrahman Prevention initiated:  No   Wound Care Orders initiated:  No      [facility] nurse consulted for Pressure Injury (Stage 3,4, Unstageable, DTI, NWPT, and Complex wounds) or Abdelrahman score 18 or lower:  No      Nurse 1 eSignature: Electronically signed by Moira Lopez RN on 7/2/22 at 2:47 PM EDT    **SHARE this note so that the co-signing nurse is able to place an eSignature**    Nurse 2 eSignature: Electronically signed by Angella Blancas RN on 7/2/22 at 3:23 PM EDT

## 2022-07-03 LAB
ANION GAP SERPL CALCULATED.3IONS-SCNC: 16 MMOL/L (ref 3–16)
BANDED NEUTROPHILS RELATIVE PERCENT: 1 % (ref 0–7)
BASOPHILS ABSOLUTE: 0 K/UL (ref 0–0.2)
BASOPHILS RELATIVE PERCENT: 0 %
BUN BLDV-MCNC: 47 MG/DL (ref 7–20)
CALCIUM SERPL-MCNC: 9.4 MG/DL (ref 8.3–10.6)
CHLORIDE BLD-SCNC: 112 MMOL/L (ref 99–110)
CO2: 16 MMOL/L (ref 21–32)
CREAT SERPL-MCNC: 3.8 MG/DL (ref 0.6–1.2)
CREATININE URINE: 45.1 MG/DL (ref 28–259)
EKG ATRIAL RATE: 92 BPM
EKG DIAGNOSIS: NORMAL
EKG P-R INTERVAL: 104 MS
EKG Q-T INTERVAL: 330 MS
EKG QRS DURATION: 82 MS
EKG QTC CALCULATION (BAZETT): 408 MS
EKG R AXIS: 28 DEGREES
EKG T AXIS: 22 DEGREES
EKG VENTRICULAR RATE: 92 BPM
EOSINOPHILS ABSOLUTE: 0.2 K/UL (ref 0–0.6)
EOSINOPHILS RELATIVE PERCENT: 4 %
GFR AFRICAN AMERICAN: 14
GFR NON-AFRICAN AMERICAN: 12
GLUCOSE BLD-MCNC: 88 MG/DL (ref 70–99)
HCT VFR BLD CALC: 24.4 % (ref 36–48)
HCT VFR BLD CALC: 25.8 % (ref 36–48)
HEMOGLOBIN: 8.2 G/DL (ref 12–16)
HEMOGLOBIN: 8.6 G/DL (ref 12–16)
LYMPHOCYTES ABSOLUTE: 0.8 K/UL (ref 1–5.1)
LYMPHOCYTES RELATIVE PERCENT: 20 %
MAGNESIUM: 1.9 MG/DL (ref 1.8–2.4)
MCH RBC QN AUTO: 28.4 PG (ref 26–34)
MCHC RBC AUTO-ENTMCNC: 33.5 G/DL (ref 31–36)
MCV RBC AUTO: 84.8 FL (ref 80–100)
MONOCYTES ABSOLUTE: 0.2 K/UL (ref 0–1.3)
MONOCYTES RELATIVE PERCENT: 4 %
NEUTROPHILS ABSOLUTE: 3 K/UL (ref 1.7–7.7)
NEUTROPHILS RELATIVE PERCENT: 71 %
PDW BLD-RTO: 14.7 % (ref 12.4–15.4)
PLATELET # BLD: 112 K/UL (ref 135–450)
PMV BLD AUTO: 7.7 FL (ref 5–10.5)
POTASSIUM REFLEX MAGNESIUM: 4.3 MMOL/L (ref 3.5–5.1)
PROTEIN PROTEIN: 200 MG/DL
PROTEIN/CREAT RATIO: 4.4 MG/DL
RBC # BLD: 2.88 M/UL (ref 4–5.2)
SODIUM BLD-SCNC: 144 MMOL/L (ref 136–145)
WBC # BLD: 4.2 K/UL (ref 4–11)

## 2022-07-03 PROCEDURE — 84156 ASSAY OF PROTEIN URINE: CPT

## 2022-07-03 PROCEDURE — 2580000003 HC RX 258: Performed by: INTERNAL MEDICINE

## 2022-07-03 PROCEDURE — 82570 ASSAY OF URINE CREATININE: CPT

## 2022-07-03 PROCEDURE — 99233 SBSQ HOSP IP/OBS HIGH 50: CPT | Performed by: INTERNAL MEDICINE

## 2022-07-03 PROCEDURE — 85018 HEMOGLOBIN: CPT

## 2022-07-03 PROCEDURE — 36415 COLL VENOUS BLD VENIPUNCTURE: CPT

## 2022-07-03 PROCEDURE — 1200000000 HC SEMI PRIVATE

## 2022-07-03 PROCEDURE — 6360000002 HC RX W HCPCS

## 2022-07-03 PROCEDURE — 85014 HEMATOCRIT: CPT

## 2022-07-03 PROCEDURE — 85025 COMPLETE CBC W/AUTO DIFF WBC: CPT

## 2022-07-03 PROCEDURE — 83735 ASSAY OF MAGNESIUM: CPT

## 2022-07-03 PROCEDURE — 2580000003 HC RX 258

## 2022-07-03 PROCEDURE — 80048 BASIC METABOLIC PNL TOTAL CA: CPT

## 2022-07-03 PROCEDURE — C9113 INJ PANTOPRAZOLE SODIUM, VIA: HCPCS

## 2022-07-03 RX ORDER — LABETALOL HYDROCHLORIDE 5 MG/ML
10 INJECTION, SOLUTION INTRAVENOUS EVERY 6 HOURS PRN
Status: DISCONTINUED | OUTPATIENT
Start: 2022-07-03 | End: 2022-07-07 | Stop reason: HOSPADM

## 2022-07-03 RX ORDER — LANOLIN ALCOHOL/MO/W.PET/CERES
6 CREAM (GRAM) TOPICAL NIGHTLY PRN
Status: DISCONTINUED | OUTPATIENT
Start: 2022-07-03 | End: 2022-07-07 | Stop reason: HOSPADM

## 2022-07-03 RX ADMIN — ONDANSETRON 4 MG: 2 INJECTION INTRAMUSCULAR; INTRAVENOUS at 20:32

## 2022-07-03 RX ADMIN — PANTOPRAZOLE SODIUM 8 MG/HR: 40 INJECTION, POWDER, FOR SOLUTION INTRAVENOUS at 21:52

## 2022-07-03 RX ADMIN — PANTOPRAZOLE SODIUM 8 MG/HR: 40 INJECTION, POWDER, FOR SOLUTION INTRAVENOUS at 11:16

## 2022-07-03 RX ADMIN — SODIUM CHLORIDE, POTASSIUM CHLORIDE, SODIUM LACTATE AND CALCIUM CHLORIDE: 600; 310; 30; 20 INJECTION, SOLUTION INTRAVENOUS at 18:25

## 2022-07-03 ASSESSMENT — ENCOUNTER SYMPTOMS
ABDOMINAL PAIN: 1
DIARRHEA: 0
ABDOMINAL DISTENTION: 0
SHORTNESS OF BREATH: 0
CONSTIPATION: 0
VOMITING: 0
BLOOD IN STOOL: 0
COUGH: 0
NAUSEA: 1

## 2022-07-03 NOTE — PROGRESS NOTES
Patient is A&Ox4, VSS. Patient voiding freely clear/yellow urine. Patient has denied pain during shift. IVF and Protonix gtt infusing without complications. Pt. Complained of nausea at start of shift - prn zofran given with benefit. Patient also complaining about no being able to sleep due to pump noise and beeps - ear plugs given and towels placed on pump to help. Bed in lowest position and locked. Call light and table within reach.

## 2022-07-03 NOTE — PLAN OF CARE
Problem: Discharge Planning  Goal: Discharge to home or other facility with appropriate resources  Outcome: Progressing     Problem: ABCDS Injury Assessment  Goal: Absence of physical injury  Outcome: Progressing  Flowsheets (Taken 7/3/2022 1302)  Absence of Physical Injury: Implement safety measures based on patient assessment

## 2022-07-03 NOTE — PROGRESS NOTES
Patient A&Ox4. VSS. Patient is up ad april. Protonix drip infusing per MD order. Voiding freely in the restroom, clear yellow urine. Patient is to be NPO at midnight per Md order, anticipating EGD tomorrow. Patient is resting in bed and has no other needs at this time.     Electronically signed by Patricia Moss RN on 7/3/2022 at 6:34 PM

## 2022-07-03 NOTE — PROGRESS NOTES
Progress Note    Admit Date: 7/2/2022  Day: 2  Diet: liquid diet    CC: abdominal pain    Interval history:   No acute events. Afebrile, VSS. Patient had some nausea resolved with zofran. She had some dark appearing stool few days ago, last BM yesterday. Abdominal discomfort improving. Is additionally complaining of some ear aching bilaterally, was taking ear oil per outpatient doctor with minimal improvement in symptoms. No feeling of aural fullness/presence of discharge. Denies fever, chills, chest pain, SOB, vomiting, constipation, diarrhea. Endorses abdominal pain, nausea,  1.7 L UOP today, 700 mL yesterday  Hgb low at 8.2. HPI:   Miss Nina Benitez is a 67 y/o female who presents to the ED under guidance from her PCP because of an abnormal creatinine and persistent GI pain. She had been taking a baby aspirin daily for a couple of months and then started taking advil q4-6h for 2-3 days on an empty stomach to relieve her pain. She was feeling weak recently and decided to visit her PCP, who took her labs.      ED Course: She states the abdominal pain is located in the epigastric region and is mild. It does not radiate and nothing improves it or makes it worse. She has not tried anything to make it better. She reports anorexia and worsening pain at night. She denies chest pain, f/c, coughing, SOB, urinary symptoms, head ache or N/V. She has no history of abdominal pain.      On CT, there were no findings for acute intra-abdominal or pelvic abnormality to explain the symptoms. Only lab abnormalities were a creatinine of 3.9 (baseline 0.6) and Hgb of 9.6. VSS apart from blood pressure, which was up to 161/80.     Medications:     Scheduled Meds:   [START ON 7/10/2022] atorvastatin  20 mg Oral Daily    sodium chloride flush  5-40 mL IntraVENous 2 times per day    lidocaine  1 patch TransDERmal Daily     Continuous Infusions:   pantoprozole (PROTONIX) infusion 8 mg/hr (07/02/22 2877)    sodium chloride      lactated ringers 75 mL/hr at 07/02/22 1550     PRN Meds:sodium chloride flush, sodium chloride, ondansetron **OR** ondansetron, polyethylene glycol, acetaminophen **OR** acetaminophen, labetalol    Objective:   Vitals:   T-max:  Patient Vitals for the past 8 hrs:   BP Temp Temp src Pulse Resp SpO2   07/03/22 0757 134/75 97.8 °F (36.6 °C) Oral 79 16 97 %   07/03/22 0415 129/68 98.2 °F (36.8 °C) Oral 75 16 92 %       Intake/Output Summary (Last 24 hours) at 7/3/2022 0831  Last data filed at 7/3/2022 0415  Gross per 24 hour   Intake 0 ml   Output 1400 ml   Net -1400 ml       Review of Systems   HENT: Positive for ear pain. Negative for ear discharge, hearing loss and tinnitus. Respiratory: Negative for cough and shortness of breath. Cardiovascular: Negative for chest pain, palpitations and leg swelling. Gastrointestinal: Positive for abdominal pain and nausea. Negative for abdominal distention, blood in stool, constipation, diarrhea and vomiting. Physical Exam  Constitutional:       General: She is not in acute distress. Appearance: Normal appearance. HENT:      Head: Normocephalic and atraumatic. Right Ear: Ear canal and external ear normal.      Left Ear: Ear canal and external ear normal.   Cardiovascular:      Rate and Rhythm: Normal rate and regular rhythm. Heart sounds: Murmur heard. Pulmonary:      Effort: Pulmonary effort is normal.      Breath sounds: Normal breath sounds. No wheezing or rales. Abdominal:      General: Abdomen is flat. Bowel sounds are normal. There is no distension. Palpations: Abdomen is soft. Tenderness: There is no abdominal tenderness. Musculoskeletal:      Right lower leg: No edema. Left lower leg: No edema. Neurological:      Mental Status: She is alert.          LABS:    CBC:   Recent Labs     07/01/22  1606 07/02/22  1205 07/03/22  0546   WBC 5.3 5.8 4.2   HGB 9.6* 9.7* 8.2*   HCT 29.2* 30.3* 24.4*    166 112*   MCV 86.2 86.3 84.8     Renal:    Recent Labs     07/01/22  1606 07/02/22  1205 07/03/22  0546    142 144   K 4.4 4.3  --     107 112*   CO2 15* 19* 16*   BUN 63* 58* 47*   CREATININE 3.9* 3.9* 3.8*   GLUCOSE 103* 130* 88   CALCIUM 9.9 9.8 9.4   MG  --   --  1.90   ANIONGAP 24* 16 16     Hepatic:   Recent Labs     07/01/22  1606   AST 15   ALT 7*   BILITOT 0.3   PROT 7.1   LABALBU 4.7   ALKPHOS 92     Troponin: No results for input(s): TROPONINI in the last 72 hours. BNP: No results for input(s): BNP in the last 72 hours. Lipids: No results for input(s): CHOL, HDL in the last 72 hours. Invalid input(s): LDLCALCU, TRIGLYCERIDE  ABGs:  No results for input(s): PHART, SXE3IYD, PO2ART, QRN5JEV, BEART, THGBART, M6FHWAGF, XPM5HHG in the last 72 hours. INR: No results for input(s): INR in the last 72 hours. Lactate: No results for input(s): LACTATE in the last 72 hours. Cultures:  -----------------------------------------------------------------  RAD:   CT ABDOMEN PELVIS WO CONTRAST Additional Contrast? None   Final Result      1. No findings for acute intra-abdominal or pelvic abnormality to explain patient's symptoms. 2.  Hepatic cyst.      3.  Small hiatal hernia. Assessment/Plan:        Patient is a 67 y/o female who a PMHx of THADDEUS, HLD and Osteoporosis who presents to the ED under guidance from her PCP who saw abnormal labs.     Acute Kidney Injury  Creatinine of 3.9 from a baseline of 0.6. 3.8 this am.  -  Urine studies - indicative of possible postrenal etiology but patient had been receiving IVF  - trend Cr, UOP  - bladder scan qshift  -  avoid nephrotoxins  -  nephrology consult    Metabolic acidosis  Patient with bicarbonate of 15 on admission. Increased to 19 then reduced to 16 this am. Initially had anion gap up to 24 which has now resolved. May be due to acute renal failure.  Was previously receiving NS infusion.  -continue monitor  -switched to LR      Anemia 2/2 to possible PUD  Hemoglobin of 9.7 from baseline ~12, down to 8.2 this am. May be partially dilutional due to IVF. Month long use of NSAIDs and increase pain at night point to PUD as possible culprit  - Keep NPO  - H/H q12, transfuse if below 7  - LR @ 75 cc/hr  - Protonix infusion  - Hold NSAIDs  - Consult GI     Hypertension  Blood pressure up to 160  - Labetolol PRN q4h if SBP above 180    Thromboyctopenia  Platelets 963.  Currently no indication for transfusion  -continue monitor     Will discuss with attending physician Dr. Thompson Lay    Code Status: Full  FEN: liquid diet  PPX: SCD  DISPO: Samir Edwards MD, PGY-2  07/03/22  8:31 AM    This patient has been staffed and discussed with Thad Ferreira MD.

## 2022-07-03 NOTE — PROGRESS NOTES
Nephrology Consult Note                                                                                                                                                                                                                                                                                                                                                               Office : [phone]     Fax :[phone]              Patient's Name: Sharyn Kulkarni      22    Cr stable   Has Acidosis   Ur studies pending        Past Medical History:   Diagnosis Date    THADDEUS (acute kidney injury) ([facility] [address] 2022    Bilateral carotid artery stenosis 2022    Bilateral carotid artery stenosis 2022    Encounter for cervical Pap smear with pelvic exam *Oct. 11,2019    Negative     Hyperlipidemia     Osteoporosis DEXA - 2011    Lumbar T score -2.5 and frm neck -1.8    Screening mammogram for high-risk patient November 10, 2011    Negative    Screening mammogram, encounter for *2017    Negative    Screening mammogram, encounter for *2018    Negative    Screening mammogram, encounter for *2019    Negative    Screening mammogram, encounter for *Leidy 15, 2020    Negative    Vitamin D deficiency     Wrist fracture, left Mar., 2016    Left       Past Surgical History:   Procedure Laterality Date    COLONOSCOPY  Dec., 2002 (  )    Dr. Jack Castillo - normal.    COLONOSCOPY  Oct., 2013 (  )    Ariana Lau - Normal    CYSTOSCOPY  *May 14, 2020    Dr. Ileana Swanson - negative       Family History   Problem Relation Age of Onset    Hypertension Father 80        , hypertension, facial tumor.  Cancer Mother 80        , carcinoma of the lung.  Cancer Brother         prostate    Breast Cancer Daughter 39        reports that she has never smoked. She has never used smokeless tobacco. She reports current alcohol use.     Allergies:  Patient has no known allergies.     Current Medications:    labetalol (NORMODYNE;TRANDATE) injection 10 mg, Q6H PRN  [START ON 7/10/2022] atorvastatin (LIPITOR) tablet 20 mg, Daily  pantoprazole (PROTONIX) 80 mg in sodium chloride 0.9 % 100 mL infusion, Continuous  sodium chloride flush 0.9 % injection 5-40 mL, 2 times per day  sodium chloride flush 0.9 % injection 5-40 mL, PRN  0.9 % sodium chloride infusion, PRN  ondansetron (ZOFRAN-ODT) disintegrating tablet 4 mg, Q8H PRN   Or  ondansetron (ZOFRAN) injection 4 mg, Q6H PRN  polyethylene glycol (GLYCOLAX) packet 17 g, Daily PRN  acetaminophen (TYLENOL) tablet 650 mg, Q6H PRN   Or  acetaminophen (TYLENOL) suppository 650 mg, Q6H PRN  lactated ringers infusion, Continuous  lidocaine 4 % external patch 1 patch, Daily        Review of Systems:   14 point ROS obtained but were negative except mentioned in HPI      Physical exam:     Vitals:  /68   Pulse 76   Temp 98.8 °F (37.1 °C) (Oral)   Resp 16   Wt 121 lb 8 oz (55.1 kg)   SpO2 96%   BMI 23.73 kg/m²   Constitutional:  OAA X3 NAD  Skin: no rash, turgor wnl  Heent:  eomi, mmm  Neck: no bruits or jvd noted  Cardiovascular:  S1, S2 without m/r/g  Respiratory: CTA B without w/r/r  Abdomen:  +bs, soft, nt, nd  Ext: + lower extremity edema  Psychiatric: mood and affect appropriate  Musculoskeletal:  Rom, muscular strength intact    Data:   Labs:  CBC:   Recent Labs     07/01/22  1606 07/02/22  1205 07/03/22  0546   WBC 5.3 5.8 4.2   HGB 9.6* 9.7* 8.2*    166 112*     BMP:    Recent Labs     07/01/22  1606 07/02/22  1205 07/03/22  0546    142 144   K 4.4 4.3  --     107 112*   CO2 15* 19* 16*   BUN 63* 58* 47*   CREATININE 3.9* 3.9* 3.8*   GLUCOSE 103* 130* 88     Ca/Mg/Phos:   Recent Labs     07/01/22  1606 07/02/22  1205 07/03/22  0546   CALCIUM 9.9 9.8 9.4   MG  --   --  1.90     Hepatic:   Recent Labs     07/01/22  1606   AST 15   ALT 7*   BILITOT 0.3   ALKPHOS 92     Troponin: No results for input(s): TROPONINI in the last 72 hours. BNP: No results for input(s): BNP in the last 72 hours. Lipids: No results for input(s): CHOL, TRIG, HDL, LDLCALC, LABVLDL in the last 72 hours. ABGs: No results for input(s): PHART, PO2ART, CLF0ULT in the last 72 hours. INR: No results for input(s): INR in the last 72 hours. UA:  Recent Labs     07/02/22  1304   COLORU Yellow   CLARITYU Clear   GLUCOSEU Negative   BILIRUBINUR Negative   KETUA Negative   SPECGRAV 1.025   BLOODU SMALL*   PHUR 6.0   PROTEINU 30*   UROBILINOGEN 0.2   NITRU Negative   LEUKOCYTESUR Negative   LABMICR YES   URINETYPE NotGiven      Urine Microscopic:   Recent Labs     07/02/22  1304   HYALCAST 0-2   WBCUA 3-5   RBCUA 3-4   EPIU 11-20*     Urine Culture: No results for input(s): LABURIN in the last 72 hours. Urine Chemistry:   Recent Labs     07/02/22  1304 07/02/22  1456   LABCREA  --  34.7   NAUR 34 63             IMAGING:  CT ABDOMEN PELVIS WO CONTRAST Additional Contrast? None   Final Result      1. No findings for acute intra-abdominal or pelvic abnormality to explain patient's symptoms. 2.  Hepatic cyst.      3.  Small hiatal hernia. Assessment/Plan   1. THADDEUS - NSAID use     2. HTN    3. Anemia    4. Acid- base/ Electrolyte imbalance     5.  Possible GIB     PLAN   - Ur studies - pending   - stop NSAID's   - dec IVF   - No obstruction       Recommend to dose adjust all medications  based on renal functions  Maintain SBP> 90 mmHg   Daily weights   AVOID NSAIDs  Avoid Nephrotoxins  Monitor Intake/Output  Call if significant decrease in urine output                 Thank you for allowing us to participate in care of Boyd Rosado MD  Feel free to contact me   [facility] [address]  Office : [phone]  Fax :[phone]

## 2022-07-03 NOTE — CONSULTS
[facility] / [facility] [place]  Consultation Note        Subjective:       Patient is a 68 y.o. [address] [facility] female admitted 7/2/2022 with THADDEUS (acute kidney injury) ([facility] [address] [N17.9] who is seen in consult for abdominal pain. No signfiicant past medical history. Patient admitted for epigastric abdominal pain and THADDEUS. +ibuprofen 400mg q4-6 hours for several days to treat back pain, then developed epigastric abdominal pain. Labs by primary MD revealed anemia and THADDEUS. No overt GI bleeding.  +fatigue, weakness, HAMILTON, nausea. Denies fever, chills, chest pain, dysphagia, odynophagia, vomiting, diarrhea, constipation, melena, brbpr, hematochezia, hematemesis, weight loss, jaundice. No prior EGD. Colonoscopy 1-2 years ago with polyps. Past Endoscopic History  As above      Imaging Studies:                  Ultrasound:  CT-scan of abdomen and pelvis:  1.  No findings for acute intra-abdominal or pelvic abnormality to explain patient's symptoms.       2.  Hepatic cyst.       3.  Small hiatal hernia. Assessment:     Principal Problem:    THADDEUS (acute kidney injury) ([facility] [address]  Active Problems:    Epigastric pain    Anemia    Nephropathy due to nonsteroidal anti-inflammatory drug (NSAID)  Resolved Problems:    * No resolved hospital problems. *    Abdominal pain - suspect NSAID induced peptic ulcer disease. Differential of her abdominal pain also includes gastritis, pill esophagitis, malignancy, or other. No evidence of gallstones or biliary etiology by labs or imaging.       Recommendations:   - Protonix IV BID  - educated patient to avoid NSAID medications for pain control, consider Tylenol as needed  - Monitor Hgb and transfuse as needed per primary service   - plan for EGD on Tuesday for further evaluation, NPO at MN Monday  - advance diet as tolerated      Manuela Albarado MD, MD  [facility] / [facility] and [address] [address]  [address] [address] lung.     Cancer Brother         prostate    Breast Cancer Daughter 39        Review of Systems  Pertinent items are noted in HPI. Objective:     Blood pressure 125/68, pulse 76, temperature 98.8 °F (37.1 °C), temperature source Oral, resp. rate 16, weight 121 lb 8 oz (55.1 kg), SpO2 96 %, currently breastfeeding. General appearance: alert, cooperative, no distress  Head: Normocephalic, without obvious abnormality, anicteric  Lungs: clear to auscultation bilaterally  Heart: regular rate and rhythm  Abdomen: soft, mild epigastric ttp, No rebound/guarding, Bowel sounds normal. No palpable masses. Extremities: extremities normal, atraumatic, no cyanosis or edema  Skin: warm and dry, no jaundice, no pallor  Neuro: intact    Data Review:    Recent Labs     07/01/22  1606 07/02/22  1205 07/03/22  0546   WBC 5.3 5.8 4.2   HGB 9.6* 9.7* 8.2*   HCT 29.2* 30.3* 24.4*   MCV 86.2 86.3 84.8    166 112*     Recent Labs     07/01/22  1606 07/02/22  1205 07/03/22  0546    142 144   K 4.4 4.3 4.3    107 112*   CO2 15* 19* 16*   BUN 63* 58* 47*   CREATININE 3.9* 3.9* 3.8*     Recent Labs     07/01/22  1606   AST 15   ALT 7*   BILITOT 0.3   ALKPHOS 92     No results for input(s): LIPASE, AMYLASE in the last 72 hours. Recent Labs     07/01/22  1606   PROT 7.1     No results for input(s): PTT in the last 72 hours. No results for input(s): OCCULTBLD in the last 72 hours.

## 2022-07-04 LAB
ANION GAP SERPL CALCULATED.3IONS-SCNC: 7 MMOL/L (ref 3–16)
BASOPHILS ABSOLUTE: 0 K/UL (ref 0–0.2)
BASOPHILS RELATIVE PERCENT: 0.7 %
BUN BLDV-MCNC: 38 MG/DL (ref 7–20)
CALCIUM SERPL-MCNC: 9.6 MG/DL (ref 8.3–10.6)
CHLORIDE BLD-SCNC: 108 MMOL/L (ref 99–110)
CO2: 23 MMOL/L (ref 21–32)
CREAT SERPL-MCNC: 3.9 MG/DL (ref 0.6–1.2)
EOSINOPHILS ABSOLUTE: 0.1 K/UL (ref 0–0.6)
EOSINOPHILS RELATIVE PERCENT: 2.9 %
GFR AFRICAN AMERICAN: 14
GFR NON-AFRICAN AMERICAN: 11
GLUCOSE BLD-MCNC: 91 MG/DL (ref 70–99)
HCT VFR BLD CALC: 24.1 % (ref 36–48)
HCT VFR BLD CALC: 28.7 % (ref 36–48)
HEMOGLOBIN: 7.7 G/DL (ref 12–16)
HEMOGLOBIN: 9.2 G/DL (ref 12–16)
LYMPHOCYTES ABSOLUTE: 0.8 K/UL (ref 1–5.1)
LYMPHOCYTES RELATIVE PERCENT: 20.4 %
MAGNESIUM: 1.8 MG/DL (ref 1.8–2.4)
MCH RBC QN AUTO: 27.6 PG (ref 26–34)
MCHC RBC AUTO-ENTMCNC: 32.1 G/DL (ref 31–36)
MCV RBC AUTO: 85.8 FL (ref 80–100)
MONOCYTES ABSOLUTE: 0.5 K/UL (ref 0–1.3)
MONOCYTES RELATIVE PERCENT: 12.2 %
NEUTROPHILS ABSOLUTE: 2.5 K/UL (ref 1.7–7.7)
NEUTROPHILS RELATIVE PERCENT: 63.8 %
PDW BLD-RTO: 14.8 % (ref 12.4–15.4)
PLATELET # BLD: 100 K/UL (ref 135–450)
PMV BLD AUTO: 7.8 FL (ref 5–10.5)
POTASSIUM REFLEX MAGNESIUM: 4.3 MMOL/L (ref 3.5–5.1)
RBC # BLD: 2.8 M/UL (ref 4–5.2)
SODIUM BLD-SCNC: 138 MMOL/L (ref 136–145)
WBC # BLD: 4 K/UL (ref 4–11)

## 2022-07-04 PROCEDURE — 6360000002 HC RX W HCPCS: Performed by: INTERNAL MEDICINE

## 2022-07-04 PROCEDURE — C9113 INJ PANTOPRAZOLE SODIUM, VIA: HCPCS | Performed by: INTERNAL MEDICINE

## 2022-07-04 PROCEDURE — 99233 SBSQ HOSP IP/OBS HIGH 50: CPT | Performed by: INTERNAL MEDICINE

## 2022-07-04 PROCEDURE — 85025 COMPLETE CBC W/AUTO DIFF WBC: CPT

## 2022-07-04 PROCEDURE — 6360000002 HC RX W HCPCS

## 2022-07-04 PROCEDURE — 85014 HEMATOCRIT: CPT

## 2022-07-04 PROCEDURE — 80048 BASIC METABOLIC PNL TOTAL CA: CPT

## 2022-07-04 PROCEDURE — 36415 COLL VENOUS BLD VENIPUNCTURE: CPT

## 2022-07-04 PROCEDURE — 85018 HEMOGLOBIN: CPT

## 2022-07-04 PROCEDURE — C9113 INJ PANTOPRAZOLE SODIUM, VIA: HCPCS

## 2022-07-04 PROCEDURE — 83735 ASSAY OF MAGNESIUM: CPT

## 2022-07-04 PROCEDURE — 2580000003 HC RX 258

## 2022-07-04 PROCEDURE — 1200000000 HC SEMI PRIVATE

## 2022-07-04 RX ORDER — HYDROXYZINE HYDROCHLORIDE 10 MG/1
10 TABLET, FILM COATED ORAL 3 TIMES DAILY PRN
Status: DISCONTINUED | OUTPATIENT
Start: 2022-07-04 | End: 2022-07-07 | Stop reason: HOSPADM

## 2022-07-04 RX ORDER — PREDNISONE 20 MG/1
40 TABLET ORAL DAILY
Status: DISCONTINUED | OUTPATIENT
Start: 2022-07-05 | End: 2022-07-06

## 2022-07-04 RX ORDER — PANTOPRAZOLE SODIUM 40 MG/10ML
40 INJECTION, POWDER, LYOPHILIZED, FOR SOLUTION INTRAVENOUS 2 TIMES DAILY
Status: DISCONTINUED | OUTPATIENT
Start: 2022-07-04 | End: 2022-07-05

## 2022-07-04 RX ADMIN — PANTOPRAZOLE SODIUM 8 MG/HR: 40 INJECTION, POWDER, FOR SOLUTION INTRAVENOUS at 08:02

## 2022-07-04 RX ADMIN — PANTOPRAZOLE SODIUM 40 MG: 40 INJECTION, POWDER, LYOPHILIZED, FOR SOLUTION INTRAVENOUS at 21:53

## 2022-07-04 RX ADMIN — PANTOPRAZOLE SODIUM 40 MG: 40 INJECTION, POWDER, LYOPHILIZED, FOR SOLUTION INTRAVENOUS at 14:09

## 2022-07-04 RX ADMIN — SODIUM CHLORIDE, PRESERVATIVE FREE 10 ML: 5 INJECTION INTRAVENOUS at 21:54

## 2022-07-04 ASSESSMENT — ENCOUNTER SYMPTOMS
NAUSEA: 1
SHORTNESS OF BREATH: 0
BLOOD IN STOOL: 0
COUGH: 0

## 2022-07-04 NOTE — PROGRESS NOTES
Patient A&OX4. VSS. Patient has not reported any pain or nausea during shift. Patient is able to move freely within room. IVF and Protonix gtt infusing per orders. Patient is voiding freely into hat. Patient is tolerating full liquid diet, NPO at midnight for EGD tomorrow morning. All patient care needs have been met at this time. Will continue to monitor.      Electronically signed by Alan Diaz RN on 7/4/2022 at 11:14 AM

## 2022-07-04 NOTE — PROGRESS NOTES
Progress Note    Admit Date: 7/2/2022  Day: 3  Diet: Diet NPO Exceptions are: Sips of Water with Meds    CC: Abdominal pain    Interval history: Blood pressure dropped to 94/55 overnight, other VSS. Patients complains of vague discomfort in right ear but denies hearing loss or dizziness. Reports being anxious about upcoming EGD procedure and would like anti-anxiety medication prior. Clearing urine fine. Denies melena. No other complaints. Berenice Mcgovern HPI: Miss Lee Coy is a 69 y/o female who presents to the ED under guidance from her PCP because of an abnormal creatinine and persistent GI pain. She had been taking a baby aspirin daily for a couple of months and then started taking advil q4-6h for 2-3 days on an empty stomach to relieve her pain. She was feeling weak recently and decided to visit her PCP, who took her labs.       ED Course: She states the abdominal pain is located in the epigastric region and is mild. It does not radiate and nothing improves it or makes it worse. She has not tried anything to make it better. She reports anorexia and worsening pain at night. She denies chest pain, f/c, coughing, SOB, urinary symptoms, head ache or N/V. She has no history of abdominal pain.      On CT, there were no findings for acute intra-abdominal or pelvic abnormality to explain the symptoms. Only lab abnormalities were a creatinine of 3.9 (baseline 0.6) and Hgb of 9.6. VSS apart from blood pressure, which was up to 161/80. Patient seen by GI, who agrees with suspected diagnosis of PUD for abdominal pain. Planning for EGD on Tuesday.     Medications:     Scheduled Meds:   [START ON 7/10/2022] atorvastatin  20 mg Oral Daily    sodium chloride flush  5-40 mL IntraVENous 2 times per day    lidocaine  1 patch TransDERmal Daily     Continuous Infusions:   pantoprozole (PROTONIX) infusion 8 mg/hr (07/04/22 0538)    sodium chloride      lactated ringers 50 mL/hr at 07/04/22 0538     PRN Meds:labetalol, melatonin, sodium chloride flush, sodium chloride, ondansetron **OR** ondansetron, polyethylene glycol, acetaminophen **OR** acetaminophen    Objective:   Vitals:   T-max:  Patient Vitals for the past 8 hrs:   BP Temp Temp src Pulse Resp SpO2   07/03/22 2314 (!) 94/55 99.2 °F (37.3 °C) Oral 78 14 95 %       Intake/Output Summary (Last 24 hours) at 7/4/2022 0614  Last data filed at 7/4/2022 0538  Gross per 24 hour   Intake 2168.87 ml   Output 2000 ml   Net 168.87 ml       Review of Systems   Constitutional: Negative for fatigue and fever. Respiratory: Negative for cough and shortness of breath. Cardiovascular: Negative for chest pain. Gastrointestinal: Positive for nausea. Negative for blood in stool. Genitourinary: Negative for difficulty urinating and hematuria. Neurological: Negative for weakness, light-headedness and headaches. Physical Exam  Cardiovascular:      Rate and Rhythm: Normal rate and regular rhythm. Pulses: Normal pulses. Heart sounds: Murmur (Systolic heart murmur best heard at base) heard. Pulmonary:      Effort: Pulmonary effort is normal.      Breath sounds: Normal breath sounds. Abdominal:      General: Abdomen is flat. Bowel sounds are normal.      Palpations: Abdomen is soft. Neurological:      Mental Status: She is alert and oriented to person, place, and time. LABS:    CBC:   Recent Labs     07/01/22  1606 07/01/22  1606 07/02/22  1205 07/03/22  0546 07/03/22  1944   WBC 5.3  --  5.8 4.2  --    HGB 9.6*   < > 9.7* 8.2* 8.6*   HCT 29.2*   < > 30.3* 24.4* 25.8*     --  166 112*  --    MCV 86.2  --  86.3 84.8  --     < > = values in this interval not displayed.      Renal:    Recent Labs     07/01/22  1606 07/02/22  1205 07/03/22  0546    142 144   K 4.4 4.3 4.3    107 112*   CO2 15* 19* 16*   BUN 63* 58* 47*   CREATININE 3.9* 3.9* 3.8*   GLUCOSE 103* 130* 88   CALCIUM 9.9 9.8 9.4   MG  --   --  1.90   ANIONGAP 24* 16 16     Hepatic:   Recent Labs     07/01/22  1606   AST 15   ALT 7*   BILITOT 0.3   PROT 7.1   LABALBU 4.7   ALKPHOS 92     Troponin: No results for input(s): TROPONINI in the last 72 hours. BNP: No results for input(s): BNP in the last 72 hours. Lipids: No results for input(s): CHOL, HDL in the last 72 hours. Invalid input(s): LDLCALCU, TRIGLYCERIDE  ABGs:  No results for input(s): PHART, JRL0NSR, PO2ART, QLE9MIZ, BEART, THGBART, U1XYHVPN, GTN0SPL in the last 72 hours. INR: No results for input(s): INR in the last 72 hours. Lactate: No results for input(s): LACTATE in the last 72 hours. Cultures:  -----------------------------------------------------------------  RAD:   CT ABDOMEN PELVIS WO CONTRAST Additional Contrast? None   Final Result      1. No findings for acute intra-abdominal or pelvic abnormality to explain patient's symptoms. 2.  Hepatic cyst.      3.  Small hiatal hernia. Assessment/Plan:   Patient is a 69 y/o female who a PMHx of THADDEUS, HLD and Osteoporosis who presents to the ED under guidance from her PCP who saw abnormal labs.     Acute Kidney Injury  Creatinine of 3.9 from a baseline of 0.6. 3.9 this am.  -  Urine studies - indicative of possible postrenal etiology but patient had been receiving IVF. History is suggestive of NSAID induced AIN  - Could benefit from Steroids  -  trend Cr, UOP  -  bladder scan qshift  -  avoid nephrotoxins  -  nephrology consult     Metabolic acidosis  Patient with bicarbonate of 15 on admission. Bicarb now 23. Initially had anion gap up to 24 which has now resolved. May be due to acute renal failure. Was previously receiving NS infusion.  -continue monitor  -switched to LR      Anemia 2/2 to possible PUD  Hemoglobin of 9.7 from baseline ~12, down to 7.7 this am (was 8.6 yesterday). May be partially dilutional due to IVF.  Month long use of NSAIDs and increase pain at night point to PUD as possible culprit  - H/H q12, transfuse if below 7  - LR @ 75 cc/hr  - Protonix infusion  - Hold NSAIDs  - GI plans on doing EGD on Tuesday  - NPO at midnight on Monday going into Tuesday    Hypertension  Blood pressure up to 160  - Labetolol PRN q4h if SBP above 180     Thromboyctopenia  Platelets 308. Currently no indication for transfusion.  Likely dilutional  -continue monitor     Will discuss with attending physician Dr. Maradiaga    Code Status: Full  FEN: NPO  PPX: SCD  DISPO: Jyotsna Pires MD, PGY-1  07/04/22  6:14 AM    This patient has been staffed and discussed with Edmar Salinas MD.

## 2022-07-04 NOTE — PROGRESS NOTES
Nephrology Consult Note                                                                                                                                                                                                                                                                                                                                                               Office : [phone]     Fax :[phone]              Patient's Name: Elisabeth Tadeo      22    Cr stable   Has Acidosis   Ur studies - 4.4 gm         Past Medical History:   Diagnosis Date    [facility]ADDEUS (acute kidney injury) ([facility] [address] 2022    Bilateral carotid artery stenosis 2022    Bilateral carotid artery stenosis 2022    Encounter for cervical Pap smear with pelvic exam *Oct. 11,2019    Negative     Hyperlipidemia     Osteoporosis DEXA - 2011    Lumbar T score -2.5 and frm neck -1.8    Screening mammogram for high-risk patient November 10, 2011    Negative    Screening mammogram, encounter for *2017    Negative    Screening mammogram, encounter for *2018    Negative    Screening mammogram, encounter for *2019    Negative    Screening mammogram, encounter for *Leidy 15, 2020    Negative    Vitamin D deficiency     Wrist fracture, left Mar., 2016    Left       Past Surgical History:   Procedure Laterality Date    COLONOSCOPY  Dec., 2002 (  )    Dr. Shiva Garces - normal.    COLONOSCOPY  Oct., 2013 (  )    Kenny Ochoa - Normal    CYSTOSCOPY  *May 14, 2020    Dr. Tomasa Hull - negative       Family History   Problem Relation Age of Onset    Hypertension Father 80        , hypertension, facial tumor.  Cancer Mother 80        , carcinoma of the lung.  Cancer Brother         prostate    Breast Cancer Daughter 39        reports that she has never smoked. She has never used smokeless tobacco. She reports current alcohol use.     Allergies:  Patient has no known allergies. Current Medications:    hydrOXYzine HCl (ATARAX) tablet 10 mg, TID PRN  pantoprazole (PROTONIX) injection 40 mg, BID  labetalol (NORMODYNE;TRANDATE) injection 10 mg, Q6H PRN  melatonin tablet 6 mg, Nightly PRN  [START ON 7/10/2022] atorvastatin (LIPITOR) tablet 20 mg, Daily  sodium chloride flush 0.9 % injection 5-40 mL, 2 times per day  sodium chloride flush 0.9 % injection 5-40 mL, PRN  0.9 % sodium chloride infusion, PRN  ondansetron (ZOFRAN-ODT) disintegrating tablet 4 mg, Q8H PRN   Or  ondansetron (ZOFRAN) injection 4 mg, Q6H PRN  polyethylene glycol (GLYCOLAX) packet 17 g, Daily PRN  acetaminophen (TYLENOL) tablet 650 mg, Q6H PRN   Or  acetaminophen (TYLENOL) suppository 650 mg, Q6H PRN  lidocaine 4 % external patch 1 patch, Daily        Review of Systems:   14 point ROS obtained but were negative except mentioned in HPI      Physical exam:     Vitals:  /75   Pulse 77   Temp 97.5 °F (36.4 °C) (Oral)   Resp 16   Wt 121 lb 8 oz (55.1 kg)   SpO2 97%   BMI 23.73 kg/m²   Constitutional:  OAA X3 NAD  Skin: no rash, turgor wnl  Heent:  eomi, mmm  Neck: no bruits or jvd noted  Cardiovascular:  S1, S2 without m/r/g  Respiratory: CTA B without w/r/r  Abdomen:  +bs, soft, nt, nd  Ext: + lower extremity edema  Psychiatric: mood and affect appropriate  Musculoskeletal:  Rom, muscular strength intact    Data:   Labs:  CBC:   Recent Labs     07/02/22  1205 07/02/22  1205 07/03/22  0546 07/03/22  1944 07/04/22  0618   WBC 5.8  --  4.2  --  4.0   HGB 9.7*   < > 8.2* 8.6* 7.7*     --  112*  --  100*    < > = values in this interval not displayed.      BMP:    Recent Labs     07/02/22  1205 07/03/22  0546 07/04/22  0618    144 138   K 4.3 4.3 4.3    112* 108   CO2 19* 16* 23   BUN 58* 47* 38*   CREATININE 3.9* 3.8* 3.9*   GLUCOSE 130* 88 91     Ca/Mg/Phos:   Recent Labs     07/02/22  1205 07/03/22  0546 07/04/22  0618   CALCIUM 9.8 9.4 9.6   MG  --  1.90 1.80     Hepatic:   Recent Labs 07/01/22  1606   AST 15   ALT 7*   BILITOT 0.3   ALKPHOS 92     Troponin: No results for input(s): TROPONINI in the last 72 hours. BNP: No results for input(s): BNP in the last 72 hours. Lipids: No results for input(s): CHOL, TRIG, HDL, LDLCALC, LABVLDL in the last 72 hours. ABGs: No results for input(s): PHART, PO2ART, JMN7GHY in the last 72 hours. INR: No results for input(s): INR in the last 72 hours. UA:  Recent Labs     07/02/22  1304   COLORU Yellow   CLARITYU Clear   GLUCOSEU Negative   BILIRUBINUR Negative   KETUA Negative   SPECGRAV 1.025   BLOODU SMALL*   PHUR 6.0   PROTEINU 30*   UROBILINOGEN 0.2   NITRU Negative   LEUKOCYTESUR Negative   LABMICR YES   URINETYPE NotGiven      Urine Microscopic:   Recent Labs     07/02/22  1304   HYALCAST 0-2   WBCUA 3-5   RBCUA 3-4   EPIU 11-20*     Urine Culture: No results for input(s): LABURIN in the last 72 hours. Urine Chemistry:   Recent Labs     07/02/22  1304 07/02/22  1456 07/03/22  1748   LABCREA  --  34.7 45.1   PROTEINUR  --   --  200.00*   NAUR 34 63  --              IMAGING:  CT ABDOMEN PELVIS WO CONTRAST Additional Contrast? None   Final Result      1. No findings for acute intra-abdominal or pelvic abnormality to explain patient's symptoms. 2.  Hepatic cyst.      3.  Small hiatal hernia. Assessment/Plan   1. THADDEUS - NSAID use     2. HTN    3. Anemia    4. Acid- base/ Electrolyte imbalance     5.  Possible GIB     PLAN   - start steroids for possible AIN   - Ur studies - 4 gms proteinuria  - check serology    - stop NSAID's   - dec IVF   - No obstruction       Recommend to dose adjust all medications  based on renal functions  Maintain SBP> 90 mmHg   Daily weights   AVOID NSAIDs  Avoid Nephrotoxins  Monitor Intake/Output  Call if significant decrease in urine output                 Thank you for allowing us to participate in care of Hank Suh MD  Feel free to contact me   [facility] [place]  Office : [phone]  Fax :[phone]

## 2022-07-04 NOTE — PROGRESS NOTES
VSS, SR on telemetry. Pt has been sleeping most of the night. Pt offered melatonin, but reluctant to take any pills r/t nausea. Zofran given with relief. IVFs and Protonix gtt infusing. Pt npo since 2400 for possible EGD.   Electronically signed by Marybeth Bridges RN on 7/4/22 at 3:10 AM EDT

## 2022-07-04 NOTE — PROGRESS NOTES
[address] [place] [address]  GI Progress Note          Viktoria Milton is a 68 y.o. female patient. 1. THADDEUS (acute kidney injury) ([address]        Admit Date: 2022    Subjective:       She's feeling fine. No further melena. Hb stable when adjusted for hemodilution. Creatinine has not improved. ROS:  Cardiovascular ROS: no chest pain or dyspnea on exertion  Gastrointestinal ROS: no abdominal pain, change in bowel habits, or black or bloody stools  Respiratory ROS: no cough, shortness of breath, or wheezing    Scheduled Meds:   pantoprazole  40 mg IntraVENous BID    [START ON 7/10/2022] atorvastatin  20 mg Oral Daily    sodium chloride flush  5-40 mL IntraVENous 2 times per day    lidocaine  1 patch TransDERmal Daily       Continuous Infusions:   sodium chloride         PRN Meds:  hydrOXYzine HCl, labetalol, melatonin, sodium chloride flush, sodium chloride, ondansetron **OR** ondansetron, polyethylene glycol, acetaminophen **OR** acetaminophen      Objective:       Patient Vitals for the past 24 hrs:   BP Temp Temp src Pulse Resp SpO2   22 1051 139/75 97.5 °F (36.4 °C) Oral 77 16 97 %   22 0804 119/68 99 °F (37.2 °C) Oral 71 16 97 %   22 2314 (!) 94/55 99.2 °F (37.3 °C) Oral 78 14 95 %   22 1948 114/62 98.9 °F (37.2 °C) Oral 80 16 100 %   22 1621 -- -- -- -- -- 99 %   22 1542 126/68 98 °F (36.7 °C) Oral 82 17 99 %       Exam:  VITALS:  /75   Pulse 77   Temp 97.5 °F (36.4 °C) (Oral)   Resp 16   Wt 121 lb 8 oz (55.1 kg)   SpO2 97%   BMI 23.73 kg/m²   TEMPERATURE:  Current - Temp: 97.5 °F (36.4 °C);  Max - Temp  Av.5 °F (36.9 °C)  Min: 97.5 °F (36.4 °C)  Max: 99.2 °F (37.3 °C)    NAD  General appearance: alert, appears stated age, cooperative and no distress  Head: Normocephalic, without obvious abnormality, atraumatic  Neck: supple, symmetrical, trachea midline and thyroid not enlarged, symmetric, no tenderness/mass/nodules  CVS:  RRR, Nl s1s2  Lungs CTA Bilaterally, normal effort  Abdomen: soft, non-tender; bowel sounds normal; no masses,  no organomegaly  AAOx3, No asterixis or encephalopathy  Extremities: No edema. Recent Labs     07/02/22  1205 07/02/22  1205 07/03/22  0546 07/03/22  1944 07/04/22  0618   WBC 5.8  --  4.2  --  4.0   HGB 9.7*   < > 8.2* 8.6* 7.7*   HCT 30.3*   < > 24.4* 25.8* 24.1*   MCV 86.3  --  84.8  --  85.8     --  112*  --  100*    < > = values in this interval not displayed. Recent Labs     07/02/22  1205 07/03/22  0546 07/04/22  0618    144 138   K 4.3 4.3 4.3    112* 108   CO2 19* 16* 23   BUN 58* 47* 38*   CREATININE 3.9* 3.8* 3.9*     Recent Labs     07/01/22  1606   AST 15   ALT 7*   BILITOT 0.3   ALKPHOS 92     No results for input(s): LIPASE, AMYLASE in the last 72 hours. Recent Labs     07/01/22  1606   PROT 7.1     No results for input(s): PTT in the last 72 hours. No results for input(s): OCCULTBLD in the last 72 hours. Radiology review: n/a    Assessment:       Principal Problem:    THADDEUS (acute kidney injury) (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 *      · UGI bleed likely from NSAIDs. No active bleeding. · Renal injury, ? NSAIDs. Recommendations:       EGD tomorrow. D/C PPI infusion. Diet today. Discussed with Dr. Acacia Carias, may need a kidney biopsy.     Rancho Hammer MD  7/4/2022  1:17 PM

## 2022-07-05 ENCOUNTER — ANESTHESIA (OUTPATIENT)
Dept: ENDOSCOPY | Age: 77
DRG: 841 | End: 2022-07-05
Payer: MEDICARE

## 2022-07-05 ENCOUNTER — ANESTHESIA EVENT (OUTPATIENT)
Dept: ENDOSCOPY | Age: 77
DRG: 841 | End: 2022-07-05
Payer: MEDICARE

## 2022-07-05 ENCOUNTER — APPOINTMENT (OUTPATIENT)
Dept: CT IMAGING | Age: 77
DRG: 841 | End: 2022-07-05
Payer: MEDICARE

## 2022-07-05 LAB
ALBUMIN SERPL-MCNC: 3.2 G/DL (ref 3.4–5)
ANION GAP SERPL CALCULATED.3IONS-SCNC: 9 MMOL/L (ref 3–16)
ANTI-NUCLEAR ANTIBODY (ANA): NEGATIVE
BASOPHILS ABSOLUTE: 0 K/UL (ref 0–0.2)
BASOPHILS RELATIVE PERCENT: 0.8 %
BUN BLDV-MCNC: 32 MG/DL (ref 7–20)
CALCIUM SERPL-MCNC: 9.5 MG/DL (ref 8.3–10.6)
CHLORIDE BLD-SCNC: 108 MMOL/L (ref 99–110)
CO2: 23 MMOL/L (ref 21–32)
CREAT SERPL-MCNC: 4.2 MG/DL (ref 0.6–1.2)
EOSINOPHILS ABSOLUTE: 0.1 K/UL (ref 0–0.6)
EOSINOPHILS RELATIVE PERCENT: 3.3 %
GFR AFRICAN AMERICAN: 12
GFR NON-AFRICAN AMERICAN: 10
GLUCOSE BLD-MCNC: 94 MG/DL (ref 70–99)
HCT VFR BLD CALC: 24.2 % (ref 36–48)
HCT VFR BLD CALC: 26.3 % (ref 36–48)
HEMOGLOBIN: 7.9 G/DL (ref 12–16)
HEMOGLOBIN: 8.7 G/DL (ref 12–16)
INR BLD: 1.17 (ref 0.87–1.14)
KAPPA, FREE LIGHT CHAINS, SERUM: 8.29 MG/L (ref 3.3–19.4)
KAPPA/LAMBDA RATIO: 0 (ref 0.26–1.65)
KAPPA/LAMBDA TEST COMMENT: ABNORMAL
LAMBDA, FREE LIGHT CHAINS, SERUM: 5465 MG/L (ref 5.71–26.3)
LYMPHOCYTES ABSOLUTE: 0.8 K/UL (ref 1–5.1)
LYMPHOCYTES RELATIVE PERCENT: 19.8 %
MAGNESIUM: 1.7 MG/DL (ref 1.8–2.4)
MCH RBC QN AUTO: 27.8 PG (ref 26–34)
MCHC RBC AUTO-ENTMCNC: 32.6 G/DL (ref 31–36)
MCV RBC AUTO: 85.4 FL (ref 80–100)
MONOCYTES ABSOLUTE: 0.5 K/UL (ref 0–1.3)
MONOCYTES RELATIVE PERCENT: 12.8 %
NEUTROPHILS ABSOLUTE: 2.7 K/UL (ref 1.7–7.7)
NEUTROPHILS RELATIVE PERCENT: 63.3 %
PDW BLD-RTO: 14.6 % (ref 12.4–15.4)
PHOSPHORUS: 4.6 MG/DL (ref 2.5–4.9)
PLATELET # BLD: 96 K/UL (ref 135–450)
PMV BLD AUTO: 7.6 FL (ref 5–10.5)
POTASSIUM REFLEX MAGNESIUM: 4 MMOL/L (ref 3.5–5.1)
POTASSIUM SERPL-SCNC: 4 MMOL/L (ref 3.5–5.1)
PROTHROMBIN TIME: 14.9 SEC (ref 11.7–14.5)
RBC # BLD: 2.83 M/UL (ref 4–5.2)
SEDIMENTATION RATE, ERYTHROCYTE: 81 MM/HR (ref 0–30)
SODIUM BLD-SCNC: 140 MMOL/L (ref 136–145)
WBC # BLD: 4.2 K/UL (ref 4–11)

## 2022-07-05 PROCEDURE — 85018 HEMOGLOBIN: CPT

## 2022-07-05 PROCEDURE — 82595 ASSAY OF CRYOGLOBULIN: CPT

## 2022-07-05 PROCEDURE — 0DB98ZX EXCISION OF DUODENUM, VIA NATURAL OR ARTIFICIAL OPENING ENDOSCOPIC, DIAGNOSTIC: ICD-10-PCS | Performed by: INTERNAL MEDICINE

## 2022-07-05 PROCEDURE — 80069 RENAL FUNCTION PANEL: CPT

## 2022-07-05 PROCEDURE — 3609012400 HC EGD TRANSORAL BIOPSY SINGLE/MULTIPLE: Performed by: INTERNAL MEDICINE

## 2022-07-05 PROCEDURE — 99233 SBSQ HOSP IP/OBS HIGH 50: CPT | Performed by: INTERNAL MEDICINE

## 2022-07-05 PROCEDURE — 85652 RBC SED RATE AUTOMATED: CPT

## 2022-07-05 PROCEDURE — 36415 COLL VENOUS BLD VENIPUNCTURE: CPT

## 2022-07-05 PROCEDURE — 2500000003 HC RX 250 WO HCPCS: Performed by: NURSE ANESTHETIST, CERTIFIED REGISTERED

## 2022-07-05 PROCEDURE — 2580000003 HC RX 258: Performed by: INTERNAL MEDICINE

## 2022-07-05 PROCEDURE — 6360000002 HC RX W HCPCS: Performed by: NURSE ANESTHETIST, CERTIFIED REGISTERED

## 2022-07-05 PROCEDURE — 6360000002 HC RX W HCPCS: Performed by: STUDENT IN AN ORGANIZED HEALTH CARE EDUCATION/TRAINING PROGRAM

## 2022-07-05 PROCEDURE — 83516 IMMUNOASSAY NONANTIBODY: CPT

## 2022-07-05 PROCEDURE — 83883 ASSAY NEPHELOMETRY NOT SPEC: CPT

## 2022-07-05 PROCEDURE — 88305 TISSUE EXAM BY PATHOLOGIST: CPT

## 2022-07-05 PROCEDURE — 83735 ASSAY OF MAGNESIUM: CPT

## 2022-07-05 PROCEDURE — 6370000000 HC RX 637 (ALT 250 FOR IP): Performed by: INTERNAL MEDICINE

## 2022-07-05 PROCEDURE — 0TB13ZX EXCISION OF LEFT KIDNEY, PERCUTANEOUS APPROACH, DIAGNOSTIC: ICD-10-PCS | Performed by: INTERNAL MEDICINE

## 2022-07-05 PROCEDURE — 85014 HEMATOCRIT: CPT

## 2022-07-05 PROCEDURE — 86038 ANTINUCLEAR ANTIBODIES: CPT

## 2022-07-05 PROCEDURE — 86160 COMPLEMENT ANTIGEN: CPT

## 2022-07-05 PROCEDURE — 85025 COMPLETE CBC W/AUTO DIFF WBC: CPT

## 2022-07-05 PROCEDURE — 0DB68ZX EXCISION OF STOMACH, VIA NATURAL OR ARTIFICIAL OPENING ENDOSCOPIC, DIAGNOSTIC: ICD-10-PCS | Performed by: INTERNAL MEDICINE

## 2022-07-05 PROCEDURE — 88342 IMHCHEM/IMCYTCHM 1ST ANTB: CPT

## 2022-07-05 PROCEDURE — 3700000001 HC ADD 15 MINUTES (ANESTHESIA): Performed by: INTERNAL MEDICINE

## 2022-07-05 PROCEDURE — 6360000002 HC RX W HCPCS: Performed by: INTERNAL MEDICINE

## 2022-07-05 PROCEDURE — 85610 PROTHROMBIN TIME: CPT

## 2022-07-05 PROCEDURE — 2709999900 CT BIOPSY RENAL

## 2022-07-05 PROCEDURE — 2580000003 HC RX 258: Performed by: NURSE ANESTHETIST, CERTIFIED REGISTERED

## 2022-07-05 PROCEDURE — 1200000000 HC SEMI PRIVATE

## 2022-07-05 PROCEDURE — C9113 INJ PANTOPRAZOLE SODIUM, VIA: HCPCS | Performed by: INTERNAL MEDICINE

## 2022-07-05 PROCEDURE — 7100000010 HC PHASE II RECOVERY - FIRST 15 MIN: Performed by: INTERNAL MEDICINE

## 2022-07-05 PROCEDURE — 3700000000 HC ANESTHESIA ATTENDED CARE: Performed by: INTERNAL MEDICINE

## 2022-07-05 PROCEDURE — 2500000003 HC RX 250 WO HCPCS: Performed by: STUDENT IN AN ORGANIZED HEALTH CARE EDUCATION/TRAINING PROGRAM

## 2022-07-05 PROCEDURE — 7100000011 HC PHASE II RECOVERY - ADDTL 15 MIN: Performed by: INTERNAL MEDICINE

## 2022-07-05 PROCEDURE — 2709999900 HC NON-CHARGEABLE SUPPLY: Performed by: INTERNAL MEDICINE

## 2022-07-05 RX ORDER — MIDAZOLAM HYDROCHLORIDE 1 MG/ML
1 INJECTION INTRAMUSCULAR; INTRAVENOUS ONCE
Status: COMPLETED | OUTPATIENT
Start: 2022-07-05 | End: 2022-07-05

## 2022-07-05 RX ORDER — SODIUM CHLORIDE 9 MG/ML
INJECTION, SOLUTION INTRAVENOUS ONCE
Status: COMPLETED | OUTPATIENT
Start: 2022-07-05 | End: 2022-07-05

## 2022-07-05 RX ORDER — SODIUM CHLORIDE 9 MG/ML
INJECTION, SOLUTION INTRAVENOUS CONTINUOUS PRN
Status: DISCONTINUED | OUTPATIENT
Start: 2022-07-05 | End: 2022-07-05 | Stop reason: SDUPTHER

## 2022-07-05 RX ORDER — LIDOCAINE HYDROCHLORIDE 10 MG/ML
INJECTION, SOLUTION EPIDURAL; INFILTRATION; INTRACAUDAL; PERINEURAL PRN
Status: DISCONTINUED | OUTPATIENT
Start: 2022-07-05 | End: 2022-07-05 | Stop reason: SDUPTHER

## 2022-07-05 RX ORDER — FENTANYL CITRATE 50 UG/ML
50 INJECTION, SOLUTION INTRAMUSCULAR; INTRAVENOUS ONCE
Status: COMPLETED | OUTPATIENT
Start: 2022-07-05 | End: 2022-07-05

## 2022-07-05 RX ORDER — MAGNESIUM SULFATE 1 G/100ML
1000 INJECTION INTRAVENOUS ONCE
Status: COMPLETED | OUTPATIENT
Start: 2022-07-05 | End: 2022-07-05

## 2022-07-05 RX ORDER — LIDOCAINE HYDROCHLORIDE 20 MG/ML
10 INJECTION, SOLUTION EPIDURAL; INFILTRATION; INTRACAUDAL; PERINEURAL ONCE
Status: COMPLETED | OUTPATIENT
Start: 2022-07-05 | End: 2022-07-05

## 2022-07-05 RX ORDER — PROPOFOL 10 MG/ML
INJECTION, EMULSION INTRAVENOUS PRN
Status: DISCONTINUED | OUTPATIENT
Start: 2022-07-05 | End: 2022-07-05 | Stop reason: SDUPTHER

## 2022-07-05 RX ADMIN — LIDOCAINE HYDROCHLORIDE 10 ML: 20 INJECTION, SOLUTION EPIDURAL; INFILTRATION; INTRACAUDAL; PERINEURAL at 12:00

## 2022-07-05 RX ADMIN — MIDAZOLAM 1 MG: 1 INJECTION INTRAMUSCULAR; INTRAVENOUS at 12:02

## 2022-07-05 RX ADMIN — PROPOFOL 50 MG: 10 INJECTION, EMULSION INTRAVENOUS at 14:14

## 2022-07-05 RX ADMIN — PANTOPRAZOLE SODIUM 40 MG: 40 INJECTION, POWDER, LYOPHILIZED, FOR SOLUTION INTRAVENOUS at 08:28

## 2022-07-05 RX ADMIN — LIDOCAINE HYDROCHLORIDE 50 MG: 10 INJECTION, SOLUTION EPIDURAL; INFILTRATION; INTRACAUDAL; PERINEURAL at 14:12

## 2022-07-05 RX ADMIN — FENTANYL CITRATE 50 MCG: 50 INJECTION, SOLUTION INTRAMUSCULAR; INTRAVENOUS at 12:02

## 2022-07-05 RX ADMIN — PROPOFOL 50 MG: 10 INJECTION, EMULSION INTRAVENOUS at 14:12

## 2022-07-05 RX ADMIN — POLYETHYLENE GLYCOL 3350, SODIUM SULFATE ANHYDROUS, SODIUM BICARBONATE, SODIUM CHLORIDE, POTASSIUM CHLORIDE 2000 ML: 236; 22.74; 6.74; 5.86; 2.97 POWDER, FOR SOLUTION ORAL at 17:55

## 2022-07-05 RX ADMIN — SODIUM CHLORIDE, PRESERVATIVE FREE 10 ML: 5 INJECTION INTRAVENOUS at 22:54

## 2022-07-05 RX ADMIN — SODIUM CHLORIDE: 9 INJECTION, SOLUTION INTRAVENOUS at 12:50

## 2022-07-05 RX ADMIN — MAGNESIUM SULFATE HEPTAHYDRATE 1000 MG: 1 INJECTION, SOLUTION INTRAVENOUS at 08:36

## 2022-07-05 RX ADMIN — SODIUM CHLORIDE: 9 INJECTION, SOLUTION INTRAVENOUS at 14:04

## 2022-07-05 RX ADMIN — PREDNISONE 40 MG: 20 TABLET ORAL at 08:28

## 2022-07-05 ASSESSMENT — ENCOUNTER SYMPTOMS
ABDOMINAL PAIN: 0
SHORTNESS OF BREATH: 0
NAUSEA: 0
VOMITING: 0
COUGH: 0

## 2022-07-05 ASSESSMENT — PAIN SCALES - WONG BAKER
WONGBAKER_NUMERICALRESPONSE: 0

## 2022-07-05 ASSESSMENT — LIFESTYLE VARIABLES: SMOKING_STATUS: 0

## 2022-07-05 ASSESSMENT — PAIN - FUNCTIONAL ASSESSMENT: PAIN_FUNCTIONAL_ASSESSMENT: NONE - DENIES PAIN

## 2022-07-05 NOTE — BRIEF OP NOTE
Patient:  Bereket Garcia   :   1945    The procedure including risks and benefits was discussed at length with the patient (or designated family member) and all questions were answered. Informed consent to proceed with the procedure was given.       PROCEDURE : non-targeted left kidney biopsy without complication    BLOOD LOSS : Minimal  SPECIMENS : None  COMPLICATIONS : None  CONDITION : Stable      Randy Matute MD
normal...

## 2022-07-05 NOTE — FLOWSHEET NOTE
Patient arrived from room A & O x 4, breathing easily on room air. Spoke to Dr. Saira Ng prior to procedure. Meds and labs reviewed, consent verified. Patient had a left renal core biopsy and tolerated procedure well. No bleeding at site, dressing applied. Patient sent to ENDO for procedure via stretcher in stable condition, VSS.

## 2022-07-05 NOTE — PROGRESS NOTES
Patient:  Diogo Lima   :   1945      Relevant clinical history, particularly as it involves the pending procedure, was reviewed and discussed. The procedure including risks and benefits was discussed at length with the patient (or designated family member) and all questions were answered. Informed consent to proceed with the procedure was given. Vital signs were monitored and documented by the Radiology nurse. Targeted physical examination  Heart : regular rate and rhythm  Lungs : clear, breathing easily  Condition : stable    [facility] nurses notes reviewed and agreed. Past Medical History:        Diagnosis Date    THADDEUS (acute kidney injury) ([facility] [address] 2022    Bilateral carotid artery stenosis 2022    Bilateral carotid artery stenosis 2022    Encounter for cervical Pap smear with pelvic exam *Oct. 11,2019    Negative     Hyperlipidemia     Osteoporosis DEXA - 2011    Lumbar T score -2.5 and frm neck -1.8    Screening mammogram for high-risk patient November 10, 2011    Negative    Screening mammogram, encounter for *2017    Negative    Screening mammogram, encounter for *2018    Negative    Screening mammogram, encounter for *2019    Negative    Screening mammogram, encounter for *Leidy 15, 2020    Negative    Vitamin D deficiency     Wrist fracture, left Mar., 2016    Left       Past Surgical History:           Procedure Laterality Date    COLONOSCOPY  Dec., 2002 (  )    Dr. Nicole Lucas - normal.    COLONOSCOPY  Oct., 2013 (  )    Margaux Mcknight - Normal    CYSTOSCOPY  *May 14, 2020    Dr. Rodolfo Vazquez - negative       Allergies:  Patient has no known allergies. Medications:   Home Meds  No current facility-administered medications on file prior to encounter.      Current Outpatient Medications on File Prior to Encounter   Medication Sig Dispense Refill    aspirin 81 MG EC tablet Take 81 mg by mouth daily (Patient not taking: Reported on 7/1/2022)      pantoprazole (PROTONIX) 40 MG tablet Take 1 tablet by mouth daily (with breakfast) (Patient not taking: Reported on 7/1/2022) 30 tablet 3    atorvastatin (LIPITOR) 20 MG tablet TAKE ONE TABLET BY MOUTH DAILY 90 tablet 3    Calcium Carbonate-Vitamin D (CALCIUM + D) 600-200 MG-UNIT TABS Take  by mouth 2 times daily.    (Patient not taking: Reported on 7/1/2022)         Current Meds  hydrOXYzine HCl (ATARAX) tablet 10 mg, TID PRN  pantoprazole (PROTONIX) injection 40 mg, BID  predniSONE (DELTASONE) tablet 40 mg, Daily  labetalol (NORMODYNE;TRANDATE) injection 10 mg, Q6H PRN  melatonin tablet 6 mg, Nightly PRN  [START ON 7/10/2022] atorvastatin (LIPITOR) tablet 20 mg, Daily  sodium chloride flush 0.9 % injection 5-40 mL, 2 times per day  sodium chloride flush 0.9 % injection 5-40 mL, PRN  0.9 % sodium chloride infusion, PRN  ondansetron (ZOFRAN-ODT) disintegrating tablet 4 mg, Q8H PRN   Or  ondansetron (ZOFRAN) injection 4 mg, Q6H PRN  polyethylene glycol (GLYCOLAX) packet 17 g, Daily PRN  acetaminophen (TYLENOL) tablet 650 mg, Q6H PRN   Or  acetaminophen (TYLENOL) suppository 650 mg, Q6H PRN  lidocaine 4 % external patch 1 patch, Daily          ASA 3 - Patient with moderate systemic disease with functional limitations    II (soft palate, uvula, fauces visible)    Activity:  2 - Able to move 4 extremities voluntarily on command  Respiration:  2 - Able to breathe deeply and cough freely  Circulation:  2 - BP+/- 20mmHg of normal  Consciousness:  2 - Fully awake  Oxygen Saturation (color):  2 - Able to maintain oxygen saturation >92% on room air    Sedation : Moderate sedation planned

## 2022-07-05 NOTE — PROGRESS NOTES
Progress Note    Admit Date: 7/2/2022  Day: 4  Diet: Diet NPO Exceptions are: Sips of Water with Meds    CC: Abdominal Pain    Interval history: Creatinine increased today to 4.2 (from 3.9) while the Hgb dropped from 9/2 to 7.7. Patient remains asymptomatic. Has been ambulating to go to bathroom and denies light headedness or SOB. Patient was hypertensive ON (into 140s), other VSS. No new complaints. HPI: Miss Rashmi Freed is a 69 y/o female who presents to the ED under guidance from her PCP because of an abnormal creatinine and persistent GI pain. She had been taking a baby aspirin daily for a couple of months and then started taking advil q4-6h for 2-3 days on an empty stomach to relieve her pain. She was feeling weak recently and decided to visit her PCP, who took her labs.       ED Course: She states the abdominal pain is located in the epigastric region and is mild. It does not radiate and nothing improves it or makes it worse. She has not tried anything to make it better. She reports anorexia and worsening pain at night. She denies chest pain, f/c, coughing, SOB, urinary symptoms, head ache or N/V. She has no history of abdominal pain.      On CT, there were no findings for acute intra-abdominal or pelvic abnormality to explain the symptoms. Only lab abnormalities were a creatinine of 3.9 (baseline 0.6) and Hgb of 9.6. VSS apart from blood pressure, which was up to 161/80.       Patient seen by GI, who agrees with suspected diagnosis of PUD for abdominal pain. Planning for EGD for Today. Patient had proteinuria, so was suspected of having NSAID induced MARYANN superimposed on AIN. Will start on prednisone on 7/5.      Medications:     Scheduled Meds:   magnesium sulfate  1,000 mg IntraVENous Once    pantoprazole  40 mg IntraVENous BID    predniSONE  40 mg Oral Daily    [START ON 7/10/2022] atorvastatin  20 mg Oral Daily    sodium chloride flush  5-40 mL IntraVENous 2 times per day    lidocaine  1 patch TransDERmal Daily     Continuous Infusions:   sodium chloride       PRN Meds:hydrOXYzine HCl, labetalol, melatonin, sodium chloride flush, sodium chloride, ondansetron **OR** ondansetron, polyethylene glycol, acetaminophen **OR** acetaminophen    Objective:   Vitals:   T-max:  Patient Vitals for the past 8 hrs:   BP Temp Temp src Pulse Resp SpO2   07/05/22 0825 137/76 98 °F (36.7 °C) Oral 81 16 95 %   07/05/22 0318 (!) 148/77 98.2 °F (36.8 °C) Oral 80 15 94 %       Intake/Output Summary (Last 24 hours) at 7/5/2022 0906  Last data filed at 7/5/2022 0739  Gross per 24 hour   Intake 10 ml   Output 1800 ml   Net -1790 ml       Review of Systems   Constitutional: Negative for chills and fever. Respiratory: Negative for cough and shortness of breath. Cardiovascular: Negative for chest pain. Gastrointestinal: Negative for abdominal pain, nausea and vomiting. Genitourinary:        Patient describes having frothy urine     Neurological: Negative for weakness, light-headedness and headaches. Psychiatric/Behavioral: The patient is nervous/anxious (Anxious about upcoming EGD). Physical Exam  Cardiovascular:      Rate and Rhythm: Normal rate and regular rhythm. Pulses: Normal pulses. Heart sounds: Murmur (Systolic heart murmur best heard at base) heard. Pulmonary:      Effort: Pulmonary effort is normal.      Breath sounds: Normal breath sounds. Abdominal:      General: Abdomen is flat. Bowel sounds are normal.      Palpations: Abdomen is soft. Neurological:      Mental Status: She is alert and oriented to person, place, and time.       LABS:    CBC:   Recent Labs     07/03/22 0546 07/03/22  1944 07/04/22 0618 07/04/22 2124 07/05/22 0528   WBC 4.2  --  4.0  --  4.2   HGB 8.2*   < > 7.7* 9.2* 7.9*   HCT 24.4*   < > 24.1* 28.7* 24.2*   *  --  100*  --  96*   MCV 84.8  --  85.8  --  85.4    < > = values in this interval not displayed.      Renal:    Recent Labs     07/03/22  0546 07/03/22  0546 07/04/22  0618 07/05/22  0528     --  138 140   K 4.3   < > 4.3 4.0  4.0   *  --  108 108   CO2 16*  --  23 23   BUN 47*  --  38* 32*   CREATININE 3.8*  --  3.9* 4.2*   GLUCOSE 88  --  91 94   CALCIUM 9.4  --  9.6 9.5   MG 1.90  --  1.80 1.70*   PHOS  --   --   --  4.6   ANIONGAP 16  --  7 9    < > = values in this interval not displayed. Hepatic:   Recent Labs     07/05/22  0528   LABALBU 3.2*     Troponin: No results for input(s): TROPONINI in the last 72 hours. BNP: No results for input(s): BNP in the last 72 hours. Lipids: No results for input(s): CHOL, HDL in the last 72 hours. Invalid input(s): LDLCALCU, TRIGLYCERIDE  ABGs:  No results for input(s): PHART, BEK2VXN, PO2ART, KJA6GRI, BEART, THGBART, E8FKTEYP, VLF7SMC in the last 72 hours. INR: No results for input(s): INR in the last 72 hours. Lactate: No results for input(s): LACTATE in the last 72 hours. Cultures:  -----------------------------------------------------------------  RAD:   CT ABDOMEN PELVIS WO CONTRAST Additional Contrast? None   Final Result      1. No findings for acute intra-abdominal or pelvic abnormality to explain patient's symptoms. 2.  Hepatic cyst.      3.  Small hiatal hernia. Assessment/Plan:   Patient is a 67 y/o female who a PMHx of THADDEUS, HLD and Osteoporosis who presents to the ED under guidance from her PCP who saw abnormal labs.     Acute Kidney Injury  Creatinine of 3.9 from a baseline of 0.6. 3.9 this am.   - Protein UR of 200; there could be superimposed NSAID induced MARYANN and AIN  -  Start prednisone 40 mg PO qd. -  Urine studies - indicative of possible postrenal etiology but patient had been receiving IVF. History is suggestive of NSAID induced AIN  - Could benefit from Steroids  -  trend Cr, UOP  -  bladder scan qshift  -  avoid nephrotoxins  -  nephrology consult, considering biopsy     Metabolic acidosis  Patient with bicarbonate of 15 on admission. Bicarb now 23. Initially had anion gap up to 24 which has now resolved. May be due to acute renal failure. Was previously receiving NS infusion.  -continue monitor  -switched to [facility]     Anemia 2/2 to possible PUD  Hemoglobin of 9.7 in ED from baseline ~12, down to 7.9 this am (was 9.2 yesterday). May be partially dilutional due to IVF. Month long use of NSAIDs and increase pain at night point to PUD as possible culprit  - H/H q12, transfuse if below 7  - LR @ 75 cc/hr  - Protonix infusion  - Hold NSAIDs  - GI plans on doing EGD on Tuesday  - NPO at midnight on Monday going into Tuesday     Hypertension  Blood pressure up to 160  - Labetolol PRN q4h if SBP above 180     Thromboyctopenia  Platelets 96 (was 908 yesterday). Currently no indication for transfusion.  Likely dilutional  - continue monitor    Code Status: Full  FEN: NPO  PPX: SCD  DISPO: Karalee Libman, MD, PGY-1  07/05/22  9:06 AM    This patient has been staffed and discussed with Teena Paulson MD.

## 2022-07-05 NOTE — ANESTHESIA POSTPROCEDURE EVALUATION
Department of Anesthesiology  Postprocedure Note    Patient: Ash Adams  MRN: [id number]  YOB: 1945  Date of evaluation: 7/5/2022      Procedure Summary     Date: 07/05/22 Room / Location: [facility]    Anesthesia Start: 2225 Anesthesia Stop: 2599    Procedure: EGD BIOPSY (N/A ) Diagnosis:       Melena      (Melena [K92.1])    Surgeons: Jesus Bunch MD Responsible Provider: Debbie Mcneil DO    Anesthesia Type: MAC ASA Status: 3          Anesthesia Type: No value filed.     Esau Phase I: Esau Score: 10    Esau Phase II: Esau Score: 10      Anesthesia Post Evaluation    Patient location during evaluation: PACU  Level of consciousness: awake  Complications: no  Multimodal analgesia pain management approach

## 2022-07-05 NOTE — PROGRESS NOTES
Pt alert & oriented x4, BP elevated, other VSS. Pt denies pain and nausea overnight. NPO after midnight for EGD in AM.   Pt independent in room & hallways w/ steady gait. IV saline locked. Pt has no needs at this time. Call light and bedside table within reach.

## 2022-07-05 NOTE — PROGRESS NOTES
Nephrology Consult Note                                                                                                                                                                                                                                                                                                                                                               Office : [phone]     Fax :[phone]              Patient's Name: Driss Hess      22    Cr worse   Acidosis better   Ur studies - 4.4 gm         Past Medical History:   Diagnosis Date    THADDEUS (acute kidney injury) ([facility] [address] 2022    Bilateral carotid artery stenosis 2022    Bilateral carotid artery stenosis 2022    Encounter for cervical Pap smear with pelvic exam *Oct. 11,2019    Negative     Hyperlipidemia     Osteoporosis DEXA - 2011    Lumbar T score -2.5 and frm neck -1.8    Screening mammogram for high-risk patient November 10, 2011    Negative    Screening mammogram, encounter for *2017    Negative    Screening mammogram, encounter for *2018    Negative    Screening mammogram, encounter for *2019    Negative    Screening mammogram, encounter for *Leidy 15, 2020    Negative    Vitamin D deficiency     Wrist fracture, left Mar., 2016    Left       Past Surgical History:   Procedure Laterality Date    COLONOSCOPY  Dec., 2002 (  )    Dr. Josh Brito - normal.    COLONOSCOPY  Oct., 2013 (  )    Ahmet Braxton - Normal    CYSTOSCOPY  *May 14, 2020    Dr. Keya Pérez - negative       Family History   Problem Relation Age of Onset    Hypertension Father 80        , hypertension, facial tumor.  Cancer Mother 80        , carcinoma of the lung.  Cancer Brother         prostate    Breast Cancer Daughter 39        reports that she has never smoked. She has never used smokeless tobacco. She reports current alcohol use.     Allergies:  Patient has no known --  91 94    < > = values in this interval not displayed. Ca/Mg/Phos:   Recent Labs     07/03/22  0546 07/04/22  0618 07/05/22  0528   CALCIUM 9.4 9.6 9.5   MG 1.90 1.80 1.70*   PHOS  --   --  4.6     Hepatic:   No results for input(s): AST, ALT, ALB, BILITOT, ALKPHOS in the last 72 hours. Troponin: No results for input(s): TROPONINI in the last 72 hours. BNP: No results for input(s): BNP in the last 72 hours. Lipids: No results for input(s): CHOL, TRIG, HDL, LDLCALC, LABVLDL in the last 72 hours. ABGs: No results for input(s): PHART, PO2ART, RVU5NNW in the last 72 hours. INR: No results for input(s): INR in the last 72 hours. UA:  Recent Labs     07/02/22  1304   COLORU Yellow   CLARITYU Clear   GLUCOSEU Negative   BILIRUBINUR Negative   KETUA Negative   SPECGRAV 1.025   BLOODU SMALL*   PHUR 6.0   PROTEINU 30*   UROBILINOGEN 0.2   NITRU Negative   LEUKOCYTESUR Negative   LABMICR YES   URINETYPE NotGiven      Urine Microscopic:   Recent Labs     07/02/22  1304   HYALCAST 0-2   WBCUA 3-5   RBCUA 3-4   EPIU 11-20*     Urine Culture: No results for input(s): LABURIN in the last 72 hours. Urine Chemistry:   Recent Labs     07/02/22  1304 07/02/22  1456 07/03/22  1748   LABCREA  --  34.7 45.1   PROTEINUR  --   --  200.00*   NAUR 34 63  --              IMAGING:  CT ABDOMEN PELVIS WO CONTRAST Additional Contrast? None   Final Result      1. No findings for acute intra-abdominal or pelvic abnormality to explain patient's symptoms. 2.  Hepatic cyst.      3.  Small hiatal hernia. CT BIOPSY RENAL    (Results Pending)       Assessment/Plan   1. HTADDEUS - NSAID use     2. HTN    3. Anemia    4. Acid- base/ Electrolyte imbalance     5.  Possible GIB     PLAN   - start steroids for possible AIN   - Ur studies - 4 gms proteinuria  - check serology    - stop NSAID's   - dc IVF   - No obstruction       Recommend to dose adjust all medications  based on renal functions  Maintain SBP> 90 mmHg   Daily weights   AVOID NSAIDs  Avoid Nephrotoxins  Monitor Intake/Output  Call if significant decrease in urine output                 Thank you for allowing us to participate in care of Kate Edouard MD  Feel free to contact me   [facility] [address]  Office : [phone]  Fax :[phone]

## 2022-07-05 NOTE — CARE COORDINATION
Cm following, Pt from home ind with , getting renal Bx today and EGD with GI. Nephro following, renal function elevated. Pt unsure of needs at DC.  Electronically signed by Christian Wagner RN on 7/5/2022 at 4:13 PM   [phone]

## 2022-07-05 NOTE — PROGRESS NOTES
See my note    [address] [facility]  GI Progress Note          Denilson North is a 68 y.o. female patient. 1. THADDEUS (acute kidney injury) ([address]        Admit Date: 2022    Subjective:       Pt seen and examined at bedside. NAEO. This am, reports feeling well. HB stable 7.9. No Melena. ROS:  Cardiovascular ROS: no chest pain or dyspnea on exertion  Gastrointestinal ROS: no abdominal pain, change in bowel habits, or black or bloody stools  Respiratory ROS: no cough, shortness of breath, or wheezing    Scheduled Meds:   pantoprazole  40 mg IntraVENous BID    predniSONE  40 mg Oral Daily    [START ON 7/10/2022] atorvastatin  20 mg Oral Daily    sodium chloride flush  5-40 mL IntraVENous 2 times per day    lidocaine  1 patch TransDERmal Daily       Continuous Infusions:   sodium chloride         PRN Meds:  hydrOXYzine HCl, labetalol, melatonin, sodium chloride flush, sodium chloride, ondansetron **OR** ondansetron, polyethylene glycol, acetaminophen **OR** acetaminophen      Objective:       Patient Vitals for the past 24 hrs:   BP Temp Temp src Pulse Resp SpO2   22 0825 137/76 98 °F (36.7 °C) Oral 81 16 95 %   22 0318 (!) 148/77 98.2 °F (36.8 °C) Oral 80 15 94 %   22 0010 (!) 148/79 97.9 °F (36.6 °C) Oral 79 15 99 %   22 1946 124/74 98.3 °F (36.8 °C) Oral 89 16 97 %   22 1524 129/76 97.9 °F (36.6 °C) Oral 67 16 97 %       Exam:  VITALS:  /76   Pulse 81   Temp 98 °F (36.7 °C) (Oral)   Resp 16   Wt 121 lb 8 oz (55.1 kg)   SpO2 95%   BMI 23.73 kg/m²   TEMPERATURE:  Current - Temp: 98 °F (36.7 °C);  Max - Temp  Av.1 °F (36.7 °C)  Min: 97.9 °F (36.6 °C)  Max: 98.3 °F (36.8 °C)    NAD  General appearance: alert, appears stated age, cooperative and no distress  Head: Normocephalic, without obvious abnormality, atraumatic  Neck: supple, symmetrical, trachea midline and thyroid not enlarged, symmetric, no tenderness/mass/nodules  CVS:  RRR, Nl s1s2  Lungs CTA Bilaterally, normal effort  Abdomen: soft, non-tender; bowel sounds normal; no masses,  no organomegaly  AAOx3, No asterixis or encephalopathy  Extremities: No edema. Recent Labs     07/03/22 0546 07/03/22 1944 07/04/22 0618 07/04/22 2124 07/05/22 0528   WBC 4.2  --  4.0  --  4.2   HGB 8.2*   < > 7.7* 9.2* 7.9*   HCT 24.4*   < > 24.1* 28.7* 24.2*   MCV 84.8  --  85.8  --  85.4   *  --  100*  --  96*    < > = values in this interval not displayed. Recent Labs     07/03/22 0546 07/03/22 0546 07/04/22 0618 07/05/22 0528     --  138 140   K 4.3   < > 4.3 4.0  4.0   *  --  108 108   CO2 16*  --  23 23   PHOS  --   --   --  4.6   BUN 47*  --  38* 32*   CREATININE 3.8*  --  3.9* 4.2*    < > = values in this interval not displayed. No results for input(s): AST, ALT, ALB, BILIDIR, BILITOT, ALKPHOS in the last 72 hours. No results for input(s): LIPASE, AMYLASE in the last 72 hours. Recent Labs     07/05/22  1015   INR 1.17*     No results for input(s): PTT in the last 72 hours. No results for input(s): OCCULTBLD in the last 72 hours. 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 *      · UGI bleed likely from NSAIDs. No active bleeding. · Renal injury, ? NSAIDs.     Recommendations:       NPO  EGD today  D/C PPI infusion, started on Protonix 40 IV bid  Prednisone 40 po per lele Arthur MD  7/5/2022  11:03 AM

## 2022-07-05 NOTE — PROGRESS NOTES
Pre-operative History and Physical    Patient: Shereen Covarrubias  : 1945     History Obtained From:  patient, electronic medical record    HISTORY OF PRESENT ILLNESS:    The patient is a 68 y.o. female who presents for an EGD for melena. Past Medical History:        Diagnosis Date    THADDEUS (acute kidney injury) ([facility] [address] 2022    Bilateral carotid artery stenosis 2022    Bilateral carotid artery stenosis 2022    Encounter for cervical Pap smear with pelvic exam *Oct. 11,2019    Negative     Hyperlipidemia     Osteoporosis DEXA - 2011    Lumbar T score -2.5 and frm neck -1.8    Screening mammogram for high-risk patient November 10, 2011    Negative    Screening mammogram, encounter for *2017    Negative    Screening mammogram, encounter for *2018    Negative    Screening mammogram, encounter for *2019    Negative    Screening mammogram, encounter for *Leidy 15, 2020    Negative    Vitamin D deficiency     Wrist fracture, left Mar., 2016    Left     Past Surgical History:        Procedure Laterality Date    COLONOSCOPY  Dec., 2002 (  )    Dr. Yaya Catalan - normal.    COLONOSCOPY  Oct., 2013 (  )    Andrew Cotton - Normal    CYSTOSCOPY  *May 14, 2020    Dr. Jennifer Loaiza - negative     Medications Prior to Admission:   No current facility-administered medications on file prior to encounter. Current Outpatient Medications on File Prior to Encounter   Medication Sig Dispense Refill    aspirin 81 MG EC tablet Take 81 mg by mouth daily (Patient not taking: Reported on 2022)      pantoprazole (PROTONIX) 40 MG tablet Take 1 tablet by mouth daily (with breakfast) (Patient not taking: Reported on 2022) 30 tablet 3    atorvastatin (LIPITOR) 20 MG tablet TAKE ONE TABLET BY MOUTH DAILY 90 tablet 3    Calcium Carbonate-Vitamin D (CALCIUM + D) 600-200 MG-UNIT TABS Take  by mouth 2 times daily.    (Patient not taking: Reported on 2022) Allergies:  Patient has no known allergies. History of allergic reaction to anesthesia:  No    Social History:   TOBACCO:   reports that she has never smoked. She has never used smokeless tobacco.  ETOH:   reports current alcohol use. DRUGS:   has no history on file for drug use. Family History:       Problem Relation Age of Onset    Hypertension Father 80        , hypertension, facial tumor.  Cancer Mother 80        , carcinoma of the lung.  Cancer Brother         prostate    Breast Cancer Daughter 39       PHYSICAL EXAM:      /78   Pulse 79   Temp 98 °F (36.7 °C) (Temporal)   Resp 16   Wt 121 lb 8 oz (55.1 kg)   SpO2 95%   BMI 23.73 kg/m²  I        Heart:  No m/r/g +s1/s2 RRR    Lungs:  CTA bilaterally    Abdomen:  Soft, nontender, non distended; +bs    ASA Grade:  ASA 3 - Patient with moderate systemic disease with functional limitations    Mallampati Class:  Class I: Soft palate, uvula, fauces, pillars visible  _____x_____  Class II: Soft palate, uvula, fauces visible  __________   Class III: Soft palate, base of uvula visible  __________  Class IV: Hard palate only visible   __________      ASSESSMENT AND PLAN:    1. Patient is a 68 y.o. female here for EGD with deep sedation  2. Procedure options, risks and benefits reviewed with patient. We specifically discussed that risks include, but are not limited to infection, bleeding, perforation, death, and missed lesions. Patient expresses understanding.

## 2022-07-05 NOTE — PROGRESS NOTES
Patient alert and oriented. Vitals stable, afebrile. Denies pain, or nausea. IV right wrist/hand saline locked. Magnesium 1 G given as ordered. Patient up as tolerated, gate steady. Urine clear yellow. Consent signed for both EGD, and renal biopsy. Patient left floor for IR, to have renal biopsy.

## 2022-07-05 NOTE — ANESTHESIA PRE PROCEDURE
Department of Anesthesiology  Preprocedure Note       Name:  Louisa Mcardle   Age:  68 y.o.  :  1945                                          MRN:  [id number]         Date:  2022      Surgeon: Richelle Sepulveda):  Tita Olea MD    Procedure: Procedure(s):  EGD DIAGNOSTIC ONLY    Medications prior to admission:   Prior to Admission medications    Medication Sig Start Date End Date Taking? Authorizing Provider   aspirin 81 MG EC tablet Take 81 mg by mouth daily  Patient not taking: Reported on 2022    Historical Provider, MD   pantoprazole (PROTONIX) 40 MG tablet Take 1 tablet by mouth daily (with breakfast)  Patient not taking: Reported on 2022 6/15/22   Nancy Groves MD   atorvastatin (LIPITOR) 20 MG tablet TAKE ONE TABLET BY MOUTH DAILY 21   Nancy Groves MD   Calcium Carbonate-Vitamin D (CALCIUM + D) 600-200 MG-UNIT TABS Take  by mouth 2 times daily.     Patient not taking: Reported on 2022    Historical Provider, MD       Current medications:    Current Facility-Administered Medications   Medication Dose Route Frequency Provider Last Rate Last Admin    hydrOXYzine HCl (ATARAX) tablet 10 mg  10 mg Oral TID PRN Susan Torres MD        pantoprazole (PROTONIX) injection 40 mg  40 mg IntraVENous BID Nir Bach MD   40 mg at 22 8641    predniSONE (DELTASONE) tablet 40 mg  40 mg Oral Daily Mary Chand MD   40 mg at 22 3141    labetalol (NORMODYNE;TRANDATE) injection 10 mg  10 mg IntraVENous Q6H PRN Heri Stephen MD        melatonin tablet 6 mg  6 mg Oral Nightly PRN MD Warden Heath Norman ON 7/10/2022] atorvastatin (LIPITOR) tablet 20 mg  20 mg Oral Daily Susan Torres MD        sodium chloride flush 0.9 % injection 5-40 mL  5-40 mL IntraVENous 2 times per day Susan Torres MD   10 mL at 22    sodium chloride flush 0.9 % injection 5-40 mL  5-40 mL IntraVENous PRN Susan Torres MD        0.9 % sodium chloride infusion IntraVENous PRN Damian Saul MD        ondansetron (ZOFRAN-ODT) disintegrating tablet 4 mg  4 mg Oral Q8H PRN Damian Saul MD        Or    ondansetron (ZOFRAN) injection 4 mg  4 mg IntraVENous Q6H PRN Damian Saul MD   4 mg at 07/03/22 2032    polyethylene glycol (GLYCOLAX) packet 17 g  17 g Oral Daily PRN Damian Saul MD        acetaminophen (TYLENOL) tablet 650 mg  650 mg Oral Q6H PRN Damian Saul MD        Or    acetaminophen (TYLENOL) suppository 650 mg  650 mg Rectal Q6H PRN Damian Saul MD        lidocaine 4 % external patch 1 patch  1 patch TransDERmal Daily Frances Holt MD           Allergies:  No Known Allergies    Problem List:    Patient Active Problem List   Diagnosis Code    Hyperlipidemia E78.5    Adenomatous polyp of sigmoid colon D12.5    Nocturia R35.1    Visual loss, right eye H54.61    Heart murmur R01.1    Bilateral carotid artery stenosis I65.23    Gastritis K29.70    Other fatigue R53.83    Epigastric pain R10.13    THADDEUS (acute kidney injury) ([facility] [address] N17.9    Anemia D64.9    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 11,2019    Negative     Hyperlipidemia     Osteoporosis DEXA - Sept., 2011    Lumbar T score -2.5 and frm neck -1.8    Screening mammogram for high-risk patient November 10, 2011    Negative    Screening mammogram, encounter for *March 8, 2017    Negative    Screening mammogram, encounter for *March 14, 2018    Negative    Screening mammogram, encounter for *March 20, 2019    Negative    Screening mammogram, encounter for *Leidy 15, 2020    Negative    Vitamin D deficiency     Wrist fracture, left Mar., 2016    Left       Past Surgical History:        Procedure Laterality Date    COLONOSCOPY  Dec., 2002 ( 2012 )    Dr. Yariel Rider - normal.    COLONOSCOPY  Oct., 2013 ( 2023 )    Divya Sarah - Normal    CYSTOSCOPY  *May 14, 2020    Dr. Maikol Ceballos - negative       Social History:    Social History     Tobacco Use    Smoking status: Never Smoker    Smokeless tobacco: Never Used   Substance Use Topics    Alcohol use: Yes     Alcohol/week: 0.0 standard drinks     Comment: occasional wine. Counseling given: Not Answered      Vital Signs (Current):   Vitals:    07/05/22 1159 07/05/22 1204 07/05/22 1209 07/05/22 1210   BP: (!) 143/64 (!) 143/64 132/69 135/68   Pulse: 86 85 86 87   Resp:       Temp:       TempSrc:       SpO2: 91% 92% 96% 94%   Weight:                                                  BP Readings from Last 3 Encounters:   07/05/22 135/68   07/01/22 122/78   06/15/22 120/80       NPO Status:                                                                                 BMI:   Wt Readings from Last 3 Encounters:   07/02/22 121 lb 8 oz (55.1 kg)   07/01/22 121 lb (54.9 kg)   06/15/22 127 lb 6.4 oz (57.8 kg)     Body mass index is 23.73 kg/m².     CBC:   Lab Results   Component Value Date/Time    WBC 4.2 07/05/2022 05:28 AM    RBC 2.83 07/05/2022 05:28 AM    HGB 7.9 07/05/2022 05:28 AM    HCT 24.2 07/05/2022 05:28 AM    MCV 85.4 07/05/2022 05:28 AM    RDW 14.6 07/05/2022 05:28 AM    PLT 96 07/05/2022 05:28 AM       CMP:   Lab Results   Component Value Date/Time     07/05/2022 05:28 AM    K 4.0 07/05/2022 05:28 AM    K 4.0 07/05/2022 05:28 AM     07/05/2022 05:28 AM    CO2 23 07/05/2022 05:28 AM    BUN 32 07/05/2022 05:28 AM    CREATININE 4.2 07/05/2022 05:28 AM    GFRAA 12 07/05/2022 05:28 AM    GFRAA >60 07/09/2012 07:51 AM    AGRATIO 2.0 07/01/2022 04:06 PM    LABGLOM 10 07/05/2022 05:28 AM    GLUCOSE 94 07/05/2022 05:28 AM    PROT 7.1 07/01/2022 04:06 PM    PROT 6.5 07/09/2012 07:51 AM    CALCIUM 9.5 07/05/2022 05:28 AM    BILITOT 0.3 07/01/2022 04:06 PM    ALKPHOS 92 07/01/2022 04:06 PM    AST 15 07/01/2022 04:06 PM    ALT 7 07/01/2022 04:06 PM       POC Tests: No results for input(s): POCGLU, POCNA, POCK, POCCL, POCBUN, POCHEMO, POCHCT in the last 72 hours. Coags:   Lab Results   Component Value Date/Time    PROTIME 14.9 07/05/2022 10:15 AM    INR 1.17 07/05/2022 10:15 AM       HCG (If Applicable): No results found for: PREGTESTUR, PREGSERUM, HCG, HCGQUANT     ABGs: No results found for: PHART, PO2ART, WEM1NOO, CHS0CDN, BEART, B9FCNYIM     Type & Screen (If Applicable):  No results found for: LABABO, LABRH    Drug/Infectious Status (If Applicable):  No results found for: HIV, HEPCAB    COVID-19 Screening (If Applicable):   Lab Results   Component Value Date/Time    COVID19 NOT DETECTED 12/21/2020 07:50 PM           Anesthesia Evaluation  Patient summary reviewed and Nursing notes reviewed no history of anesthetic complications:   Airway: Mallampati: I  TM distance: >3 FB   Neck ROM: full  Mouth opening: > = 3 FB   Dental: normal exam         Pulmonary: breath sounds clear to auscultation      (-) not a current smoker                           Cardiovascular:  Exercise tolerance: good (>4 METS),           Rhythm: regular  Rate: normal                    Neuro/Psych:   Negative Neuro/Psych ROS              GI/Hepatic/Renal:   (+) renal disease: ARF,           Endo/Other:    (+) blood dyscrasia: thrombocytopenia and anemia:., .                 Abdominal:             Vascular: negative vascular ROS. Other Findings:           Anesthesia Plan      MAC     ASA 3       Induction: intravenous. MIPS: Prophylactic antiemetics administered. Anesthetic plan and risks discussed with patient. Plan discussed with CRNA.                     Jorge Carpenter DO   7/5/2022

## 2022-07-05 NOTE — PROCEDURES
EGD REPORT    Patient:  Sharyn Kulkarni                  1945    Referring Physician:  Joyce Bee    Endoscopist: Cristina     Indication:  Melena     Medications:  MAC      Pre-Anesthesia Assessment:  I have reviewed and am in agreement with patient history and medication, including previous response to sedation. Prior to the procedure, a History and Physical was performed, and patient medications and allergies were reviewed. The patient is competent. The risks and benefits of the procedure and the sedation options and risks were discussed with the patient. Risks discussed included but were not limited to infection, bleeding, perforation, death, and missed lesions. All questions were answered and informed consent was obtained. Patient identification and proposed procedure were verified by the physician and the nurse in the pre-procedure area in the procedure room. Mallampatti: I  ASA Grade Assessment: 3     After reviewing the risks and benefits, the patient was deemed in satisfactory condition to undergo the procedure. The anesthesia plan was to use MAC anesthesia. Immediately prior to administration of medications, the patient was re-assessed for adequacy to receive sedatives and a time out was performed. Patient and healthcare providers were in agreement it was the correct patient and procedure. The heart rate, respiratory rate, oxygen saturations, blood pressure, adequacy of pulmonary ventilation, and response to care were monitored throughout the procedure. The physical status of the patient was re-assessed after the procedure. After obtaining informed consent, the endoscope was passed under direct vision. The endoscope was introduced through the mouth, and advanced to the second part of duodenum. The EGD was accomplished without difficulty. The patient tolerated the procedure well.        Duodenum: Normal; 6 biopsies including two from the duodenal bulb obtained  Stomach: Normal. Biopsies obtained to rule out malabsorptive causes. Retroflexion did show a small hiatal hernia without Jeff's erosions. Esophagus: GE junction without evidence of Munoz's or esophagitis.     Estimated blood loss none    Plan:  The patient knows it is their responsibility to call for biopsy results in 7 days  Will do colonoscopy tomorrow done

## 2022-07-06 ENCOUNTER — ANESTHESIA (OUTPATIENT)
Dept: ENDOSCOPY | Age: 77
DRG: 841 | End: 2022-07-06
Payer: MEDICARE

## 2022-07-06 ENCOUNTER — ANESTHESIA EVENT (OUTPATIENT)
Dept: ENDOSCOPY | Age: 77
DRG: 841 | End: 2022-07-06
Payer: MEDICARE

## 2022-07-06 PROBLEM — C90.00 MULTIPLE MYELOMA (HCC): Status: ACTIVE | Noted: 2022-07-06

## 2022-07-06 LAB
ANION GAP SERPL CALCULATED.3IONS-SCNC: 15 MMOL/L (ref 3–16)
BASOPHILS ABSOLUTE: 0 K/UL (ref 0–0.2)
BASOPHILS RELATIVE PERCENT: 0.2 %
BUN BLDV-MCNC: 37 MG/DL (ref 7–20)
C-REACTIVE PROTEIN: 5.6 MG/L (ref 0–5.1)
C3 COMPLEMENT: 113 MG/DL (ref 90–180)
C4 COMPLEMENT: 46.6 MG/DL (ref 10–40)
CALCIUM SERPL-MCNC: 9.5 MG/DL (ref 8.3–10.6)
CHLORIDE BLD-SCNC: 99 MMOL/L (ref 99–110)
CO2: 23 MMOL/L (ref 21–32)
CREAT SERPL-MCNC: 4.2 MG/DL (ref 0.6–1.2)
EOSINOPHILS ABSOLUTE: 0 K/UL (ref 0–0.6)
EOSINOPHILS RELATIVE PERCENT: 0.3 %
GFR AFRICAN AMERICAN: 12
GFR NON-AFRICAN AMERICAN: 10
GLUCOSE BLD-MCNC: 118 MG/DL (ref 70–99)
HCT VFR BLD CALC: 25.2 % (ref 36–48)
HEMOGLOBIN: 8.1 G/DL (ref 12–16)
IGA: 50 MG/DL (ref 70–400)
IGG: 291 MG/DL (ref 700–1600)
IGM: 25 MG/DL (ref 40–230)
LYMPHOCYTES ABSOLUTE: 0.9 K/UL (ref 1–5.1)
LYMPHOCYTES RELATIVE PERCENT: 14.4 %
MAGNESIUM: 2.1 MG/DL (ref 1.8–2.4)
MCH RBC QN AUTO: 27.5 PG (ref 26–34)
MCHC RBC AUTO-ENTMCNC: 32.1 G/DL (ref 31–36)
MCV RBC AUTO: 85.8 FL (ref 80–100)
MONOCYTES ABSOLUTE: 0.5 K/UL (ref 0–1.3)
MONOCYTES RELATIVE PERCENT: 8.4 %
NEUTROPHILS ABSOLUTE: 4.9 K/UL (ref 1.7–7.7)
NEUTROPHILS RELATIVE PERCENT: 76.7 %
PDW BLD-RTO: 14.7 % (ref 12.4–15.4)
PLATELET # BLD: 93 K/UL (ref 135–450)
PMV BLD AUTO: 8.1 FL (ref 5–10.5)
POTASSIUM REFLEX MAGNESIUM: 4.2 MMOL/L (ref 3.5–5.1)
RBC # BLD: 2.93 M/UL (ref 4–5.2)
SEDIMENTATION RATE, ERYTHROCYTE: 89 MM/HR (ref 0–30)
SODIUM BLD-SCNC: 137 MMOL/L (ref 136–145)
WBC # BLD: 6.4 K/UL (ref 4–11)

## 2022-07-06 PROCEDURE — 7100000010 HC PHASE II RECOVERY - FIRST 15 MIN: Performed by: INTERNAL MEDICINE

## 2022-07-06 PROCEDURE — 3609010200 HC COLONOSCOPY ABLATION TUMOR POLYP/OTHER LES: Performed by: INTERNAL MEDICINE

## 2022-07-06 PROCEDURE — 2060000000 HC ICU INTERMEDIATE R&B

## 2022-07-06 PROCEDURE — 2580000003 HC RX 258: Performed by: INTERNAL MEDICINE

## 2022-07-06 PROCEDURE — 84165 PROTEIN E-PHORESIS SERUM: CPT

## 2022-07-06 PROCEDURE — 3700000001 HC ADD 15 MINUTES (ANESTHESIA): Performed by: INTERNAL MEDICINE

## 2022-07-06 PROCEDURE — 88305 TISSUE EXAM BY PATHOLOGIST: CPT

## 2022-07-06 PROCEDURE — 99233 SBSQ HOSP IP/OBS HIGH 50: CPT | Performed by: INTERNAL MEDICINE

## 2022-07-06 PROCEDURE — 7100000011 HC PHASE II RECOVERY - ADDTL 15 MIN: Performed by: INTERNAL MEDICINE

## 2022-07-06 PROCEDURE — 6370000000 HC RX 637 (ALT 250 FOR IP): Performed by: INTERNAL MEDICINE

## 2022-07-06 PROCEDURE — 6360000002 HC RX W HCPCS: Performed by: NURSE ANESTHETIST, CERTIFIED REGISTERED

## 2022-07-06 PROCEDURE — 0DBN8ZZ EXCISION OF SIGMOID COLON, VIA NATURAL OR ARTIFICIAL OPENING ENDOSCOPIC: ICD-10-PCS | Performed by: INTERNAL MEDICINE

## 2022-07-06 PROCEDURE — 2580000003 HC RX 258: Performed by: ANESTHESIOLOGY

## 2022-07-06 PROCEDURE — 2709999900 HC NON-CHARGEABLE SUPPLY: Performed by: INTERNAL MEDICINE

## 2022-07-06 PROCEDURE — 3609010600 HC COLONOSCOPY POLYPECTOMY SNARE/COLD BIOPSY: Performed by: INTERNAL MEDICINE

## 2022-07-06 PROCEDURE — 84155 ASSAY OF PROTEIN SERUM: CPT

## 2022-07-06 PROCEDURE — 85652 RBC SED RATE AUTOMATED: CPT

## 2022-07-06 PROCEDURE — 80048 BASIC METABOLIC PNL TOTAL CA: CPT

## 2022-07-06 PROCEDURE — 2580000003 HC RX 258: Performed by: NURSE ANESTHETIST, CERTIFIED REGISTERED

## 2022-07-06 PROCEDURE — 85025 COMPLETE CBC W/AUTO DIFF WBC: CPT

## 2022-07-06 PROCEDURE — 82595 ASSAY OF CRYOGLOBULIN: CPT

## 2022-07-06 PROCEDURE — 82784 ASSAY IGA/IGD/IGG/IGM EACH: CPT

## 2022-07-06 PROCEDURE — 0D5H8ZZ DESTRUCTION OF CECUM, VIA NATURAL OR ARTIFICIAL OPENING ENDOSCOPIC: ICD-10-PCS | Performed by: INTERNAL MEDICINE

## 2022-07-06 PROCEDURE — 86140 C-REACTIVE PROTEIN: CPT

## 2022-07-06 PROCEDURE — 3700000000 HC ANESTHESIA ATTENDED CARE: Performed by: INTERNAL MEDICINE

## 2022-07-06 PROCEDURE — 83735 ASSAY OF MAGNESIUM: CPT

## 2022-07-06 PROCEDURE — 83516 IMMUNOASSAY NONANTIBODY: CPT

## 2022-07-06 PROCEDURE — 36415 COLL VENOUS BLD VENIPUNCTURE: CPT

## 2022-07-06 PROCEDURE — 2500000003 HC RX 250 WO HCPCS: Performed by: NURSE ANESTHETIST, CERTIFIED REGISTERED

## 2022-07-06 RX ORDER — SODIUM CHLORIDE 9 MG/ML
INJECTION, SOLUTION INTRAVENOUS PRN
Status: DISCONTINUED | OUTPATIENT
Start: 2022-07-06 | End: 2022-07-07 | Stop reason: HOSPADM

## 2022-07-06 RX ORDER — SODIUM CHLORIDE 0.9 % (FLUSH) 0.9 %
5-40 SYRINGE (ML) INJECTION PRN
Status: DISCONTINUED | OUTPATIENT
Start: 2022-07-06 | End: 2022-07-07 | Stop reason: HOSPADM

## 2022-07-06 RX ORDER — SODIUM CHLORIDE 9 MG/ML
INJECTION, SOLUTION INTRAVENOUS CONTINUOUS
Status: DISCONTINUED | OUTPATIENT
Start: 2022-07-06 | End: 2022-07-07 | Stop reason: HOSPADM

## 2022-07-06 RX ORDER — SODIUM CHLORIDE 0.9 % (FLUSH) 0.9 %
5-40 SYRINGE (ML) INJECTION EVERY 12 HOURS SCHEDULED
Status: DISCONTINUED | OUTPATIENT
Start: 2022-07-06 | End: 2022-07-07 | Stop reason: HOSPADM

## 2022-07-06 RX ORDER — LIDOCAINE HYDROCHLORIDE 20 MG/ML
INJECTION, SOLUTION EPIDURAL; INFILTRATION; INTRACAUDAL; PERINEURAL PRN
Status: DISCONTINUED | OUTPATIENT
Start: 2022-07-06 | End: 2022-07-06 | Stop reason: SDUPTHER

## 2022-07-06 RX ORDER — SODIUM CHLORIDE, SODIUM LACTATE, POTASSIUM CHLORIDE, CALCIUM CHLORIDE 600; 310; 30; 20 MG/100ML; MG/100ML; MG/100ML; MG/100ML
INJECTION, SOLUTION INTRAVENOUS CONTINUOUS PRN
Status: DISCONTINUED | OUTPATIENT
Start: 2022-07-06 | End: 2022-07-06 | Stop reason: SDUPTHER

## 2022-07-06 RX ORDER — PROPOFOL 10 MG/ML
INJECTION, EMULSION INTRAVENOUS CONTINUOUS PRN
Status: DISCONTINUED | OUTPATIENT
Start: 2022-07-06 | End: 2022-07-06 | Stop reason: SDUPTHER

## 2022-07-06 RX ORDER — PROPOFOL 10 MG/ML
INJECTION, EMULSION INTRAVENOUS PRN
Status: DISCONTINUED | OUTPATIENT
Start: 2022-07-06 | End: 2022-07-06 | Stop reason: SDUPTHER

## 2022-07-06 RX ADMIN — SODIUM CHLORIDE, SODIUM LACTATE, POTASSIUM CHLORIDE, AND CALCIUM CHLORIDE: .6; .31; .03; .02 INJECTION, SOLUTION INTRAVENOUS at 13:51

## 2022-07-06 RX ADMIN — POLYETHYLENE GLYCOL 3350, SODIUM SULFATE ANHYDROUS, SODIUM BICARBONATE, SODIUM CHLORIDE, POTASSIUM CHLORIDE 2000 ML: 236; 22.74; 6.74; 5.86; 2.97 POWDER, FOR SOLUTION ORAL at 01:55

## 2022-07-06 RX ADMIN — PROPOFOL 100 MCG/KG/MIN: 10 INJECTION, EMULSION INTRAVENOUS at 13:55

## 2022-07-06 RX ADMIN — LIDOCAINE HYDROCHLORIDE 50 MG: 20 INJECTION, SOLUTION EPIDURAL; INFILTRATION; INTRACAUDAL; PERINEURAL at 13:55

## 2022-07-06 RX ADMIN — SODIUM CHLORIDE, PRESERVATIVE FREE 10 ML: 5 INJECTION INTRAVENOUS at 08:09

## 2022-07-06 RX ADMIN — PROPOFOL 50 MG: 10 INJECTION, EMULSION INTRAVENOUS at 13:55

## 2022-07-06 RX ADMIN — PROPOFOL 30 MG: 10 INJECTION, EMULSION INTRAVENOUS at 14:12

## 2022-07-06 RX ADMIN — PROPOFOL 50 MG: 10 INJECTION, EMULSION INTRAVENOUS at 13:58

## 2022-07-06 RX ADMIN — SODIUM CHLORIDE, PRESERVATIVE FREE 10 ML: 5 INJECTION INTRAVENOUS at 23:16

## 2022-07-06 RX ADMIN — PREDNISONE 40 MG: 20 TABLET ORAL at 08:08

## 2022-07-06 RX ADMIN — SODIUM CHLORIDE: 9 INJECTION, SOLUTION INTRAVENOUS at 20:33

## 2022-07-06 ASSESSMENT — PAIN - FUNCTIONAL ASSESSMENT: PAIN_FUNCTIONAL_ASSESSMENT: NONE - DENIES PAIN

## 2022-07-06 ASSESSMENT — LIFESTYLE VARIABLES: SMOKING_STATUS: 0

## 2022-07-06 NOTE — PROGRESS NOTES
Nephrology Progress Note                                                                                                                                                                                                                                                                                                                                                               Office : [phone]     Fax :[phone]              Patient's Name: Bereket Garcia      22    Cr yesterday from 3.9 to 4.2. Today's creatinine pending         Past Medical History:   Diagnosis Date    THADDEUS (acute kidney injury) ([facility] [address] 2022    Bilateral carotid artery stenosis 2022    Bilateral carotid artery stenosis 2022    Encounter for cervical Pap smear with pelvic exam *Oct. 11,2019    Negative     Hyperlipidemia     Osteoporosis DEXA - 2011    Lumbar T score -2.5 and frm neck -1.8    Screening mammogram for high-risk patient November 10, 2011    Negative    Screening mammogram, encounter for *2017    Negative    Screening mammogram, encounter for *2018    Negative    Screening mammogram, encounter for *2019    Negative    Screening mammogram, encounter for *Leidy 15, 2020    Negative    Vitamin D deficiency     Wrist fracture, left Mar., 2016    Left       Past Surgical History:   Procedure Laterality Date    COLONOSCOPY  Dec., 2002 (  )    Dr. Awilda Ardon - normal.    COLONOSCOPY  Oct., 2013 (  )     - Normal    CT BIOPSY RENAL  2022    CT BIOPSY RENAL 2022 [facility] CT SCAN    CYSTOSCOPY  *May 14, 2020    Dr. Rafia Robbins - negative    UPPER GASTROINTESTINAL ENDOSCOPY N/A 2022    EGD BIOPSY performed by Julian Troy MD at [facility] ENDOSCOPY       Family History   Problem Relation Age of Onset    Hypertension Father 80        , hypertension, facial tumor.  Cancer Mother 80        , carcinoma of the lung.      Cancer Brother 07/04/22  0618 07/05/22  0528   CALCIUM 9.6 9.5   MG 1.80 1.70*   PHOS  --  4.6     Hepatic:   No results for input(s): AST, ALT, ALB, BILITOT, ALKPHOS in the last 72 hours. Troponin: No results for input(s): TROPONINI in the last 72 hours. BNP: No results for input(s): BNP in the last 72 hours. Lipids: No results for input(s): CHOL, TRIG, HDL, LDLCALC, LABVLDL in the last 72 hours. ABGs: No results for input(s): PHART, PO2ART, NFK5EPZ in the last 72 hours. INR:   Recent Labs     07/05/22  1015   INR 1.17*     UA:  No results for input(s): COLORU, CLARITYU, GLUCOSEU, BILIRUBINUR, KETUA, SPECGRAV, BLOODU, PHUR, PROTEINU, UROBILINOGEN, NITRU, LEUKOCYTESUR, Terrea Billet in the last 72 hours. Urine Microscopic:   No results for input(s): LABCAST, BACTERIA, COMU, HYALCAST, WBCUA, RBCUA, EPIU in the last 72 hours. Urine Culture: No results for input(s): LABURIN in the last 72 hours. Urine Chemistry:   Recent Labs     07/03/22  1748   LABCREA 45.1   PROTEINUR 200.00*             IMAGING:  CT BIOPSY RENAL   Final Result   Impression:   Successful image guided nontarget renal biopsy as described above. CT ABDOMEN PELVIS WO CONTRAST Additional Contrast? None   Final Result      1. No findings for acute intra-abdominal or pelvic abnormality to explain patient's symptoms. 2.  Hepatic cyst.      3.  Small hiatal hernia. Assessment/Plan   1. THADDEUS 2/2 NSAID use   - stop all NSAIDs    2. HTN    3. Anemia    4. Acid- base/ Electrolyte imbalance     5. Possible GIB   - Colonoscopy today    6.  Myeloma, pending biopsy  - Oncology following      PLAN   - Ur studies - 4 gms proteinuria  - Serology pending  - stop NSAID's   - No obstruction   - Renal biopsy, results pending      Recommend to dose adjust all medications  based on renal functions  Maintain SBP> 90 mmHg   Daily weights   AVOID NSAIDs  Avoid Nephrotoxins  Monitor Intake/Output  Call if significant decrease in urine output           Thank you for allowing us to participate in care of Jimmy Perry, DO PGY-3      Feel free to contact me   [facility] [address]  Office : [phone]  Fax :[phone]

## 2022-07-06 NOTE — PLAN OF CARE
Problem: Pain  Goal: Verbalizes/displays adequate comfort level or baseline comfort level  Outcome: Progressing  Note: Pt denying pain/ discomfort throughout shift.

## 2022-07-06 NOTE — PROGRESS NOTES
Breast Cancer Daughter 39        reports that she has never smoked. She has never used smokeless tobacco. She reports current alcohol use. Allergies:  Patient has no known allergies. Current Medications:    hydrOXYzine HCl (ATARAX) tablet 10 mg, TID PRN  predniSONE (DELTASONE) tablet 40 mg, Daily  labetalol (NORMODYNE;TRANDATE) injection 10 mg, Q6H PRN  melatonin tablet 6 mg, Nightly PRN  [START ON 7/10/2022] atorvastatin (LIPITOR) tablet 20 mg, Daily  sodium chloride flush 0.9 % injection 5-40 mL, 2 times per day  sodium chloride flush 0.9 % injection 5-40 mL, PRN  0.9 % sodium chloride infusion, PRN  ondansetron (ZOFRAN-ODT) disintegrating tablet 4 mg, Q8H PRN   Or  ondansetron (ZOFRAN) injection 4 mg, Q6H PRN  polyethylene glycol (GLYCOLAX) packet 17 g, Daily PRN  acetaminophen (TYLENOL) tablet 650 mg, Q6H PRN   Or  acetaminophen (TYLENOL) suppository 650 mg, Q6H PRN  lidocaine 4 % external patch 1 patch, Daily        Review of Systems:   14 point ROS obtained but were negative except mentioned in HPI      Physical exam:     Vitals:  /76   Pulse 82   Temp 97.5 °F (36.4 °C) (Oral)   Resp 16   Wt 121 lb 8 oz (55.1 kg)   SpO2 97%   BMI 23.73 kg/m²   Constitutional:  OAA X3 NAD  Skin: no rash, turgor wnl  Heent:  eomi, mmm  Neck: no bruits or jvd noted  Cardiovascular:  S1, S2 without m/r/g  Respiratory: CTA B without w/r/r  Abdomen:  +bs, soft, nt, nd  Ext: + lower extremity edema  Psychiatric: mood and affect appropriate  Musculoskeletal:  Rom, muscular strength intact    Data:   Labs:  CBC:   Recent Labs     07/04/22  0618 07/04/22  0618 07/04/22 2124 07/05/22  0528 07/05/22  1805   WBC 4.0  --   --  4.2  --    HGB 7.7*   < > 9.2* 7.9* 8.7*   *  --   --  96*  --     < > = values in this interval not displayed.      BMP:    Recent Labs     07/04/22  0618 07/05/22  0528    140   K 4.3 4.0  4.0    108   CO2 23 23   BUN 38* 32*   CREATININE 3.9* 4.2*   GLUCOSE 91 94 Ca/Mg/Phos:   Recent Labs     07/04/22  0618 07/05/22  0528   CALCIUM 9.6 9.5   MG 1.80 1.70*   PHOS  --  4.6     Hepatic:   No results for input(s): AST, ALT, ALB, BILITOT, ALKPHOS in the last 72 hours. Troponin: No results for input(s): TROPONINI in the last 72 hours. BNP: No results for input(s): BNP in the last 72 hours. Lipids: No results for input(s): CHOL, TRIG, HDL, LDLCALC, LABVLDL in the last 72 hours. ABGs: No results for input(s): PHART, PO2ART, FVU0PPR in the last 72 hours. INR:   Recent Labs     07/05/22  1015   INR 1.17*     UA:  No results for input(s): COLORU, CLARITYU, GLUCOSEU, BILIRUBINUR, KETUA, SPECGRAV, BLOODU, PHUR, PROTEINU, UROBILINOGEN, NITRU, LEUKOCYTESUR, Lina Room in the last 72 hours. Urine Microscopic:   No results for input(s): LABCAST, BACTERIA, COMU, HYALCAST, WBCUA, RBCUA, EPIU in the last 72 hours. Urine Culture: No results for input(s): LABURIN in the last 72 hours. Urine Chemistry:   Recent Labs     07/03/22  1748   LABCREA 45.1   PROTEINUR 200.00*             IMAGING:  CT BIOPSY RENAL   Final Result   Impression:   Successful image guided nontarget renal biopsy as described above. CT ABDOMEN PELVIS WO CONTRAST Additional Contrast? None   Final Result      1. No findings for acute intra-abdominal or pelvic abnormality to explain patient's symptoms. 2.  Hepatic cyst.      3.  Small hiatal hernia. Assessment/Plan   1. THADDEUS - NSAID use     2. HTN    3. Anemia    4. Acid- base/ Electrolyte imbalance     5.  Possible GIB     PLAN   - renal bx done   - FLC ratio - 0   - hematology consult   - Ur studies - 4 gms proteinuria  - check serology    - stop NSAID's   - dc IVF   - No obstruction       Recommend to dose adjust all medications  based on renal functions  Maintain SBP> 90 mmHg   Daily weights   AVOID NSAIDs  Avoid Nephrotoxins  Monitor Intake/Output  Call if significant decrease in urine output                 Thank you for allowing us to participate in care of Bianka Tate MD  Feel free to contact me   [facility] [address]  Office : [phone]  Fax :[phone]

## 2022-07-06 NOTE — DISCHARGE INSTR - COC
Patient Name: Clementine Saeed   :  1945  MRN:  [id number]    Admit date:  2022  Discharge date:  22      Code Status Order: Full Code     Admitting Physician:  Dre Ribera MD  PCP: Sandra Main MD    [facility]00[facility]-32  Discharging Unit Phone Number: [phone]        Bone Marrow Biopsy Site: Do not get site wet for 24 hours. Band aid may be removed in 24 hours. Do not take baths until cleared by Dr. Juan David Graham.

## 2022-07-06 NOTE — PROGRESS NOTES
Pt alert & oriented x4, BP elevated, other vitals stable. Plan for colonoscopy in AM, Pt drinking GoLYTELY prep and having multiple bowel movements overnight. Pt up independent in room w/ steady gait. Pt denying pain or any other needs at this time. Call light and bedside table within reach.

## 2022-07-06 NOTE — PROGRESS NOTES
Pre-operative History and Physical    Patient: Viktoria Milton  : 1945     History Obtained From:  patient, electronic medical record    HISTORY OF PRESENT ILLNESS:    The patient is a 68 y.o. female who presents for a colonoscopy for GI bleed. Past Medical History:        Diagnosis Date    THADDEUS (acute kidney injury) ([facility] [address] 2022    Bilateral carotid artery stenosis 2022    Bilateral carotid artery stenosis 2022    Encounter for cervical Pap smear with pelvic exam *Oct. 11,2019    Negative     Hyperlipidemia     Osteoporosis DEXA - 2011    Lumbar T score -2.5 and frm neck -1.8    Screening mammogram for high-risk patient November 10, 2011    Negative    Screening mammogram, encounter for *2017    Negative    Screening mammogram, encounter for *2018    Negative    Screening mammogram, encounter for *2019    Negative    Screening mammogram, encounter for *Leidy 15, 2020    Negative    Vitamin D deficiency     Wrist fracture, left Mar., 2016    Left     Past Surgical History:        Procedure Laterality Date    COLONOSCOPY  Dec., 2002 (  )    Dr. Evelina carrasco.    COLONOSCOPY  Oct., 2013 (  )     - Anabella    CT BIOPSY RENAL  2022    CT BIOPSY RENAL 2022 [facility] CT SCAN    CYSTOSCOPY  *May 14, 2020    Dr. Domenico Watson - negative    UPPER GASTROINTESTINAL ENDOSCOPY N/A 2022    EGD BIOPSY performed by Markus Diaz MD at [facility] ENDOSCOPY     Medications Prior to Admission:   No current facility-administered medications on file prior to encounter.      Current Outpatient Medications on File Prior to Encounter   Medication Sig Dispense Refill    aspirin 81 MG EC tablet Take 81 mg by mouth daily (Patient not taking: Reported on 2022)      pantoprazole (PROTONIX) 40 MG tablet Take 1 tablet by mouth daily (with breakfast) (Patient not taking: Reported on 2022) 30 tablet 3    atorvastatin (LIPITOR) 20 MG tablet TAKE ONE TABLET BY MOUTH DAILY 90 tablet 3    Calcium Carbonate-Vitamin D (CALCIUM + D) 600-200 MG-UNIT TABS Take  by mouth 2 times daily. (Patient not taking: Reported on 2022)       Allergies:  Patient has no known allergies. History of allergic reaction to anesthesia:  No    Social History:   TOBACCO:   reports that she has never smoked. She has never used smokeless tobacco.  ETOH:   reports current alcohol use. DRUGS:   has no history on file for drug use. Family History:       Problem Relation Age of Onset    Hypertension Father 80        , hypertension, facial tumor.  Cancer Mother 80        , carcinoma of the lung.  Cancer Brother         prostate    Breast Cancer Daughter 39       PHYSICAL EXAM:      /76   Pulse 82   Temp 97.5 °F (36.4 °C) (Oral)   Resp 16   Wt 121 lb 8 oz (55.1 kg)   SpO2 97%   BMI 23.73 kg/m²  I        Heart:  No m/r/g +s1/s2 RRR    Lungs:  CTA bilaterally    Abdomen:  Soft, nontender, non distended; +bs    ASA Grade:  ASA 3 - Patient with moderate systemic disease with functional limitations    Mallampati Class:  Class I: Soft palate, uvula, fauces, pillars visible  _____x_____  Class II: Soft palate, uvula, fauces visible  __________   Class III: Soft palate, base of uvula visible  __________  Class IV: Hard palate only visible   __________      ASSESSMENT AND PLAN:    1. Patient is a 68 y.o. female here for colonoscopy with deep sedation  2. Procedure options, risks and benefits reviewed with patient. We specifically discussed that risks include, but are not limited to infection, bleeding, perforation, death, and missed lesions. Patient expresses understanding.

## 2022-07-06 NOTE — PROGRESS NOTES
Patient alert and oriented, vitals stable. IV infiltrated right hand, removed. New #22 IV inserted RAC. Patient completed bowel prep, and is npo. Denied pain. Plan for Bone Bx tomorrow, kit placed in room. Called Lab to schedule some one to be here at 11:30. Patient left floor for colonoscopy, family left with her.

## 2022-07-06 NOTE — PROGRESS NOTES
Patient admitted to [facility] via wheelchair from [address] [place] [address] for diagnosis of possible multiple myeloma. Patient is alert and oriented to room including call light and bed controls. Patient is a low fall risk. Safety measures instituted per policy.     Patient oriented to unit policies and procedures including: pain management practices, unit safety precautions, family rapid response, q4h vital signs and assessments, daily 4am lab draws, weekly chest x-rays, weekly VRE rectal swabs for surveillance, daily chlorhexidine bathing, standing transfusion orders, and routine central line care. Also discussed use of call light and how to get in touch with nursing staff. Stressed the importance of calling out immediately for any changes in condition including but not limited to: pain, chills, fever, nausea, vomiting, diarrhea, chest pain, sob/medina, assistance with toileting, bleeding, or any other symptoms that are out of the ordinary for the patient. Patient verbalizes understanding of all instructions and will call for assistance as needed.

## 2022-07-06 NOTE — PROGRESS NOTES
See my note    [address] [facility]  GI Progress Note          Ferdinand Headings is a 68 y.o. female patient. 1. THADDEUS (acute kidney injury) ([address]    2. Melena        Admit Date: 7/2/2022    Subjective:       Pt seen and examined at bedside. TIESHAO. Yesterday underwent EGD, tolerated well. EGD - non revealing of any PUD. Pt scheduled for colonoscopy this afternoon, pt completed her prep. HB stable 8.1. No Melena.         ROS:  Cardiovascular ROS: no chest pain or dyspnea on exertion  Gastrointestinal ROS: no abdominal pain, change in bowel habits, or black or bloody stools  Respiratory ROS: no cough, shortness of breath, or wheezing    Scheduled Meds:   [START ON 7/10/2022] atorvastatin  20 mg Oral Daily    sodium chloride flush  5-40 mL IntraVENous 2 times per day    lidocaine  1 patch TransDERmal Daily       Continuous Infusions:   sodium chloride         PRN Meds:  hydrOXYzine HCl, labetalol, melatonin, sodium chloride flush, sodium chloride, ondansetron **OR** ondansetron, polyethylene glycol, acetaminophen **OR** acetaminophen      Objective:       Patient Vitals for the past 24 hrs:   BP Temp Temp src Pulse Resp SpO2   07/06/22 1117 128/75 97.9 °F (36.6 °C) Oral 82 16 95 %   07/06/22 0733 -- -- -- 82 -- --   07/06/22 0732 137/76 97.5 °F (36.4 °C) Oral 82 16 97 %   07/06/22 0316 137/67 97.8 °F (36.6 °C) Oral 85 16 100 %   07/05/22 2254 (!) 158/81 98 °F (36.7 °C) Oral 78 15 93 %   07/05/22 2043 (!) 154/83 97.7 °F (36.5 °C) Oral 84 18 98 %   07/05/22 1548 (!) 152/86 98.3 °F (36.8 °C) Oral 84 17 95 %   07/05/22 1459 (!) 155/79 -- -- 76 18 100 %   07/05/22 1440 (!) 152/82 -- -- 76 16 100 %   07/05/22 1435 (!) 143/77 -- -- 74 16 100 %   07/05/22 1427 130/77 97.9 °F (36.6 °C) Temporal 78 16 99 %   07/05/22 1230 137/78 98 °F (36.7 °C) Temporal 79 16 95 %   07/05/22 1210 135/68 -- -- 87 -- 94 %   07/05/22 1209 132/69 -- -- 86 -- 96 %   07/05/22 1204 (!) 143/64 -- -- 85 -- 92 %   07/05/22 1159 (!) 143/64 -- -- 86 -- 91 %   22 1154 (!) 143/64 -- -- 92 -- (!) 87 %   22 1149 (!) 157/62 -- -- 91 -- 98 %   22 1138 (!) 158/66 -- -- 96 -- 99 %       Exam:  VITALS:  /75   Pulse 82   Temp 97.9 °F (36.6 °C) (Oral)   Resp 16   Wt 121 lb 8 oz (55.1 kg)   SpO2 95%   BMI 23.73 kg/m²   TEMPERATURE:  Current - Temp: 97.9 °F (36.6 °C); Max - Temp  Av.9 °F (36.6 °C)  Min: 97.5 °F (36.4 °C)  Max: 98.3 °F (36.8 °C)    NAD  General appearance: alert, appears stated age, cooperative and no distress  Head: Normocephalic, without obvious abnormality, atraumatic  Neck: supple, symmetrical, trachea midline and thyroid not enlarged, symmetric, no tenderness/mass/nodules  CVS:  RRR, Nl s1s2  Lungs CTA Bilaterally, normal effort  Abdomen: soft, non-tender; bowel sounds normal; no masses,  no organomegaly  AAOx3, No asterixis or encephalopathy  Extremities: No edema. Recent Labs     224 22  1805   WBC 4.0  --   --  4.2  --    HGB 7.7*   < > 9.2* 7.9* 8.7*   HCT 24.1*   < > 28.7* 24.2* 26.3*   MCV 85.8  --   --  85.4  --    *  --   --  96*  --     < > = values in this interval not displayed. Recent Labs     22    140   K 4.3 4.0  4.0    108   CO2 23 23   PHOS  --  4.6   BUN 38* 32*   CREATININE 3.9* 4.2*     No results for input(s): AST, ALT, ALB, BILIDIR, BILITOT, ALKPHOS in the last 72 hours. No results for input(s): LIPASE, AMYLASE in the last 72 hours. Recent Labs     22  1015   INR 1.17*     No results for input(s): PTT in the last 72 hours. No results for input(s): OCCULTBLD in the last 72 hours. 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  *      EGD yesterday non-revealing    · Melenoma, light chains elevated  · S/p kidney biopsy    Recommendations:         D/C Protonix 40 IV bid  D/c Prednisone 40 po per nephro   NPO  Colonoscopy today  BM bx tomorrow, per Oncology            Suraj Moore MD  7/6/2022  11:29 AM

## 2022-07-06 NOTE — ANESTHESIA PRE PROCEDURE
Department of Anesthesiology  Preprocedure Note       Name:  Andrei Perez   Age:  68 y.o.  :  1945                                          MRN:  [id number]         Date:  2022      Surgeon: Debi Mayfield):  Michael Corcoran MD    Procedure: Procedure(s):  COLONOSCOPY DIAGNOSTIC    Medications prior to admission:   Prior to Admission medications    Medication Sig Start Date End Date Taking? Authorizing Provider   aspirin 81 MG EC tablet Take 81 mg by mouth daily  Patient not taking: Reported on 2022    Historical Provider, MD   pantoprazole (PROTONIX) 40 MG tablet Take 1 tablet by mouth daily (with breakfast)  Patient not taking: Reported on 2022 6/15/22   Trudy Galloway MD   atorvastatin (LIPITOR) 20 MG tablet TAKE ONE TABLET BY MOUTH DAILY 21   Trudy Galloway MD   Calcium Carbonate-Vitamin D (CALCIUM + D) 600-200 MG-UNIT TABS Take  by mouth 2 times daily. Patient not taking: Reported on 2022    Historical Provider, MD       Current medications:    No current facility-administered medications for this visit. No current outpatient medications on file.      Facility-Administered Medications Ordered in Other Visits   Medication Dose Route Frequency Provider Last Rate Last Admin    hydrOXYzine HCl (ATARAX) tablet 10 mg  10 mg Oral TID PRN Michael Corcoran MD        labetalol (NORMODYNE;TRANDATE) injection 10 mg  10 mg IntraVENous Q6H PRN Michael Corcoran MD        melatonin tablet 6 mg  6 mg Oral Nightly PRN MD Alexx Mccurdy ON 7/10/2022] atorvastatin (LIPITOR) tablet 20 mg  20 mg Oral Daily Michael Corcoran MD        sodium chloride flush 0.9 % injection 5-40 mL  5-40 mL IntraVENous 2 times per day Michael Corcoran MD   10 mL at 22 0809    sodium chloride flush 0.9 % injection 5-40 mL  5-40 mL IntraVENous PRN Michael Corcoran MD        0.9 % sodium chloride infusion   IntraVENous PRN Michael Corcoran MD        ondansetron (ZOFRAN-ODT) disintegrating tablet 4 mg  4 mg Oral Q8H PRN Phani Mckeon MD        Or    ondansetron [facility]) injection 4 mg  4 mg IntraVENous Q6H PRN Phani Mckeon MD   4 mg at 07/03/22 2032    polyethylene glycol (GLYCOLAX) packet 17 g  17 g Oral Daily PRN Phani Mckeon MD        acetaminophen (TYLENOL) tablet 650 mg  650 mg Oral Q6H PRN Phani Mckeon MD        Or   [employer] acetaminophen (TYLENOL) suppository 650 mg  650 mg Rectal Q6H PRN Phani Mckeon MD        lidocaine 4 % external patch 1 patch  1 patch TransDERmal Daily Phani Mckeon MD           Allergies:  No Known Allergies    Problem List:    Patient Active Problem List   Diagnosis Code    Hyperlipidemia E78.5    Adenomatous polyp of sigmoid colon D12.5    Nocturia R35.1    Visual loss, right eye H54.61    Heart murmur R01.1    Bilateral carotid artery stenosis I65.23    Gastritis K29.70    Other fatigue R53.83    Epigastric pain R10.13    THADDEUS (acute kidney injury) (HCC) N17.9    Anemia D64.9    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 11,2019    Negative     Hyperlipidemia     Osteoporosis DEXA - Sept., 2011    Lumbar T score -2.5 and frm neck -1.8    Screening mammogram for high-risk patient November 10, 2011    Negative    Screening mammogram, encounter for *March 8, 2017    Negative    Screening mammogram, encounter for *March 14, 2018    Negative    Screening mammogram, encounter for *March 20, 2019    Negative    Screening mammogram, encounter for *Leidy 15, 2020    Negative    Vitamin D deficiency     Wrist fracture, left Mar., 2016    Left       Past Surgical History:        Procedure Laterality Date    COLONOSCOPY Dec., 2002 ( 2012 )    Dr. Abhi Johnson - normal.    COLONOSCOPY  Oct., 2013 ( 2023 )     - Normal    CT BIOPSY RENAL  7/5/2022    CT BIOPSY RENAL 7/5/2022 [address] SCAN    CYSTOSCOPY  *May 14, 2020    Dr. Wilmer Candelaria - negative    UPPER GASTROINTESTINAL ENDOSCOPY N/A 7/5/2022    EGD BIOPSY performed by Kyler Sifuentes MD at [facility] History:    Social History     Tobacco Use    Smoking status: Never Smoker    Smokeless tobacco: Never Used   Substance Use Topics    Alcohol use: Yes     Alcohol/week: 0.0 standard drinks     Comment: occasional wine. Counseling given: Not Answered      Vital Signs (Current): There were no vitals filed for this visit.                                            BP Readings from Last 3 Encounters:   07/06/22 (!) 158/87   07/01/22 122/78   06/15/22 120/80       NPO Status:                                                                                 BMI:   Wt Readings from Last 3 Encounters:   07/02/22 121 lb 8 oz (55.1 kg)   07/01/22 121 lb (54.9 kg)   06/15/22 127 lb 6.4 oz (57.8 kg)     There is no height or weight on file to calculate BMI.    CBC:   Lab Results   Component Value Date/Time    WBC 6.4 07/06/2022 11:50 AM    RBC 2.93 07/06/2022 11:50 AM    HGB 8.1 07/06/2022 11:50 AM    HCT 25.2 07/06/2022 11:50 AM    MCV 85.8 07/06/2022 11:50 AM    RDW 14.7 07/06/2022 11:50 AM    PLT 93 07/06/2022 11:50 AM       CMP:   Lab Results   Component Value Date/Time     07/06/2022 11:49 AM    K 4.2 07/06/2022 11:49 AM    CL 99 07/06/2022 11:49 AM    CO2 23 07/06/2022 11:49 AM    BUN 37 07/06/2022 11:49 AM    CREATININE 4.2 07/06/2022 11:49 AM    GFRAA 12 07/06/2022 11:49 AM    GFRAA >60 07/09/2012 07:51 AM    AGRATIO 2.0 07/01/2022 04:06 PM    LABGLOM 10 07/06/2022 11:49 AM    GLUCOSE 118 07/06/2022 11:49 AM    PROT 7.1 07/01/2022 04:06 PM    PROT 6.5 07/09/2012 07:51 AM    CALCIUM 9.5 07/06/2022 11:49 AM    BILITOT 0.3 07/01/2022 04:06 PM    ALKPHOS 92 07/01/2022 04:06 PM    AST 15 07/01/2022 04:06 PM    ALT 7 07/01/2022 04:06 PM       POC Tests: No results for input(s): POCGLU, POCNA, POCK, POCCL, POCBUN, POCHEMO, POCHCT in the last 72 hours. Coags:   Lab Results   Component Value Date/Time    PROTIME 14.9 07/05/2022 10:15 AM    INR 1.17 07/05/2022 10:15 AM       HCG (If Applicable): No results found for: PREGTESTUR, PREGSERUM, HCG, HCGQUANT     ABGs: No results found for: PHART, PO2ART, LVR2DIZ, WVV2BGQ, BEART, L5GAQRZI     Type & Screen (If Applicable):  No results found for: LABABO, LABRH    Drug/Infectious Status (If Applicable):  No results found for: HIV, HEPCAB    COVID-19 Screening (If Applicable):   Lab Results   Component Value Date/Time    COVID19 NOT DETECTED 12/21/2020 07:50 PM           Anesthesia Evaluation  Patient summary reviewed and Nursing notes reviewed no history of anesthetic complications:   Airway: Mallampati: III  TM distance: <3 FB   Neck ROM: full  Mouth opening: > = 3 FB   Dental: normal exam   (+) bridge      Pulmonary: breath sounds clear to auscultation      (-) not a current smoker                           Cardiovascular:  Exercise tolerance: good (>4 METS),   (+) hyperlipidemia        Rhythm: regular  Rate: normal                    Neuro/Psych:   (+) TIA,             GI/Hepatic/Renal:   (+) renal disease: ARF,           Endo/Other:    (+) blood dyscrasia: thrombocytopenia and anemia:., malignancy/cancer. Abdominal:       Abdomen: soft. Vascular: negative vascular ROS. Other Findings:             Anesthesia Plan      MAC     ASA 4       Induction: intravenous. MIPS: Prophylactic antiemetics administered. Anesthetic plan and risks discussed with patient. Plan discussed with CRNA.     Attending anesthesiologist reviewed and agrees with Preprocedure content                Lobo Campbell DO   7/6/2022

## 2022-07-06 NOTE — ANESTHESIA POSTPROCEDURE EVALUATION
Department of Anesthesiology  Postprocedure Note    Patient: Margarita Miller  MRN: [id number]  YOB: 1945  Date of evaluation: 7/6/2022      Procedure Summary     Date: 07/06/22 Room / Location: [facility]    Anesthesia Start: 1734 Anesthesia Stop: 1419    Procedures:       COLONOSCOPY POLYPECTOMY ABLATION (N/A )      COLONOSCOPY POLYPECTOMY SNARE/COLD BIOPSY (N/A ) Diagnosis:       Melena      (Melena [K92.1])    Surgeons: Tod Barker MD Responsible Provider: Devan Lama DO    Anesthesia Type: MAC ASA Status: 4          Anesthesia Type: No value filed.     Esau Phase I: Esau Score: 10    Esau Phase II: Esau Score: 10      Anesthesia Post Evaluation    Patient location during evaluation: PACU  Patient participation: complete - patient participated  Level of consciousness: awake and alert  Airway patency: patent  Nausea & Vomiting: no nausea and no vomiting  Cardiovascular status: blood pressure returned to baseline  Respiratory status: acceptable  Hydration status: euvolemic

## 2022-07-06 NOTE — CONSULTS
[facility] consult note     Attending Physician: Shefali Leal MD    Primary Care: Herman Brown MD       Referring MD: No referring provider defined for this encounter. Name: Stacy Wheeler :  1945  MRN:  [id number]    Admission: 2022      Date: 2022    Reason for Admission: nausea and abnormal labs     Reason for consult: concern for multiple myeloma    History of Present Illness:   Ms. Duarte Carrillo is a 77-year-old female with no past medical history who presented to the emergency room department with renal failure. Patient has been having back pain for few months now. She saw her primary care physician and was started on Advil which helped her pain. However for the past 2 to 3 weeks she has been having nausea and upset stomach, she denies any emesis. She had blood work performed at her primary care physician which showed acute renal failure and she was sent to the emergency room department. She was also anemia anemic on presentation. She denies any bleeding at home. She has been losing weight intentionally in the past but however lost 5 pounds in the past 2 weeks due to nausea. She does complain of some fatigue. At this time she denies any localized back pain but she does have diffuse body aches and just soreness. She denies any falls she has been eating well. .  She has been taking Advil pretty regularly on an empty stomach for pain. Said since admission to the hospital and reported that she had a menstrual notable for him as well as the last week or before. she had work-up for her renal failure along with a kidney biopsy yesterday. Her work-up suggested elevated lambda light chain and 5000 and proteinuria of 4 g. There is a suspicion for multiple myeloma hence hematology has been consulted.       Past Surgical History:   Procedure Laterality Date    COLONOSCOPY  Dec., 2002 (  )    Dr. Loredo Null - normal.    COLONOSCOPY  Oct., 2013 (  )  - Normal    CT BIOPSY RENAL  2022    CT BIOPSY RENAL 2022 [facility] CT SCAN    CYSTOSCOPY  *May 14, 2020    Dr. Randolph Esters - negative    UPPER GASTROINTESTINAL ENDOSCOPY N/A 2022    EGD BIOPSY performed by Emeka Majano MD at [facility] ENDOSCOPY       Past Medical History:   Diagnosis Date    THADDEUS (acute kidney injury) ([facility] [address] 2022    Bilateral carotid artery stenosis 2022    Bilateral carotid artery stenosis 2022    Encounter for cervical Pap smear with pelvic exam *Oct. 11,2019    Negative     Hyperlipidemia     Osteoporosis DEXA - 2011    Lumbar T score -2.5 and frm neck -1.8    Screening mammogram for high-risk patient November 10, 2011    Negative    Screening mammogram, encounter for *2017    Negative    Screening mammogram, encounter for *2018    Negative    Screening mammogram, encounter for *2019    Negative    Screening mammogram, encounter for *Leidy 15, 2020    Negative    Vitamin D deficiency     Wrist fracture, left Mar., 2016    Left       Prior to Admission medications    Medication Sig Start Date End Date Taking? Authorizing Provider   aspirin 81 MG EC tablet Take 81 mg by mouth daily  Patient not taking: Reported on 2022    Historical Provider, MD   pantoprazole (PROTONIX) 40 MG tablet Take 1 tablet by mouth daily (with breakfast)  Patient not taking: Reported on 2022 6/15/22   Sean Arevalo MD   atorvastatin (LIPITOR) 20 MG tablet TAKE ONE TABLET BY MOUTH DAILY 21   Sean Arevalo MD   Calcium Carbonate-Vitamin D (CALCIUM + D) 600-200 MG-UNIT TABS Take  by mouth 2 times daily. Patient not taking: Reported on 2022    Historical Provider, MD       No Known Allergies    Family History   Problem Relation Age of Onset    Hypertension Father 80        , hypertension, facial tumor.  Cancer Mother 80        , carcinoma of the lung.      Cancer Brother         prostate    Breast Cancer Daughter 39        Social History     Socioeconomic History    Marital status:      Spouse name: Namita Krueger Number of children: 3    Years of education: Not on file    Highest education level: Not on file   Occupational History    Occupation: part time seamstress   Tobacco Use    Smoking status: Never Smoker    Smokeless tobacco: Never Used   Vaping Use    Vaping Use: Never used   Substance and Sexual Activity    Alcohol use: Yes     Alcohol/week: 0.0 standard drinks     Comment: occasional wine.  Drug use: Not on file    Sexual activity: Not on file   Other Topics Concern    Not on file   Social History Narrative    Living Will:  No.    Happily  and 3 kids here    Cooks and bakes,gardens--PT [employer]    Walks daily,yoga         Social Determinants of Health     Financial Resource Strain: Low Risk     Difficulty of Paying Living Expenses: Not hard at all   Food Insecurity: No Food Insecurity    Worried About Running Out of Food in the Last Year: Never true    [address] in the Last Year: Never true   Transportation Needs:     Lack of Transportation (Medical): Not on file    Lack of Transportation (Non-Medical):  Not on file   Physical Activity:     Days of Exercise per Week: Not on file    Minutes of Exercise per Session: Not on file   Stress:     Feeling of Stress : Not on file   Social Connections:     Frequency of Communication with Friends and Family: Not on file    Frequency of Social Gatherings with Friends and Family: Not on file    Attends [employer]ism Services: Not on file    Active Member of Clubs or Organizations: Not on file    Attends Club or Organization Meetings: Not on file    Marital Status: Not on file   Intimate Partner Violence:     Fear of Current or Ex-Partner: Not on file    Emotionally Abused: Not on file    Physically Abused: Not on file    Sexually Abused: Not on file   Housing Stability:     Unable to Pay for Housing in the Last Year: Not on file    Number of Places Lived in the Last Year: Not on file    Unstable Housing in the Last Year: Not on file        ROS:  As noted above, otherwise remainder of 10-point ROS negative    Physical Exam:     Vital Signs:  /75   Pulse 82   Temp 97.9 °F (36.6 °C) (Oral)   Resp 16   Wt 121 lb 8 oz (55.1 kg)   SpO2 95%   BMI 23.73 kg/m²     Weight:    Wt Readings from Last 3 Encounters:   07/02/22 121 lb 8 oz (55.1 kg)   07/01/22 121 lb (54.9 kg)   06/15/22 127 lb 6.4 oz (57.8 kg)       General: Awake, alert and oriented. HEENT: normocephalic, PERRL, no scleral erythema or icterus, Oral mucosa moist and intact, throat clear  NECK: supple without palpable adenopathy  BACK: Straight negative CVAT  SKIN: warm dry and intact without lesions rashes or masses  CHEST: CTA bilaterally without use of accessory muscles  CV: Normal S1 S2, RRR, no MRG  ABD: NT ND normoactive BS, no palpable masses or hepatosplenomegaly  EXTREMITIES: without edema, denies calf tenderness  NEURO: CN II - XII grossly intact      Laboratory Data:   CBC:   Recent Labs     07/04/22  0618 07/04/22 0618 07/04/22 2124 07/05/22  0528 07/05/22  1805   WBC 4.0  --   --  4.2  --    HGB 7.7*   < > 9.2* 7.9* 8.7*   HCT 24.1*   < > 28.7* 24.2* 26.3*   MCV 85.8  --   --  85.4  --    *  --   --  96*  --     < > = values in this interval not displayed. BMP/Mag:  Recent Labs     07/04/22 0618 07/05/22 0528    140   K 4.3 4.0  4.0    108   CO2 23 23   PHOS  --  4.6   BUN 38* 32*   CREATININE 3.9* 4.2*   MG 1.80 1.70*     LIVP: No results for input(s): AST, ALT, LIPASE, BILIDIR, BILITOT, ALKPHOS in the last 72 hours. Invalid input(s): AMYLASE,  ALB  Coags:   Recent Labs     07/05/22  1015   PROTIME 14.9*   INR 1.17*     Uric Acid No results for input(s): LABURIC in the last 72 hours.     PROBLEM LIST:                   TREATMENT:                      ASSESSMENT AND PLAN:           Ms. Liliane Rizzo is a 68-year-old female with no past medical history who presented to the emergency room department with renal failure    1) Suspected Multiple myeloma  - Pt presented with renal failure normocytic anemia   - Underwent a renal bx 7/5/22 results pending   - Serum light chain with ratio <0 and lambda light chain >5000  - SPEP, ADA and immunoglobulins are pending   - Pt does have diffuse bone pain but no particular location for back pain  - plan for bone marrow bx tomorrow at 11:30 am   - If clinically stable, can be discharged following that and plan to follow up with me in clinic to discuss her bone marrow bx results. 2) Renal failure/ THADDEUS  - no signs of volume overload   - has 4 g of proteinuria.   - Will follow up biopsy  - no emergent indication for dialysis at this time  - monitor for now    Thank you for the consult, will follow  Janel Pena DO

## 2022-07-06 NOTE — PROCEDURES
was then withdrawn (>6minutes) with close inspection of the mucosa in a circumferential manor. TI normal for 5cms. Retroflexed views under the ICV and of the right colon obtained. Small angioectasia in the cecum treated with hot snare on soft coag for tissue destruction. 4mm transverse and 5mm sigmoid polyps removed with cold snare. Retroflexed views of the rectum show hemorrhoids. No immediate complications. The preparation was good. Estimated blood loss none    Impression:  Normal TI  AVM of cecum s/p obliteration  Two polyps removed  Plan:  The patient is aware it is their responsibility to call for biopsy results in 7 days. Repeat colonoscopy in 5 years.   Likely has more AVMs throughout small bowel; outpatient SBFT and capsule  Call back with questions/concerns

## 2022-07-06 NOTE — PROGRESS NOTES
Transport here to take pt. Back to her room. No distress notes, no c/o and/or needs voiced. All personal effects sent with pt.

## 2022-07-06 NOTE — PROGRESS NOTES
Progress Note    Admit Date: 7/2/2022  Day: 5  Diet: Diet NPO Exceptions are: Sips of Water with Meds    CC: Stomach Pain    Interval history: Patient had EGD yesterday with no abnormal findings. Was prepped for colonoscopy which will happen today. Hypertensive last night into the 150s but normotensive into the AM. Other VSS. C4 came back elevated. Acidosis has improved. HPI: Miss Jean Gray is a 69 y/o female who presents to the ED under guidance from her PCP because of an abnormal creatinine and persistent GI pain. She had been taking a baby aspirin daily for a couple of months and then started taking advil q4-6h for 2-3 days on an empty stomach to relieve her pain. She was feeling weak recently and decided to visit her PCP, who took her labs.       ED Course: She states the abdominal pain is located in the epigastric region and is mild. It does not radiate and nothing improves it or makes it worse. She has not tried anything to make it better. She reports anorexia and worsening pain at night. She denies chest pain, f/c, coughing, SOB, urinary symptoms, head ache or N/V. She has no history of abdominal pain.      On CT, there were no findings for acute intra-abdominal or pelvic abnormality to explain the symptoms. Only lab abnormalities were a creatinine of 3.9 (baseline 0.6) and Hgb of 9.6. VSS apart from blood pressure, which was up to 161/80.       Patient seen by GI, who agrees with suspected diagnosis of PUD for abdominal pain. Planning for EGD for Today. Patient had proteinuria, so was suspected of having NSAID induced MARYANN superimposed on AIN. Will start on prednisone on 7/5.        EGD showed no evidence of duodenal or stomach pathology. Biopsies taken to investigate for malabsorption.      Medications:     Scheduled Meds:   [START ON 7/10/2022] atorvastatin  20 mg Oral Daily    sodium chloride flush  5-40 mL IntraVENous 2 times per day    lidocaine  1 patch TransDERmal Daily     Continuous Infusions:   sodium chloride       PRN Meds:hydrOXYzine HCl, labetalol, melatonin, sodium chloride flush, sodium chloride, ondansetron **OR** ondansetron, polyethylene glycol, acetaminophen **OR** acetaminophen    Objective:   Vitals:   T-max:  Patient Vitals for the past 8 hrs:   BP Temp Temp src Pulse Resp SpO2   07/06/22 0733 -- -- -- 82 -- --   07/06/22 0732 137/76 97.5 °F (36.4 °C) Oral 82 16 97 %   07/06/22 0316 137/67 97.8 °F (36.6 °C) Oral 85 16 100 %       Intake/Output Summary (Last 24 hours) at 7/6/2022 0948  Last data filed at 7/5/2022 1851  Gross per 24 hour   Intake 300 ml   Output 800 ml   Net -500 ml       Review of Systems   Unable to perform ROS: Other       Physical Exam  Constitutional:       Comments: Rest of Physical Exam deferred until later today for sake of patient privacy     Neurological:      Mental Status: She is alert and oriented to person, place, and time. LABS:    CBC:   Recent Labs     07/04/22 0618 07/04/22 0618 07/04/22 2124 07/05/22 0528 07/05/22  1805   WBC 4.0  --   --  4.2  --    HGB 7.7*   < > 9.2* 7.9* 8.7*   HCT 24.1*   < > 28.7* 24.2* 26.3*   *  --   --  96*  --    MCV 85.8  --   --  85.4  --     < > = values in this interval not displayed. Renal:    Recent Labs     07/04/22 0618 07/05/22 0528    140   K 4.3 4.0  4.0    108   CO2 23 23   BUN 38* 32*   CREATININE 3.9* 4.2*   GLUCOSE 91 94   CALCIUM 9.6 9.5   MG 1.80 1.70*   PHOS  --  4.6   ANIONGAP 7 9     Hepatic:   Recent Labs     07/05/22 0528   LABALBU 3.2*     Troponin: No results for input(s): TROPONINI in the last 72 hours. BNP: No results for input(s): BNP in the last 72 hours. Lipids: No results for input(s): CHOL, HDL in the last 72 hours. Invalid input(s): LDLCALCU, TRIGLYCERIDE  ABGs:  No results for input(s): PHART, IUJ6LIP, PO2ART, LSJ9OVM, BEART, THGBART, U5MFKFAG, GVK9UKG in the last 72 hours.     INR:   Recent Labs     07/05/22  1015   INR 1.17*     Lactate: No results for input(s): LACTATE in the last 72 hours. Cultures:  -----------------------------------------------------------------  RAD:   CT BIOPSY RENAL   Final Result   Impression:   Successful image guided nontarget renal biopsy as described above. CT ABDOMEN PELVIS WO CONTRAST Additional Contrast? None   Final Result      1. No findings for acute intra-abdominal or pelvic abnormality to explain patient's symptoms. 2.  Hepatic cyst.      3.  Small hiatal hernia. Assessment/Plan:       Patient is a 67 y/o female who a PMHx of THADDEUS, HLD and Osteoporosis who presents to the ED under guidance from her PCP who saw abnormal labs.     Acute Kidney Injury  Creatinine of 3.9 from a baseline of 0.6. 3.9 this am.   - Protein UR of 200; there could be superimposed NSAID induced MARYANN and AIN  -  Start prednisone 40 mg PO qd. -  Urine studies - indicative of possible postrenal etiology but patient had been receiving IVF. History is suggestive of NSAID induced AIN  - Could benefit from Steroids  -  trend Cr, UOP  -  bladder scan qshift  -  avoid nephrotoxins  -  Biopsy Done  -  Light chain and C4 positive - potentially from MM     Metabolic acidosis  Patient with bicarbonate of 15 on admission. Bicarb now 23. Initially had anion gap up to 24 which has now resolved. May be due to acute renal failure. Was previously receiving NS infusion.  -continue monitor  -switched to [facility]      Anemia 2/2 to Multiple Myeloma  Hemoglobin of 9.7 in ED from baseline ~12, down to 7.9 this am (was 9.2 yesterday). Month long use of NSAIDs and increase pain at night pointed to PUD as possible culprit. However, EGD results came back negative for PUD.  Because of positive light chains and C4 in urine, possible that MM is etiology.   - H/H q12, transfuse if below 7  - LR @ 75 cc/hr  - Protonix infusion  - Hold NSAIDs  - EGD came back negative  - Positive Light chain and C4      Hypertension  Blood pressure up to 160  - Labetolol PRN q4h if SBP above 180     Thromboyctopenia  Platelets 96 (was 804 yesterday). Currently no indication for transfusion.  Likely dilutional  - continue monitor     Code Status: Full  FEN: NPO  PPX: SCD  Eric Camejo MD, PGY-1  07/06/22  9:48 AM    This patient has been staffed and discussed with Johnnie Coles MD.

## 2022-07-06 NOTE — PROGRESS NOTES
Patient being transferred to [phone], report called to [address].  in room, informed. Left with transport.

## 2022-07-07 VITALS
TEMPERATURE: 97.8 F | BODY MASS INDEX: 24.84 KG/M2 | RESPIRATION RATE: 19 BRPM | DIASTOLIC BLOOD PRESSURE: 66 MMHG | HEART RATE: 94 BPM | SYSTOLIC BLOOD PRESSURE: 119 MMHG | WEIGHT: 127.2 LBS | OXYGEN SATURATION: 96 %

## 2022-07-07 LAB
ANION GAP SERPL CALCULATED.3IONS-SCNC: 14 MMOL/L (ref 3–16)
BASOPHILS ABSOLUTE: 0 K/UL (ref 0–0.2)
BASOPHILS RELATIVE PERCENT: 0.1 %
BUN BLDV-MCNC: 43 MG/DL (ref 7–20)
CALCIUM SERPL-MCNC: 8.8 MG/DL (ref 8.3–10.6)
CHLORIDE BLD-SCNC: 109 MMOL/L (ref 99–110)
CO2: 20 MMOL/L (ref 21–32)
CREAT SERPL-MCNC: 3.8 MG/DL (ref 0.6–1.2)
EOSINOPHILS ABSOLUTE: 0 K/UL (ref 0–0.6)
EOSINOPHILS RELATIVE PERCENT: 0.2 %
GFR AFRICAN AMERICAN: 14
GFR NON-AFRICAN AMERICAN: 12
GLUCOSE BLD-MCNC: 101 MG/DL (ref 70–99)
HCT VFR BLD CALC: 23.3 % (ref 36–48)
HEMOGLOBIN: 7.6 G/DL (ref 12–16)
LYMPHOCYTES ABSOLUTE: 1.1 K/UL (ref 1–5.1)
LYMPHOCYTES RELATIVE PERCENT: 15.5 %
MAGNESIUM: 2 MG/DL (ref 1.8–2.4)
MCH RBC QN AUTO: 27.6 PG (ref 26–34)
MCHC RBC AUTO-ENTMCNC: 32.6 G/DL (ref 31–36)
MCV RBC AUTO: 84.8 FL (ref 80–100)
MONOCYTES ABSOLUTE: 0.8 K/UL (ref 0–1.3)
MONOCYTES RELATIVE PERCENT: 11.3 %
NEUTROPHILS ABSOLUTE: 5.2 K/UL (ref 1.7–7.7)
NEUTROPHILS RELATIVE PERCENT: 72.9 %
PDW BLD-RTO: 14.7 % (ref 12.4–15.4)
PLATELET # BLD: 98 K/UL (ref 135–450)
PMV BLD AUTO: 8.5 FL (ref 5–10.5)
POTASSIUM REFLEX MAGNESIUM: 4.1 MMOL/L (ref 3.5–5.1)
RBC # BLD: 2.75 M/UL (ref 4–5.2)
SODIUM BLD-SCNC: 143 MMOL/L (ref 136–145)
WBC # BLD: 7.2 K/UL (ref 4–11)

## 2022-07-07 PROCEDURE — 83735 ASSAY OF MAGNESIUM: CPT

## 2022-07-07 PROCEDURE — 36415 COLL VENOUS BLD VENIPUNCTURE: CPT

## 2022-07-07 PROCEDURE — 6370000000 HC RX 637 (ALT 250 FOR IP): Performed by: STUDENT IN AN ORGANIZED HEALTH CARE EDUCATION/TRAINING PROGRAM

## 2022-07-07 PROCEDURE — 99233 SBSQ HOSP IP/OBS HIGH 50: CPT | Performed by: INTERNAL MEDICINE

## 2022-07-07 PROCEDURE — 88311 DECALCIFY TISSUE: CPT

## 2022-07-07 PROCEDURE — 2580000003 HC RX 258: Performed by: INTERNAL MEDICINE

## 2022-07-07 PROCEDURE — 88305 TISSUE EXAM BY PATHOLOGIST: CPT

## 2022-07-07 PROCEDURE — 85025 COMPLETE CBC W/AUTO DIFF WBC: CPT

## 2022-07-07 PROCEDURE — 80048 BASIC METABOLIC PNL TOTAL CA: CPT

## 2022-07-07 PROCEDURE — 07DR3ZX EXTRACTION OF ILIAC BONE MARROW, PERCUTANEOUS APPROACH, DIAGNOSTIC: ICD-10-PCS | Performed by: STUDENT IN AN ORGANIZED HEALTH CARE EDUCATION/TRAINING PROGRAM

## 2022-07-07 PROCEDURE — 2580000003 HC RX 258: Performed by: ANESTHESIOLOGY

## 2022-07-07 PROCEDURE — 88313 SPECIAL STAINS GROUP 2: CPT

## 2022-07-07 RX ORDER — LORAZEPAM 1 MG/1
1 TABLET ORAL ONCE
Status: COMPLETED | OUTPATIENT
Start: 2022-07-07 | End: 2022-07-07

## 2022-07-07 RX ORDER — HYDROXYZINE HYDROCHLORIDE 10 MG/1
10 TABLET, FILM COATED ORAL 3 TIMES DAILY PRN
Qty: 30 TABLET | Refills: 0 | Status: SHIPPED | OUTPATIENT
Start: 2022-07-07 | End: 2022-07-17

## 2022-07-07 RX ORDER — LORAZEPAM 2 MG/ML
1 INJECTION INTRAMUSCULAR ONCE
Status: DISCONTINUED | OUTPATIENT
Start: 2022-07-07 | End: 2022-07-07

## 2022-07-07 RX ADMIN — SODIUM CHLORIDE, PRESERVATIVE FREE 10 ML: 5 INJECTION INTRAVENOUS at 09:14

## 2022-07-07 RX ADMIN — LORAZEPAM 1 MG: 1 TABLET ORAL at 11:52

## 2022-07-07 RX ADMIN — SODIUM CHLORIDE, PRESERVATIVE FREE 10 ML: 5 INJECTION INTRAVENOUS at 09:15

## 2022-07-07 RX ADMIN — SODIUM CHLORIDE: 9 INJECTION, SOLUTION INTRAVENOUS at 06:48

## 2022-07-07 ASSESSMENT — ENCOUNTER SYMPTOMS
BACK PAIN: 1
NAUSEA: 0
VOMITING: 0
SHORTNESS OF BREATH: 0
COUGH: 0

## 2022-07-07 ASSESSMENT — PAIN SCALES - WONG BAKER
WONGBAKER_NUMERICALRESPONSE: 0

## 2022-07-07 NOTE — PLAN OF CARE
Problem: ABCDS Injury Assessment  Goal: Absence of physical injury  Outcome: Progressing  Note: Orthostatic vital signs obtained at start of shift - see flowsheet for details. Pt does not meet criteria for orthostasis. Pt is a Med fall risk. See Tropic Stefan Fall Score and ABCDS Injury Risk assessments. - Screening for Orthostasis AND not a Shelby Risk per HERNANDEZ/ABCDS: Pt bed is in low position, side rails up, call light and belongings are in reach. Fall risk light is on outside pts room. Pt encouraged to call for assistance as needed. Will continue with hourly rounds for PO intake, pain needs, toileting and repositioning as needed. Problem: Pain  Goal: Verbalizes/displays adequate comfort level or baseline comfort level  Outcome: Progressing  Flowsheets (Taken 7/7/2022 0607)  Verbalizes/displays adequate comfort level or baseline comfort level:   Encourage patient to monitor pain and request assistance   Assess pain using appropriate pain scale  Note: Patient has not complained of any pain or discomfort this shift. Will continue to monitor comfort.

## 2022-07-07 NOTE — PROGRESS NOTES
lambda light chains. Together with her anemia and diffuse back pain, she was suspected of having MM and prednisone was discontinued. She will receive a bone marrow biopsy today (7/7). Medications:     Scheduled Meds:   sodium chloride flush  5-40 mL IntraVENous 2 times per day    [START ON 7/10/2022] atorvastatin  20 mg Oral Daily    sodium chloride flush  5-40 mL IntraVENous 2 times per day    lidocaine  1 patch TransDERmal Daily     Continuous Infusions:   sodium chloride      sodium chloride 100 mL/hr at 07/06/22 2033    sodium chloride       PRN Meds:sodium chloride flush, sodium chloride, hydrOXYzine HCl, labetalol, melatonin, sodium chloride flush, sodium chloride, ondansetron **OR** ondansetron, polyethylene glycol, acetaminophen **OR** acetaminophen    Objective:   Vitals:   T-max:  Patient Vitals for the past 8 hrs:   BP Temp Temp src Pulse Resp SpO2   07/07/22 0427 130/61 98.2 °F (36.8 °C) Oral 76 17 100 %   07/07/22 0027 132/76 98 °F (36.7 °C) Oral 81 19 96 %       Intake/Output Summary (Last 24 hours) at 7/7/2022 0646  Last data filed at 7/7/2022 0427  Gross per 24 hour   Intake 560 ml   Output 700 ml   Net -140 ml       Review of Systems   Constitutional: Negative for chills and fever. Respiratory: Negative for cough and shortness of breath. Cardiovascular: Negative for chest pain. Gastrointestinal: Negative for nausea and vomiting. Musculoskeletal: Positive for back pain. Neurological: Negative for light-headedness and headaches. Physical Exam  Cardiovascular:      Rate and Rhythm: Normal rate and regular rhythm. Pulses: Normal pulses. Heart sounds: Murmur (Systolic murmur best heard at base) heard. Pulmonary:      Effort: Pulmonary effort is normal.      Breath sounds: Normal breath sounds. Abdominal:      General: Abdomen is flat. Palpations: Abdomen is soft. Tenderness: There is no abdominal tenderness.    Neurological:      Mental Status: She is alert and oriented to person, place, and time. LABS:    CBC:   Recent Labs     07/05/22  0528 07/05/22  0528 07/05/22  1805 07/06/22  1150 07/07/22  0449   WBC 4.2  --   --  6.4 7.2   HGB 7.9*   < > 8.7* 8.1* 7.6*   HCT 24.2*   < > 26.3* 25.2* 23.3*   PLT 96*  --   --  93* 98*   MCV 85.4  --   --  85.8 84.8    < > = values in this interval not displayed. Renal:    Recent Labs     07/05/22  0528 07/06/22  1149 07/06/22  1150 07/07/22  0449    137  --  143   K 4.0  4.0 4.2  --  4.1    99  --  109   CO2 23 23  --  20*   BUN 32* 37*  --  43*   CREATININE 4.2* 4.2*  --  3.8*   GLUCOSE 94 118*  --  101*   CALCIUM 9.5 9.5  --  8.8   MG 1.70*  --  2.10 2.00   PHOS 4.6  --   --   --    ANIONGAP 9 15  --  14     Hepatic:   Recent Labs     07/05/22 0528 07/06/22  1149   PROT  --  6.0*   LABALBU 3.2*  --      Troponin: No results for input(s): TROPONINI in the last 72 hours. BNP: No results for input(s): BNP in the last 72 hours. Lipids: No results for input(s): CHOL, HDL in the last 72 hours. Invalid input(s): LDLCALCU, TRIGLYCERIDE  ABGs:  No results for input(s): PHART, ZWG3URF, PO2ART, SUF3EDQ, BEART, THGBART, A0MVVQKF, UHB3ECR in the last 72 hours. INR:   Recent Labs     07/05/22  1015   INR 1.17*     Lactate: No results for input(s): LACTATE in the last 72 hours. Cultures:  -----------------------------------------------------------------  RAD:   CT BIOPSY RENAL   Final Result   Impression:   Successful image guided nontarget renal biopsy as described above. CT ABDOMEN PELVIS WO CONTRAST Additional Contrast? None   Final Result      1. No findings for acute intra-abdominal or pelvic abnormality to explain patient's symptoms. 2.  Hepatic cyst.      3.  Small hiatal hernia.           Assessment/Plan:   Patient is a 69 y/o female who a PMHx of THADDEUS, HLD and Osteoporosis who presents to the ED under guidance from her PCP who saw abnormal labs.     Multiple Myeloma  Presence of THADDEUS, Proteinuria, Light chains in urine, anemia and diffuse back pain is all suggestive of Multiple Myeloma  - IgA, IgG and IgM levels all decreased  - Will plan for bone marrow biopsy today to confirm diagnosis  - Per Heme/Onc, patient cleared to be discharged from hospital and will meet in clinic to discuss biopsy results    Acute Kidney Injury 2/2 MM  Creatinine of 3.9 from a baseline of 0.6. 3.8 this am.   -  Protein UR of 200;   -  Light chain and C4 positive - potentially from MM  -  D/C prednisone 40 mg PO qd. -  trend Cr, UOP  -  bladder scan qshift  -  avoid nephrotoxins      Metabolic acidosis  Patient with bicarbonate of 15 on admission. Bicarb now 23. Initially had anion gap up to 24 which has now resolved. May be due to acute renal failure. Was previously receiving NS infusion.  - continue monitor  - switched to [facility]      Anemia 2/2 to Multiple Myeloma  Hemoglobin of 9.7 in ED from baseline ~12, down to 7.9 this am (was 9.2 yesterday). Month long use of NSAIDs and increase pain at night pointed to PUD as possible culprit. However, EGD results came back negative for PUD. Because of positive light chains and C4 in urine, possible that MM is etiology.   - H/H q12, transfuse if below 7  - LR @ 75 cc/hr  - Protonix infusion  - Hold NSAIDs  - EGD came back negative  - Positive Light chain and C4      Hypertension  Blood pressure up to 160  - Labetolol PRN q4h if SBP above 180     Thromboyctopenia  Platelets 98 ([id number] yesterday). Currently no indication for transfusion.  Likely dilutional  - continue monitor    Code Status: Full  FEN: NPO  PPX: SCD  DISPO: Sean Hayes MD, PGY-1  07/07/22  6:46 AM    This patient has been staffed and discussed with Alfred Nation MD. small sips/bites/oral hygiene/allow for swallow between intakes/position upright (90 degrees)

## 2022-07-07 NOTE — CARE COORDINATION
Case Management Assessment            Discharge Note                    Date / Time of Note: 7/7/2022 2:43 PM                  Discharge Note Completed by: VIKKI Garduno    Patient Name: Driss Hess   YOB: 1945  Diagnosis: THADDEUS (acute kidney injury) [facility]) [N17.9]   Date / Time: 7/2/2022 11:00 AM    Current PCP: Lavinia Moon MD  Clinic patient: No    Hospitalization in the last 30 days: No    Advance Directives:  Code Status: Full Code  [place] DNR form completed and on chart: No    Financial:  Payor: Betty Juárez / Plan: Lorena Virk ESSENTIAL/PLUS / Product Type: *No Product type* /      Pharmacy:    [place] [phone] - F [phone]  [address] [place] [address]. [address] [place] [address]  Phone: [phone] Fax: [phone]    Tika Herron [id number] [address] [phone] Laurette Cheadle [phone]  [address] [place] [address]  Phone: [phone] Fax: [phone]      Assistance purchasing medications?:    Assistance provided by Case Management: None at this time    Does patient want to participate in local refill/ meds to beds program?: Yes    Meds To Beds General Rules:  1. Can ONLY be done Monday- Friday between 8:30am-5pm  2. Prescription(s) must be in pharmacy by 3pm to be filled same day  3. Copy of patient's insurance/ prescription drug card and patient face sheet must be sent along with the prescription(s)  4. Cost of Rx cannot be added to hospital bill. If financial assistance is needed, please contact unit  or ;  or  CANNOT provide pharmacy voucher for patients co-pays  5.  Patients can then  the prescription on their way out of the hospital at discharge, or pharmacy can deliver to the bedside if staff is available. (payment due at time of pick-up or delivery - cash, check, or card accepted)     Able to afford home medications/ co-pay costs: Yes    ADLS:  Current PT AM-PAC Score:   /24  Current OT AM-PAC Score:   /24      DISCHARGE Disposition: Home- No Services Needed    LOC at discharge: Not Applicable  TOMMY Completed: No    Notification completed in HENS/PAS?:  Not Applicable    IMM Completed:   Yes, Case management has presented and reviewed IMM letter #2 to the patient and/or family/ POA. Patient and/or family/POA verbalized understanding of their medicare rights and appeal process if needed. Patient and/or family/POA has signed, initialed and placed today's date (7/7/2022) and time [phone]) on IMM letter #2 on the the appropriate lines. Patient and/or family/POA, copy of letter offered and they are aware that this original copy of IMM letter #2 is available prior to discharge from the paper chart on the unit. Electronic documentation has been entered into epic for IMM letter #2 and original paper copy has been added to the paper chart at the nurses station. Transportation:  Transportation PLAN for discharge: family   Mode of Transport: Private Car  Reason for medical transport: Not Applicable  Name of [address] [place] [address], [address]: Not Applicable  Time of Transport: afternoon    Transport form completed: No    Home Care:  [address] ordered at discharge: No  [address]: Not Applicable  Orders faxed: No    Durable Medical Equipment:  DME Provider: none  Equipment obtained during hospitalization: none    Home Oxygen and Respiratory Equipment:  Oxygen needed at discharge?: No  [address]: Not Applicable  Portable tank available for discharge?: No    Dialysis:  Dialysis patient: No    Dialysis Center:  Not Applicable    Hospice Services:  Location: Not Applicable  Agency: Not Applicable    Consents signed: No    Referrals [facility] at [facility] for outpatient continued care:  Not Applicable    Additional CM Notes: Patient to discharge home with no needs. All are in agreement to the patient's discharge plan.     The Plan for Transition of Care is related to the following treatment goals of THADDEUS (acute kidney injury) ([facility] [address] [N17.9]    The Patient and/or patient representative Tawanda Arellano and her family were provided with a choice of provider and agrees with the discharge plan Yes    Freedom of choice list was provided with basic dialogue that supports the patient's individualized plan of care/goals and shares the quality data associated with the providers.  Yes    Care Transitions patient: No    Roula Garduno   Case Management Department  Ph: [phone]  Fax: [phone]

## 2022-07-07 NOTE — FLOWSHEET NOTE
07/07/22 0935   Encounter Summary   Encounter Overview/Reason  Initial Encounter   Service Provided For: Patient and family together   Referral/Consult From: [address] [facility] [address] Children;Spouse; Anabaptism/jose community  (very active in [address] [facility], but wants some privacy around her illness. Has not told them that she is in hospital)   Last Encounter    (es 7/7)   Complexity of Encounter Moderate   Begin Time 0907   End Time  0937   Total Time Calculated 30 min   Assessment/Intervention/Outcome   Assessment Calm;Coping;Peaceful   Intervention Active listening;Discussed belief system/Mandaeism practices/jose;Discussed death, afterlife; Discussed illness injury and its impact; Discussed relationship with God;Explored/Affirmed feelings, thoughts, concerns;Explored Coping Skills/Resources;Nurtured Hope;Prayer (assurance of)/[place]   Outcome Coping;Engaged in conversation;Expressed feelings, needs, and concerns;Expressed Gratitude;Receptive   Plan and Referrals   Plan/Referrals Other (Comment)  (as needed)

## 2022-07-07 NOTE — PROCEDURES
Procedure: Bone Marrow Biopsy and Needle Aspirate   Indication: anemia and suspect multiple myeloma    Anesthesia:  1 mg of po ativan  Lidocaine 1% 10 mL    Patient given risks and benefits of procedure. Consent signed and time out performed. Patient placed in the left lateral decubitus position. Area cleaned and prepped in a sterile fashion with chloraprep. Sterile drape applied. Lidocaine 1% -10ml administered to the subcutaneous tissue and periosteimum of the right iliac crest. Using sterile technique and using an aspirate needle a bone marrow aspirate was performed. A puncture was made with the provided scalpel, then using an Jamshidi needle, a biopsy was taken from the right posterior iliac crest. A sterile bandage was applied. No significant bleeding. Sample sent for histology, flow cytometry, FISH, cytogenetics and molecular studies. Patient tolerated procedure well.      Estimated Blood Loss: 10 cc of aspirate     Saba Hoyos DO

## 2022-07-07 NOTE — CARE COORDINATION
Case Management Assessment           Daily Note                 Date/ Time of Note: 7/7/2022 10:22 AM         Note completed by: VIKKI Magana    Patient Name: Sharyn Kulkarni  YOB: 1945    Diagnosis:THADDEUS (acute kidney injury) [facility]) [N17.9]  Patient Admission Status: Inpatient    Date of Admission:7/2/2022 11:00 AM Length of Stay: 5 GLOS: GMLOS: 3.1    Current Plan of Care: home with no needs  ________________________________________________________________________________________  PT AM-PAC:   / 24 per last evaluation on: none    OT AM-PAC:   / 24 per last evaluation on: none    DME Needs for discharge: nne  ________________________________________________________________________________________  Discharge Plan: Home    Tentative discharge date: tbd     Current barriers to discharge: bm bx, medical clearance    Referrals completed: Not Applicable    Resources/ information provided: Not indicated at this time  ________________________________________________________________________________________  Case Management Notes: Patient to undergo bone marrow bx today. Concerns for possible Multiple Myeloma. Following for possible needs at discharge, but currently no needs anticipated. Zeny Hwang and her family were provided with choice of provider; she and her family are in agreement with the discharge plan.     Care Transition Patient: No    Ayleen Range, [address]  Case Management Department  Ph: [phone]  Fax: [phone]

## 2022-07-07 NOTE — PROGRESS NOTES
Reviewed discharge instructions with patient and  family members. Reviewed discharge medications including dosing, schedule, indication, and adverse reactions. Reviewed which medications were already taken today and next dosage due for each medication. Patient verbalized understanding of all instructions and questions were answered to her. satisfaction. Signed discharge instructions were given to the patient and a copy placed in the paper-lite chart. Patient discharged to home per self with spouse.       Pricilla Cordova RN

## 2022-07-07 NOTE — PLAN OF CARE
Problem: Discharge Planning  Goal: Discharge to home or other facility with appropriate resources  Outcome: Adequate for Discharge     Problem: ABCDS Injury Assessment  Goal: Absence of physical injury  7/7/2022 1340 by Jeaneth Raphael RN  Outcome: Adequate for Discharge     Problem: Pain  Goal: Verbalizes/displays adequate comfort level or baseline comfort level  7/7/2022 1340 by Jeaneth Raphael RN  Outcome: Adequate for Discharge

## 2022-07-07 NOTE — PROGRESS NOTES
[address] Progress note     Attending Physician: No att. providers found    Primary Care: Patricio King MD       Referring MD: No referring provider defined for this encounter. Name: Ernst Curiel :  1945  MRN:  [id number]    Admission: 2022      Date: 2022    Reason for Admission: nausea and abnormal labs     Reason for consult: concern for multiple myeloma    Interval history:  She Is doing well. Ambulating well. Denies any issues.  Renal function is stable, bone marrow bx today    Past Surgical History:   Procedure Laterality Date    COLONOSCOPY  Dec., 2002 (  )    Dr. Frederic Lizarraga - normal.    COLONOSCOPY  Oct., 2013 (  )    Darral Quincy - Normal    COLONOSCOPY N/A 2022    COLONOSCOPY POLYPECTOMY ABLATION performed by Dolores Parish MD at [address] N/A 2022    COLONOSCOPY POLYPECTOMY SNARE/COLD BIOPSY performed by Dolores Parish MD at [address] RENAL  2022    CT BIOPSY RENAL 2022 [facility] CT SCAN    CYSTOSCOPY  *May 14, 2020    Dr. Geremias Wolfe - negative    UPPER GASTROINTESTINAL ENDOSCOPY N/A 2022    EGD BIOPSY performed by Dolores Parish MD at [facility] ENDOSCOPY       Past Medical History:   Diagnosis Date    THADDEUS (acute kidney injury) ([facility] [address] 2022    Bilateral carotid artery stenosis 2022    Bilateral carotid artery stenosis 2022    Encounter for cervical Pap smear with pelvic exam *Oct. 11,2019    Negative     Hyperlipidemia     Multiple myeloma ([facility] [address] 2022    Osteoporosis DEXA - 2011    Lumbar T score -2.5 and frm neck -1.8    Screening mammogram for high-risk patient November 10, 2011    Negative    Screening mammogram, encounter for *2017    Negative    Screening mammogram, encounter for *2018    Negative    Screening mammogram, encounter for *2019    Negative    Screening mammogram, encounter for *Leidy 15, 2020    Negative    Vitamin D deficiency     Wrist fracture, left Mar., 2016    Left       Prior to Admission medications    Medication Sig Start Date End Date Taking? Authorizing Provider   hydrOXYzine HCl (ATARAX) 10 MG tablet Take 1 tablet by mouth 3 times daily as needed for Anxiety 22 Yes Carl Shirley MD   pantoprazole (PROTONIX) 40 MG tablet Take 1 tablet by mouth daily (with breakfast)  Patient not taking: Reported on 2022 6/15/22   Mo Centeno MD   atorvastatin (LIPITOR) 20 MG tablet TAKE ONE TABLET BY MOUTH DAILY 21   Mo Centeno MD       No Known Allergies    Family History   Problem Relation Age of Onset    Hypertension Father 80        , hypertension, facial tumor.  Cancer Mother 80        , carcinoma of the lung.  Cancer Brother         prostate    Breast Cancer Daughter 39        Social History     Socioeconomic History    Marital status:      Spouse name: Marcial Rabago Number of children: 3    Years of education: Not on file    Highest education level: Not on file   Occupational History    Occupation: part time seamstress   Tobacco Use    Smoking status: Never Smoker    Smokeless tobacco: Never Used   Vaping Use    Vaping Use: Never used   Substance and Sexual Activity    Alcohol use: Yes     Alcohol/week: 0.0 standard drinks     Comment: occasional wine.  Drug use: Not on file    Sexual activity: Not on file   Other Topics Concern    Not on file   Social History Narrative    Living Will:  No.    Happily  and 3 kids here    Cooks and bakes,gardens--[employer]    Walks daily,yoga         Social Determinants of Health     Financial Resource Strain: Low Risk     Difficulty of Paying Living Expenses: Not hard at all   Food Insecurity: No Food Insecurity    Worried About Running Out of Food in the Last Year: Never true    [address] in the Last Year: Never true   Transportation Needs:     Lack of Transportation (Medical):  Not on file  Lack of Transportation (Non-Medical): Not on file   Physical Activity:     Days of Exercise per Week: Not on file    Minutes of Exercise per Session: Not on file   Stress:     Feeling of Stress : Not on file   Social Connections:     Frequency of Communication with Friends and Family: Not on file    Frequency of Social Gatherings with Friends and Family: Not on file    Attends Congregation Services: Not on file    Active Member of [employer] or Organizations: Not on file    Attends Club or Organization Meetings: Not on file    Marital Status: Not on file   Intimate Partner Violence:     Fear of Current or Ex-Partner: Not on file    Emotionally Abused: Not on file    Physically Abused: Not on file    Sexually Abused: Not on file   Housing Stability:     Unable to Pay for Housing in the Last Year: Not on file    Number of [place] in the Last Year: Not on file    Unstable Housing in the Last Year: Not on file        ROS:  As noted above, otherwise remainder of 10-point ROS negative    Physical Exam:     Vital Signs:  /66   Pulse 94   Temp 97.8 °F (36.6 °C) (Oral)   Resp 19   Wt 127 lb 3.2 oz (57.7 kg)   SpO2 96%   BMI 24.84 kg/m²     Weight:    Wt Readings from Last 3 Encounters:   07/07/22 127 lb 3.2 oz (57.7 kg)   07/01/22 121 lb (54.9 kg)   06/15/22 127 lb 6.4 oz (57.8 kg)       General: Awake, alert and oriented.   HEENT: normocephalic, PERRL, no scleral erythema or icterus, Oral mucosa moist and intact, throat clear  NECK: supple without palpable adenopathy  BACK: Straight negative CVAT  SKIN: warm dry and intact without lesions rashes or masses  CHEST: CTA bilaterally without use of accessory muscles  CV: Normal S1 S2, RRR, no MRG  ABD: NT ND normoactive BS, no palpable masses or hepatosplenomegaly  EXTREMITIES: without edema, denies calf tenderness  NEURO: CN II - XII grossly intact      Laboratory Data:   CBC:   Recent Labs     07/05/22  0528 07/05/22  0528 07/05/22  1805 07/06/22  1150 07/07/22  0449   WBC 4.2  --   --  6.4 7.2   HGB 7.9*   < > 8.7* 8.1* 7.6*   HCT 24.2*   < > 26.3* 25.2* 23.3*   MCV 85.4  --   --  85.8 84.8   PLT 96*  --   --  93* 98*    < > = values in this interval not displayed. BMP/Mag:  Recent Labs     07/05/22  0528 07/06/22  1149 07/06/22  1150 07/07/22  0449    137  --  143   K 4.0  4.0 4.2  --  4.1    99  --  109   CO2 23 23  --  20*   PHOS 4.6  --   --   --    BUN 32* 37*  --  43*   CREATININE 4.2* 4.2*  --  3.8*   MG 1.70*  --  2.10 2.00     LIVP: No results for input(s): AST, ALT, LIPASE, BILIDIR, BILITOT, ALKPHOS in the last 72 hours. Invalid input(s): AMYLASE,  ALB  Coags:   Recent Labs     07/05/22  1015   PROTIME 14.9*   INR 1.17*     Uric Acid No results for input(s): LABURIC in the last 72 hours. PROBLEM LIST:                   TREATMENT:                      ASSESSMENT AND PLAN:           Ms. Milla Oglesby is a 66-year-old female with no past medical history who presented to the emergency room department with renal failure    1) Suspected Multiple myeloma  - Pt presented with renal failure normocytic anemia   - Underwent a renal bx 7/5/22 results pending   - Serum light chain with ratio <0 and lambda light chain >5000  - SPEP, ADA and immunoglobulins are pending   - Pt does have diffuse bone pain but no particular location for back pain  - bone marrow bx today    -ok to DC home and will see in clinic on 7/19 to discuss results     2) Renal failure/ THADDEUS  - no signs of volume overload   - has 4 g of proteinuria.   - Will follow up biopsy  - no emergent indication for dialysis at this time  - monitor for now    Earlene Fonseca DO

## 2022-07-08 LAB
ALBUMIN SERPL-MCNC: 2.9 G/DL (ref 3.1–4.9)
ALPHA-1-GLOBULIN: 0.4 G/DL (ref 0.2–0.4)
ALPHA-2-GLOBULIN: 1.1 G/DL (ref 0.4–1.1)
BETA GLOBULIN: 1.2 G/DL (ref 0.9–1.6)
GAMMA GLOBULIN: 0.4 G/DL (ref 0.6–1.8)
MYELOPEROXIDASE AB: 0 AU/ML (ref 0–19)
SERINE PROTEASE 3 AB: 1 AU/ML (ref 0–19)
SPE/IFE INTERPRETATION: NORMAL
TOTAL PROTEIN: 6 G/DL (ref 6.4–8.2)

## 2022-07-08 NOTE — PROGRESS NOTES
Nephrology  Note                                                                                                                                                                                                                                                                                                                                                               Office : [phone]     Fax :[phone]              Patient's Name: Sharyn Kulkarni      07/07/22    Cr stable    Acidosis better   Ur studies - 4.4 gm         Past Medical History:   Diagnosis Date    THADDEUS (acute kidney injury) ([facility] [address] 7/2/2022    Bilateral carotid artery stenosis 4/14/2022    Bilateral carotid artery stenosis 4/14/2022    Encounter for cervical Pap smear with pelvic exam *Oct. 11,2019    Negative     Hyperlipidemia     Multiple myeloma ([facility] [address] 7/6/2022    Osteoporosis DEXA - Sept., 2011    Lumbar T score -2.5 and frm neck -1.8    Screening mammogram for high-risk patient November 10, 2011    Negative    Screening mammogram, encounter for *March 8, 2017    Negative    Screening mammogram, encounter for *March 14, 2018    Negative    Screening mammogram, encounter for *March 20, 2019    Negative    Screening mammogram, encounter for *Leidy 15, 2020    Negative    Vitamin D deficiency     Wrist fracture, left Mar., 2016    Left       Past Surgical History:   Procedure Laterality Date    COLONOSCOPY  Dec., 2002 ( 2012 )    Dr. Jack Castillo - normal.    COLONOSCOPY  Oct., 2013 ( 2023 )    Ariana Lau - Normal    COLONOSCOPY N/A 7/6/2022    COLONOSCOPY POLYPECTOMY ABLATION performed by Joyce Hdz MD [address] N/A 7/6/2022    COLONOSCOPY POLYPECTOMY SNARE/COLD BIOPSY performed by Joyce Hdz MD at [address] RENAL  7/5/2022    CT BIOPSY RENAL 7/5/2022 [facility] SC[facility]    CYSTOSCOPY  *May 14, 2020    Dr. Ileana Swanson - negative    UPPER GASTROINTESTINAL ENDOSCOPY N/A 7/5/2022    EGD BIOPSY [facility] Randy Hdz MD [facility] ENDOSCOPY       Family History   Problem Relation Age of Onset    Hypertension Father 80        , hypertension, facial tumor.  Cancer Mother 80        , carcinoma of the lung.  Cancer Brother         prostate    Breast Cancer Daughter 39        reports that she has never smoked. She has never used smokeless tobacco. She reports current alcohol use. Allergies:  Patient has no known allergies. Current Medications:    No current facility-administered medications for this encounter. Review of Systems:   14 point ROS obtained but were negative except mentioned in HPI      Physical exam:     Vitals:  /66   Pulse 94   Temp 97.8 °F (36.6 °C) (Oral)   Resp 19   Wt 127 lb 3.2 oz (57.7 kg)   SpO2 96%   BMI 24.84 kg/m²   Constitutional:  OAA X3 NAD  Skin: no rash, turgor wnl  Heent:  eomi, mmm  Neck: no bruits or jvd noted  Cardiovascular:  S1, S2 without m/r/g  Respiratory: CTA B without w/r/r  Abdomen:  +bs, soft, nt, nd  Ext: + lower extremity edema  Psychiatric: mood and affect appropriate  Musculoskeletal:  Rom, muscular strength intact    Data:   Labs:  CBC:   Recent Labs     22  1805 22   WBC 4.2  --   --  6.4 7.2   HGB 7.9*   < > 8.7* 8.1* 7.6*   PLT 96*  --   --  93* 98*    < > = values in this interval not displayed. BMP:    Recent Labs     22  11422    137 143   K 4.0  4.0 4.2 4.1    99 109   CO2 23 23 20*   BUN 32* 37* 43*   CREATININE 4.2* 4.2* 3.8*   GLUCOSE 94 118* 101*     Ca/Mg/Phos:   Recent Labs     22  11422  11522   CALCIUM 9.5 9.5  --  8.8   MG 1.70*  --  2.10 2.00   PHOS 4.6  --   --   --      Hepatic:   No results for input(s): AST, ALT, ALB, BILITOT, ALKPHOS in the last 72 hours. Troponin: No results for input(s): TROPONINI in the last 72 hours.   BNP: No results for input(s): BNP in the last 72 hours. Lipids: No results for input(s): CHOL, TRIG, HDL, LDLCALC, LABVLDL in the last 72 hours. ABGs: No results for input(s): PHART, PO2ART, [id number] in the last 72 hours. INR:   Recent Labs     07/05/22  1015   INR 1.17*     UA:  No results for input(s): COLORU, CLARITYU, GLUCOSEU, BILIRUBINUR, KETUA, SPECGRAV, BLOODU, PHUR, PROTEINU, UROBILINOGEN, NITRU, LEUKOCYTESUR, Denis Stephanie in the last 72 hours. Urine Microscopic:   No results for input(s): LABCAST, BACTERIA, COMU, HYALCAST, WBCUA, RBCUA, EPIU in the last 72 hours. Urine Culture: No results for input(s): LABURIN in the last 72 hours. Urine Chemistry:   No results for input(s): Keya Óscar, PROTEINUR, NAUR in the last 72 hours. IMAGING:  CT BIOPSY RENAL   Final Result   Impression:   Successful image guided nontarget renal biopsy as described above. CT ABDOMEN PELVIS WO CONTRAST Additional Contrast? None   Final Result      1. No findings for acute intra-abdominal or pelvic abnormality to explain patient's symptoms. 2.  Hepatic cyst.      3.  Small hiatal hernia. Assessment/Plan   1. THADDEUS - NSAID use     2. HTN    3. Anemia    4. Acid- base/ Electrolyte imbalance     5.  Possible GIB     PLAN   - renal bx  - Cast nephropathy   - FLC ratio - 0   - hematology consult - BM bx today   - Ur studies - 4 gms proteinuria  - check serology    - stop NSAID's   - dc IVF   - No obstruction       Recommend to dose adjust all medications  based on renal functions  Maintain SBP> 90 mmHg   Daily weights   AVOID NSAIDs  Avoid Nephrotoxins  Monitor Intake/Output  Call if significant decrease in urine output                 Thank you for allowing us to participate in care of Bianka Tate MD  Feel free to contact me   [facility] [address]  Office : [phone]  Fax :[phone]

## 2022-07-11 LAB — CRYOGLOBULIN, QUALITATIVE: NORMAL

## 2022-07-12 ENCOUNTER — TELEPHONE (OUTPATIENT)
Dept: FAMILY MEDICINE CLINIC | Age: 77
End: 2022-07-12

## 2022-07-12 DIAGNOSIS — R35.0 URINARY FREQUENCY: Primary | ICD-10-CM

## 2022-07-12 NOTE — TELEPHONE ENCOUNTER
Pt called to say that she's having some stomach problems and wants to know if she can take hydroxyzine along with her pantoprazole. Please call pt.     LOV 07/01/2022

## 2022-07-12 NOTE — TELEPHONE ENCOUNTER
Called to inform pt that medications are safe to take together. Pt stated that she is having frequent urination at night time only, waking up every hour to urinate.

## 2022-07-13 ENCOUNTER — TELEPHONE (OUTPATIENT)
Dept: FAMILY MEDICINE CLINIC | Age: 77
End: 2022-07-13

## 2022-07-13 ENCOUNTER — NURSE ONLY (OUTPATIENT)
Dept: FAMILY MEDICINE CLINIC | Age: 77
End: 2022-07-13
Payer: MEDICARE

## 2022-07-13 DIAGNOSIS — N39.0 URINARY TRACT INFECTION WITH HEMATURIA, SITE UNSPECIFIED: ICD-10-CM

## 2022-07-13 DIAGNOSIS — R35.0 URINARY FREQUENCY: ICD-10-CM

## 2022-07-13 DIAGNOSIS — R35.0 FREQUENT URINATION: Primary | ICD-10-CM

## 2022-07-13 DIAGNOSIS — R31.9 URINARY TRACT INFECTION WITH HEMATURIA, SITE UNSPECIFIED: ICD-10-CM

## 2022-07-13 LAB
BILIRUBIN, POC: NORMAL
BLOOD URINE, POC: NORMAL
CLARITY, POC: CLEAR
COLOR, POC: NORMAL
GLUCOSE URINE, POC: NORMAL
KETONES, POC: NORMAL
LEUKOCYTE EST, POC: 15
NITRITE, POC: NORMAL
PH, POC: 5
PROTEIN, POC: 30
SPECIFIC GRAVITY, POC: 1.01
UROBILINOGEN, POC: NORMAL

## 2022-07-13 PROCEDURE — 81002 URINALYSIS NONAUTO W/O SCOPE: CPT | Performed by: FAMILY MEDICINE

## 2022-07-13 NOTE — TELEPHONE ENCOUNTER
Pt needs Dr. Felipa Lawrence to call her. Advised her that we were sending her urine off for culture as it did not show much under microscope. She is saying that she feels she will become dehydrated as her body is achy, she is not sleeping, and urinating every hour. She needs Dr. Felipa Lawrence to tell her what else she can do. Please call patient.

## 2022-07-15 ENCOUNTER — TELEPHONE (OUTPATIENT)
Dept: FAMILY MEDICINE CLINIC | Age: 77
End: 2022-07-15

## 2022-07-15 LAB
ORGANISM: ABNORMAL
URINE CULTURE, ROUTINE: ABNORMAL

## 2022-07-18 ENCOUNTER — OFFICE VISIT (OUTPATIENT)
Dept: FAMILY MEDICINE CLINIC | Age: 77
End: 2022-07-18
Payer: MEDICARE

## 2022-07-18 VITALS
DIASTOLIC BLOOD PRESSURE: 70 MMHG | HEIGHT: 60 IN | SYSTOLIC BLOOD PRESSURE: 120 MMHG | BODY MASS INDEX: 23.91 KG/M2 | WEIGHT: 121.8 LBS | HEART RATE: 116 BPM | OXYGEN SATURATION: 98 %

## 2022-07-18 DIAGNOSIS — N17.9 ACUTE RENAL FAILURE SUPERIMPOSED ON STAGE 4 CHRONIC KIDNEY DISEASE, UNSPECIFIED ACUTE RENAL FAILURE TYPE (HCC): ICD-10-CM

## 2022-07-18 DIAGNOSIS — K29.80 HELICOBACTER PYLORI DUODENITIS: ICD-10-CM

## 2022-07-18 DIAGNOSIS — N18.4 ACUTE RENAL FAILURE SUPERIMPOSED ON STAGE 4 CHRONIC KIDNEY DISEASE, UNSPECIFIED ACUTE RENAL FAILURE TYPE (HCC): ICD-10-CM

## 2022-07-18 DIAGNOSIS — R10.13 EPIGASTRIC PAIN: ICD-10-CM

## 2022-07-18 DIAGNOSIS — C90.00 MULTIPLE MYELOMA NOT HAVING ACHIEVED REMISSION (HCC): ICD-10-CM

## 2022-07-18 DIAGNOSIS — N17.9 ACUTE RENAL FAILURE, UNSPECIFIED ACUTE RENAL FAILURE TYPE (HCC): ICD-10-CM

## 2022-07-18 DIAGNOSIS — N17.9 AKI (ACUTE KIDNEY INJURY) (HCC): ICD-10-CM

## 2022-07-18 DIAGNOSIS — C90.00 MULTIPLE MYELOMA NOT HAVING ACHIEVED REMISSION (HCC): Primary | ICD-10-CM

## 2022-07-18 DIAGNOSIS — B96.81 HELICOBACTER PYLORI DUODENITIS: ICD-10-CM

## 2022-07-18 DIAGNOSIS — H54.61 VISUAL LOSS, RIGHT EYE: ICD-10-CM

## 2022-07-18 LAB
A/G RATIO: 1.9 (ref 1.1–2.2)
ALBUMIN SERPL-MCNC: 4.4 G/DL (ref 3.4–5)
ALP BLD-CCNC: 84 U/L (ref 40–129)
ALT SERPL-CCNC: 9 U/L (ref 10–40)
ANION GAP SERPL CALCULATED.3IONS-SCNC: 20 MMOL/L (ref 3–16)
AST SERPL-CCNC: 15 U/L (ref 15–37)
BASOPHILS ABSOLUTE: 0.1 K/UL (ref 0–0.2)
BASOPHILS RELATIVE PERCENT: 0.8 %
BILIRUB SERPL-MCNC: <0.2 MG/DL (ref 0–1)
BUN BLDV-MCNC: 47 MG/DL (ref 7–20)
CALCIUM SERPL-MCNC: 10.3 MG/DL (ref 8.3–10.6)
CHLORIDE BLD-SCNC: 106 MMOL/L (ref 99–110)
CO2: 18 MMOL/L (ref 21–32)
CREAT SERPL-MCNC: 4.8 MG/DL (ref 0.6–1.2)
EOSINOPHILS ABSOLUTE: 0.1 K/UL (ref 0–0.6)
EOSINOPHILS RELATIVE PERCENT: 1.6 %
GFR AFRICAN AMERICAN: 11
GFR NON-AFRICAN AMERICAN: 9
GLUCOSE BLD-MCNC: 102 MG/DL (ref 70–99)
HCT VFR BLD CALC: 24.7 % (ref 36–48)
HEMOGLOBIN: 8.1 G/DL (ref 12–16)
LYMPHOCYTES ABSOLUTE: 1.5 K/UL (ref 1–5.1)
LYMPHOCYTES RELATIVE PERCENT: 23.9 %
Lab: NORMAL
MCH RBC QN AUTO: 28.1 PG (ref 26–34)
MCHC RBC AUTO-ENTMCNC: 32.8 G/DL (ref 31–36)
MCV RBC AUTO: 85.5 FL (ref 80–100)
MONOCYTES ABSOLUTE: 0.7 K/UL (ref 0–1.3)
MONOCYTES RELATIVE PERCENT: 11.5 %
NEUTROPHILS ABSOLUTE: 3.8 K/UL (ref 1.7–7.7)
NEUTROPHILS RELATIVE PERCENT: 62.2 %
PDW BLD-RTO: 15.1 % (ref 12.4–15.4)
PLATELET # BLD: 241 K/UL (ref 135–450)
PMV BLD AUTO: 7.3 FL (ref 5–10.5)
POTASSIUM SERPL-SCNC: 4.2 MMOL/L (ref 3.5–5.1)
RBC # BLD: 2.89 M/UL (ref 4–5.2)
REPORT: NORMAL
SODIUM BLD-SCNC: 144 MMOL/L (ref 136–145)
THIS TEST SENT TO: NORMAL
TOTAL PROTEIN: 6.7 G/DL (ref 6.4–8.2)
WBC # BLD: 6.1 K/UL (ref 4–11)

## 2022-07-18 PROCEDURE — 1123F ACP DISCUSS/DSCN MKR DOCD: CPT | Performed by: FAMILY MEDICINE

## 2022-07-18 PROCEDURE — 99214 OFFICE O/P EST MOD 30 MIN: CPT | Performed by: FAMILY MEDICINE

## 2022-07-18 ASSESSMENT — ENCOUNTER SYMPTOMS
RHINORRHEA: 0
BLOOD IN STOOL: 0
SORE THROAT: 0
RECTAL PAIN: 0
HEMATOCHEZIA: 0
BELCHING: 0
DIARRHEA: 0
VOICE CHANGE: 0
STRIDOR: 0
NAUSEA: 0
CHOKING: 0
EYE DISCHARGE: 0
COLOR CHANGE: 0
SINUS PRESSURE: 0
ABDOMINAL PAIN: 1
FLATUS: 0
ABDOMINAL DISTENTION: 0
EYE REDNESS: 0
BACK PAIN: 0
CHEST TIGHTNESS: 0
TROUBLE SWALLOWING: 0
EYE ITCHING: 0
EYE PAIN: 0
COUGH: 0
FACIAL SWELLING: 0
CONSTIPATION: 1
PHOTOPHOBIA: 0
ANAL BLEEDING: 0
VOMITING: 0
SHORTNESS OF BREATH: 0
APNEA: 0
WHEEZING: 0

## 2022-07-18 ASSESSMENT — PATIENT HEALTH QUESTIONNAIRE - PHQ9
SUM OF ALL RESPONSES TO PHQ QUESTIONS 1-9: 0
1. LITTLE INTEREST OR PLEASURE IN DOING THINGS: 0
SUM OF ALL RESPONSES TO PHQ QUESTIONS 1-9: 0
2. FEELING DOWN, DEPRESSED OR HOPELESS: 0
SUM OF ALL RESPONSES TO PHQ9 QUESTIONS 1 & 2: 0

## 2022-07-18 ASSESSMENT — CROHNS DISEASE ACTIVITY INDEX (CDAI): CDAI SCORE: 0

## 2022-07-18 NOTE — PROGRESS NOTES
Subjective:     Patient Cathy Sorensen is a 68 y.o. female. Abdominal Pain  This is a new problem. The current episode started 1 to 4 weeks ago. The onset quality is gradual. The problem occurs intermittently. The problem has been gradually improving. The pain is located in the epigastric region. The pain is mild. The quality of the pain is burning. The abdominal pain does not radiate. Associated symptoms include anorexia and constipation. Pertinent negatives include no arthralgias, belching, diarrhea, dysuria, fever, flatus, frequency, headaches, hematochezia, hematuria, melena, myalgias, nausea, vomiting or weight loss. Exacerbated by: H pylori. Relieved by: h pylori meds. Treatments tried: above and protonix. Prior diagnostic workup includes lower endoscopy and upper endoscopy. There is no history of abdominal surgery, colon cancer, Crohn's disease, gallstones, GERD, irritable bowel syndrome, pancreatitis, PUD or ulcerative colitis. MM, Hpylori duodenitis     No Known Allergies    Current Outpatient Medications   Medication Sig Dispense Refill    pantoprazole (PROTONIX) 40 MG tablet Take 1 tablet by mouth daily (with breakfast) 30 tablet 3    atorvastatin (LIPITOR) 20 MG tablet TAKE ONE TABLET BY MOUTH DAILY 90 tablet 3     No current facility-administered medications for this visit.        Past Medical History:   Diagnosis Date    THADDEUS (acute kidney injury) ([facility] [address] 7/2/2022    Bilateral carotid artery stenosis 4/14/2022    Bilateral carotid artery stenosis 4/14/2022    Encounter for cervical Pap smear with pelvic exam *Oct. 11,2019    Negative     Hyperlipidemia     Multiple myeloma ([facility] [address] 7/6/2022    Osteoporosis DEXA - Sept., 2011    Lumbar T score -2.5 and frm neck -1.8    Screening mammogram for high-risk patient November 10, 2011    Negative    Screening mammogram, encounter for *March 8, 2017    Negative    Screening mammogram, encounter for *March 14, 2018    Negative    Screening mammogram, encounter for *March 20, 2019    Negative    Screening mammogram, encounter for *Leidy 15, 2020    Negative    Vitamin D deficiency     Wrist fracture, left Mar., 2016    Left       Past Surgical History:   Procedure Laterality Date    COLONOSCOPY  Dec., 2002 ( 2012 )    Dr. Kiki Patel - normal.    COLONOSCOPY  Oct., 2013 ( 2023 )    Miky Piedra - Normal    COLONOSCOPY N/A 7/6/2022    COLONOSCOPY POLYPECTOMY ABLATION performed by Tita Olea MD at [address] N/A 7/6/2022    COLONOSCOPY POLYPECTOMY SNARE/COLD [facility]OPSY [facility] [address] RENAL  7/5/2022    CT BIOPSY RENAL 7/5/2022 [facility] CT SCAN    CYSTOSCOPY  *May 14, 2020    Dr. Zulema Roth - negative    UPPER GASTROINTESTINAL ENDOSCOPY N/A 7/5/2022    EGD BIOPSY performed by Tita Olea MD at [address] History     Socioeconomic History    Marital status:      Spouse name: Marta Mercedes    Number of children: 3    Years of education: Not on file    Highest education level: Not on file   Occupational History    Occupation: part time seamstress   Tobacco Use    Smoking status: Never    Smokeless tobacco: Never   Vaping Use    Vaping Use: Never used   Substance and Sexual Activity    Alcohol use: Yes     Alcohol/week: 0.0 standard drinks     Comment: occasional wine.     Drug use: Not on file    Sexual activity: Not on file   Other Topics Concern    Not on file   Social History Narrative    Living Will:  No.    Happily  and 3 kids here    Cooks and bakes,gardens--[employer]    Walks daily,yoga         Social Determinants of Health     Financial Resource Strain: Low Risk     Difficulty of Paying Living Expenses: Not hard at all   Food Insecurity: No Food Insecurity    Worried About Running Out of Food in the Last Year: Never true    Ran Out of Food in the Last Year: Never true   Transportation Needs: Not on file   Physical Activity: Not on file   Stress: Not on file   Social Connections: Not on file   Intimate Partner Violence: Not on file   Housing Stability: Not on file       Family History   Problem Relation Age of Onset    Hypertension Father 80        , hypertension, facial tumor. Cancer Mother 80        , carcinoma of the lung. Cancer Brother         prostate    Breast Cancer Daughter 39       Immunization History   Administered Date(s) Administered    COVID-19, PFIZER PURPLE top, DILUTE for use, (age 15 y+), 30mcg/0.3mL 2021, 2021, 11/10/2021    Influenza, High Dose (Fluzone 65 yrs and older) 10/15/2017, 2018, 10/11/2018, 2019    Influenza, Carlisle Prachi, adjuvanted, 65 yrs +, IM, PF (Fluad) 10/20/2020, 2021    Pneumococcal Conjugate 13-valent ([id number]) 2016    Pneumococcal Polysaccharide ([id number]) 2011, 10/15/2017    Td, unspecified formulation 04/15/2008       Review of Systems  Review of Systems   Constitutional:  Negative for activity change, appetite change, chills, diaphoresis, fatigue, fever, unexpected weight change and weight loss. HENT:  Negative for congestion, dental problem, drooling, ear discharge, ear pain, facial swelling, hearing loss, mouth sores, nosebleeds, postnasal drip, rhinorrhea, sinus pressure, sneezing, sore throat, tinnitus, trouble swallowing and voice change. Eyes:  Negative for photophobia, pain, discharge, redness, itching and visual disturbance. Respiratory:  Negative for apnea, cough, choking, chest tightness, shortness of breath, wheezing and stridor. Cardiovascular:  Negative for chest pain, palpitations and leg swelling. Gastrointestinal:  Positive for abdominal pain, anorexia and constipation. Negative for abdominal distention, anal bleeding, blood in stool, diarrhea, flatus, hematochezia, melena, nausea, rectal pain and vomiting. Genitourinary:  Negative for difficulty urinating, dysuria, enuresis, flank pain, frequency, genital sores and hematuria.    Musculoskeletal:  Negative for arthralgias, back pain, gait problem, joint swelling, myalgias, neck pain and neck stiffness. Skin:  Negative for color change, pallor, rash and wound. Neurological:  Negative for dizziness, tremors, seizures, syncope, facial asymmetry, speech difficulty, weakness, light-headedness, numbness and headaches. Hematological:  Negative for adenopathy. Does not bruise/bleed easily. Psychiatric/Behavioral:  Negative for agitation, behavioral problems, confusion, decreased concentration, dysphoric mood, hallucinations, self-injury, sleep disturbance and suicidal ideas. The patient is not nervous/anxious and is not hyperactive. Objective:   Physical Exam  Physical Exam  Vitals reviewed. Constitutional:       General: She is not in acute distress. Appearance: She is well-developed. HENT:      Head: Normocephalic. Right Ear: Tympanic membrane and ear canal normal.      Left Ear: Tympanic membrane and ear canal normal.      Nose: No rhinorrhea. Mouth/Throat:      Pharynx: No oropharyngeal exudate or posterior oropharyngeal erythema. Eyes:      General:         Right eye: No discharge. Left eye: No discharge. Conjunctiva/sclera: Conjunctivae normal.      Pupils: Pupils are equal, round, and reactive to light. Neck:      Thyroid: No thyromegaly. Vascular: No carotid bruit or JVD. Trachea: No tracheal deviation. Cardiovascular:      Rate and Rhythm: Normal rate and regular rhythm. Pulses: Normal pulses. Heart sounds: Normal heart sounds. No murmur heard. No gallop. Pulmonary:      Effort: Pulmonary effort is normal. No respiratory distress. Breath sounds: Normal breath sounds. No stridor. No wheezing or rales. Chest:      Chest wall: No tenderness. Abdominal:      General: Bowel sounds are normal. There is no distension. Palpations: Abdomen is soft. There is no mass. Tenderness: There is no abdominal tenderness.  There is no right CVA

## 2022-07-21 ENCOUNTER — TELEPHONE (OUTPATIENT)
Dept: FAMILY MEDICINE CLINIC | Age: 77
End: 2022-07-21

## 2022-08-01 ENCOUNTER — TELEPHONE (OUTPATIENT)
Dept: CT IMAGING | Age: 77
End: 2022-08-01

## 2022-08-02 ENCOUNTER — HOSPITAL ENCOUNTER (OUTPATIENT)
Dept: GENERAL RADIOLOGY | Age: 77
Discharge: HOME | End: 2022-08-02
Payer: MEDICARE

## 2022-08-02 ENCOUNTER — HOSPITAL ENCOUNTER (OUTPATIENT)
Dept: CT IMAGING | Age: 77
Discharge: HOME | End: 2022-08-02
Payer: MEDICARE

## 2022-08-02 VITALS
SYSTOLIC BLOOD PRESSURE: 138 MMHG | OXYGEN SATURATION: 100 % | RESPIRATION RATE: 20 BRPM | TEMPERATURE: 99.3 F | HEART RATE: 102 BPM | DIASTOLIC BLOOD PRESSURE: 71 MMHG

## 2022-08-02 DIAGNOSIS — R91.1 PULMONARY NODULE: ICD-10-CM

## 2022-08-02 LAB
APTT: 23.2 SEC (ref 23–34.3)
INR BLD: 1.03 (ref 0.87–1.14)
PLATELET # BLD: 209 K/UL (ref 135–450)
PROTHROMBIN TIME: 13.4 SEC (ref 11.7–14.5)

## 2022-08-02 PROCEDURE — 88342 IMHCHEM/IMCYTCHM 1ST ANTB: CPT

## 2022-08-02 PROCEDURE — 2500000003 HC RX 250 WO HCPCS: Performed by: RADIOLOGY

## 2022-08-02 PROCEDURE — 85049 AUTOMATED PLATELET COUNT: CPT

## 2022-08-02 PROCEDURE — 85610 PROTHROMBIN TIME: CPT

## 2022-08-02 PROCEDURE — 88341 IMHCHEM/IMCYTCHM EA ADD ANTB: CPT

## 2022-08-02 PROCEDURE — 88305 TISSUE EXAM BY PATHOLOGIST: CPT

## 2022-08-02 PROCEDURE — 6360000002 HC RX W HCPCS: Performed by: RADIOLOGY

## 2022-08-02 PROCEDURE — 7100000010 HC PHASE II RECOVERY - FIRST 15 MIN

## 2022-08-02 PROCEDURE — 36415 COLL VENOUS BLD VENIPUNCTURE: CPT

## 2022-08-02 PROCEDURE — 71045 X-RAY EXAM CHEST 1 VIEW: CPT

## 2022-08-02 PROCEDURE — 85730 THROMBOPLASTIN TIME PARTIAL: CPT

## 2022-08-02 PROCEDURE — 2709999900 CT NEEDLE BIOPSY LUNG PERCUTANEOUS W IMAGING GUIDANCE

## 2022-08-02 PROCEDURE — 7100000011 HC PHASE II RECOVERY - ADDTL 15 MIN

## 2022-08-02 RX ORDER — FENTANYL CITRATE 50 UG/ML
50 INJECTION, SOLUTION INTRAMUSCULAR; INTRAVENOUS ONCE
Status: COMPLETED | OUTPATIENT
Start: 2022-08-02 | End: 2022-08-02

## 2022-08-02 RX ORDER — MIDAZOLAM HYDROCHLORIDE 1 MG/ML
1 INJECTION INTRAMUSCULAR; INTRAVENOUS ONCE
Status: COMPLETED | OUTPATIENT
Start: 2022-08-02 | End: 2022-08-02

## 2022-08-02 RX ORDER — LIDOCAINE HYDROCHLORIDE 10 MG/ML
10 INJECTION, SOLUTION EPIDURAL; INFILTRATION; INTRACAUDAL; PERINEURAL ONCE
Status: COMPLETED | OUTPATIENT
Start: 2022-08-02 | End: 2022-08-02

## 2022-08-02 RX ADMIN — FENTANYL CITRATE 25 MCG: 50 INJECTION, SOLUTION INTRAMUSCULAR; INTRAVENOUS at 11:21

## 2022-08-02 RX ADMIN — MIDAZOLAM HYDROCHLORIDE 0.5 MG: 2 INJECTION, SOLUTION INTRAMUSCULAR; INTRAVENOUS at 11:20

## 2022-08-02 RX ADMIN — LIDOCAINE HYDROCHLORIDE 10 ML: 10 INJECTION, SOLUTION EPIDURAL; INFILTRATION; INTRACAUDAL; PERINEURAL at 11:23

## 2022-08-02 ASSESSMENT — PAIN SCALES - GENERAL
PAINLEVEL_OUTOF10: 0
PAINLEVEL_OUTOF10: 1
PAINLEVEL_OUTOF10: 0
PAINLEVEL_OUTOF10: 1

## 2022-08-02 NOTE — H&P
Dr. Layton Longoria - negative    UPPER GASTROINTESTINAL ENDOSCOPY N/A 7/5/2022    EGD BIOPSY performed by Jesus Bunch MD at [facility] ENDOSCOPY       Allergies:  Patient has no known allergies. Medications:   Home Meds  Current Outpatient Medications on File Prior to Encounter   Medication Sig Dispense Refill    pantoprazole (PROTONIX) 40 MG tablet Take 1 tablet by mouth daily (with breakfast) 30 tablet 3    atorvastatin (LIPITOR) 20 MG tablet TAKE ONE TABLET BY MOUTH DAILY 90 tablet 3     No current facility-administered medications on file prior to encounter. Current Meds  No current facility-administered medications for this encounter.         ASA 1 - Normal health patient    I (soft palate, uvula, fauces, tonsillar pillars visible)    Activity:  2 - Able to move 4 extremities voluntarily on command  Respiration:  2 - Able to breathe deeply and cough freely  Circulation:  2 - BP+/- 20mmHg of normal  Consciousness:  2 - Fully awake  Oxygen Saturation (color):  2 - Able to maintain oxygen saturation >92% on room air    Sedation : Moderate sedation planned    HPI / Treatment plan : CT guided biopsy of right apical lung nodule

## 2022-08-02 NOTE — PROGRESS NOTES
Ambulatory Surgery/Procedure Discharge Note    Vitals:    08/02/22 1515   BP: 138/71   Pulse: (!) 102   Resp: 20   Temp:    SpO2: 100%     HR Meets Esau Score Criteria with T 97.4  No intake/output data recorded. Restroom use offered before discharge. Yes    Pain assessment:  level of pain (1-10, 10 severe),   Pain Level: 0    CXR shows No pneumothorax. Pt alert and oriented x4. IV removed per protocol. Denies N/V or pain. Dressing to upper back C, D, & I. Voided prior to discharge. Discharge instructions given to pt and family with pt permission. Pt and family verbalized understanding of all instructions. Pt and family states \"ready to go home\". Left with all belongings and discharge instructions. Patient discharged to home/self care.  Patient discharged via wheel chair by transporter to waiting family/S.O.       8/2/2022 4:08 PM

## 2022-08-02 NOTE — PROGRESS NOTES
Ambulatory Surgery/Procedure Discharge Note    Vitals:    08/02/22 1515   BP: 138/71   Pulse: (!) 102   Resp: 20   Temp:    SpO2: 100%       No intake/output data recorded. Restroom use offered before discharge. Yes, pt void per bathroom, assist x1. Pain assessment:  level of pain (1-10, 10 severe)  Pain Level: 0        Patient discharged to home/self care. Patient discharged via wheel chair by transporter to waiting family/S.O.        1155 : Pt to SDS post CT guided lung biopsy. Pt c/o mild discomfort  when breathing and shortness of breath. Pt's SpO2 sat 97% at this time. Biopsy dressing clean, dry and intact, site non-tender upon palpation. 1223: Pt states that discomfort has \"gotten better\". No change to biopsy site. Pt resting quietly with  at bedside. Awaiting chest x-ray. 1254: Dr. Everitt Kanner in to see pt at this time. Biopsy site clean, dry and intact. Pt states that chest discomfort is \" better\". Pt's SpO2 sat 97%. Per Dr. Everitt Kanner, pt ok to eat regular diet and have PO fluids. Radiology present to do second x-ray. 1305: Pt tolerating PO fluids and pudding well. No change to biopsy site. Pt c/o shortness of breath. CT staff notified, states that they will \" notify Dr. Everitt Kanner. \"   [phone]: CT staff states Dr. Everitt Kanner aware of pt status. No new orders given. No change to biopsy site. Pt tolerating lunch well. 1407: No change to biopsy site, pt resting quietly  1530: Pt resting quietly after being escorted to the rest room. No change to biopsy site. Discharge instructions given to pt's daughter and she states understanding of these instructions. 1533: This writer called Radiology to confirm reading of chest x- ray and staff member states that it is \" being read now. \"   Report given to [facility].

## 2022-08-02 NOTE — DISCHARGE INSTRUCTIONS
Discharge Instructions for Needle Biopsy of the Lung     ACTIVITY:   Rest for the first 24-48 hours. A responsible person should be with you for 24 hours  Under most circumstances, patients are advised to wait 24-48 hours prior to driving and working after a needle biopsy of the lung. Avoid strenuous activities for one week. Avoid lifting anything 10 pounds (4.5 kilograms) or heavier for the 3-4 days after your procedure. As difficult as it may be, try not to cough during the first 24 hours after the biopsy. Blood in sputum (bright red or pink tinged) may occur and is self limiting, if persistent call you doctor    MEDICATION INSTRUCTIONS:  [x]Resume medications as listed:   [x]You may take a non-prescription headache remedy, preferably one that does not contain aspirin. [x]Give the list of your medications to your primary care physician on your next visit. Keep your med  list updated and carry it with in case of emergencies. If you had to stop blood thinning medicines before the procedure, you may resume day after procedure unless otherwise directed by your prescribing doctor. Medicines often stopped include:    Anti-inflammatory drugs (eg, aspirin )   Coumadin (warfarin) or Plavix (clopidogrel)    DIET INSTRUCTIONS:  Resume your normal diet  WOUND/DRESSING INSTRUCTIONS:      Always clean your hands before and after caring for the wound. [x]May shower today  [x]Avoid tub baths, hot tub, swimming pool etc. (do not submerge site in water) for 1 week to prevent infection     [x]Remove dressing in 1-2 days, may apply band-aid                  Call Your Doctor If Any of the Following Occurs   Monitor your recovery once you leave the hospital. As soon as you have a problem, alert your doctor.  If any of the following occur, call your doctor:    Signs of infection, including fever and chills     Redness, swelling, increasing pain, excessive bleeding, or any discharge from the incision site when you have sedation. You will beasked to sign a consent form that says you understand the risks. You will get instructions to help you prepare for your procedure. You'll be told when to stop eating and drinking. If you take medicine, you will be toldwhat you can and can't take beforehand. Do not smoke for as long as possible, but at least 1 month, before your procedure. Smoking can increase your risk of problems under sedation and candelay your recovery. If you have sleep apnea and you have a CPAP machine, be sure to use it. If your doctor told you that you need a CPAP but you don't have one yet, get one andlearn how to use it before your procedure. Arrange for a friend or a family member to drive you home afterward. Don't planto drive yourself. What are the risks? Serious problems are rare. They include breathing that slows or stops and anallergic reaction to the medicine. Some health issues may increase the risk of problems. These include:  Smoking. Sleep apnea. This happens during sleep when a blocked airway causes breathing problems. Being overweight. What can you expect after sedation? Your doctors and nurses will take care of you until the sedation has worn off enough for you to go home safely. You may feel some pain or discomfort from your procedure. If you have pain, don't be afraid to say so. Pain medicineworks better if you take it before the pain gets bad. You may feel some of the side effects of sedation for a while. They include:  Feeling tired or sleepy. Feeling dizzy or unsteady. If you've had sedation, wait 24 hours before you drive or operate machinery. Don't make important decisions, go to work or school, or sign legal documents until you are recovered. It takes time for the medicine's effects to completelywear off.

## 2022-08-03 ENCOUNTER — TELEPHONE (OUTPATIENT)
Dept: FAMILY MEDICINE CLINIC | Age: 77
End: 2022-08-03

## 2022-08-03 NOTE — TELEPHONE ENCOUNTER
Pt was diagnosed with multiple myeloma  Oncologist was prescribing Abranpar  Daughter would like to discuss putting her back on some anti anxiety  She would like be contacted before making an appt

## 2022-08-03 NOTE — TELEPHONE ENCOUNTER
Called and talked to daughter Marcella Castillo. Appt scheduled for tomorrow with Mallory to discuss anxiety medication.

## 2022-08-04 ENCOUNTER — OFFICE VISIT (OUTPATIENT)
Dept: FAMILY MEDICINE CLINIC | Age: 77
End: 2022-08-04
Payer: MEDICARE

## 2022-08-04 VITALS
SYSTOLIC BLOOD PRESSURE: 124 MMHG | HEIGHT: 58 IN | OXYGEN SATURATION: 99 % | BODY MASS INDEX: 26.66 KG/M2 | WEIGHT: 127 LBS | HEART RATE: 110 BPM | DIASTOLIC BLOOD PRESSURE: 78 MMHG | TEMPERATURE: 97.3 F

## 2022-08-04 DIAGNOSIS — C90.00 MULTIPLE MYELOMA NOT HAVING ACHIEVED REMISSION (HCC): ICD-10-CM

## 2022-08-04 DIAGNOSIS — N18.4 ACUTE RENAL FAILURE SUPERIMPOSED ON STAGE 4 CHRONIC KIDNEY DISEASE, UNSPECIFIED ACUTE RENAL FAILURE TYPE (HCC): ICD-10-CM

## 2022-08-04 DIAGNOSIS — F41.9 ANXIETY: ICD-10-CM

## 2022-08-04 DIAGNOSIS — N17.9 ACUTE RENAL FAILURE SUPERIMPOSED ON STAGE 4 CHRONIC KIDNEY DISEASE, UNSPECIFIED ACUTE RENAL FAILURE TYPE (HCC): ICD-10-CM

## 2022-08-04 DIAGNOSIS — N17.9 AKI (ACUTE KIDNEY INJURY) (HCC): ICD-10-CM

## 2022-08-04 LAB
ALBUMIN SERPL-MCNC: 3.8 G/DL (ref 3.4–5)
ANION GAP SERPL CALCULATED.3IONS-SCNC: 16 MMOL/L (ref 3–16)
BASOPHILS ABSOLUTE: 0 K/UL (ref 0–0.2)
BASOPHILS RELATIVE PERCENT: 0.2 %
BUN BLDV-MCNC: 71 MG/DL (ref 7–20)
CALCIUM SERPL-MCNC: 8.7 MG/DL (ref 8.3–10.6)
CHLORIDE BLD-SCNC: 107 MMOL/L (ref 99–110)
CO2: 20 MMOL/L (ref 21–32)
CREAT SERPL-MCNC: 3.8 MG/DL (ref 0.6–1.2)
CREATININE URINE: 38 MG/DL (ref 28–259)
EOSINOPHILS ABSOLUTE: 0 K/UL (ref 0–0.6)
EOSINOPHILS RELATIVE PERCENT: 0.4 %
GFR AFRICAN AMERICAN: 14
GFR NON-AFRICAN AMERICAN: 12
GLUCOSE BLD-MCNC: 162 MG/DL (ref 70–99)
HCT VFR BLD CALC: 21.5 % (ref 36–48)
HEMOGLOBIN: 7.2 G/DL (ref 12–16)
LYMPHOCYTES ABSOLUTE: 0.8 K/UL (ref 1–5.1)
LYMPHOCYTES RELATIVE PERCENT: 17.9 %
MCH RBC QN AUTO: 28.6 PG (ref 26–34)
MCHC RBC AUTO-ENTMCNC: 33.5 G/DL (ref 31–36)
MCV RBC AUTO: 85.4 FL (ref 80–100)
MONOCYTES ABSOLUTE: 0.8 K/UL (ref 0–1.3)
MONOCYTES RELATIVE PERCENT: 19.8 %
NEUTROPHILS ABSOLUTE: 2.6 K/UL (ref 1.7–7.7)
NEUTROPHILS RELATIVE PERCENT: 61.7 %
PDW BLD-RTO: 15.8 % (ref 12.4–15.4)
PHOSPHORUS: 4.3 MG/DL (ref 2.5–4.9)
PLATELET # BLD: 188 K/UL (ref 135–450)
PMV BLD AUTO: 7.8 FL (ref 5–10.5)
POTASSIUM SERPL-SCNC: 5 MMOL/L (ref 3.5–5.1)
PROTEIN PROTEIN: 5 MG/DL
PROTEIN/CREAT RATIO: 0.1 MG/DL
RBC # BLD: 2.51 M/UL (ref 4–5.2)
SODIUM BLD-SCNC: 143 MMOL/L (ref 136–145)
WBC # BLD: 4.3 K/UL (ref 4–11)

## 2022-08-04 PROCEDURE — 99214 OFFICE O/P EST MOD 30 MIN: CPT | Performed by: NURSE PRACTITIONER

## 2022-08-04 PROCEDURE — 1123F ACP DISCUSS/DSCN MKR DOCD: CPT | Performed by: NURSE PRACTITIONER

## 2022-08-04 RX ORDER — ASPIRIN 81 MG/1
81 TABLET ORAL DAILY
COMMUNITY

## 2022-08-04 RX ORDER — DEXAMETHASONE 4 MG/1
4 TABLET ORAL 2 TIMES DAILY WITH MEALS
COMMUNITY

## 2022-08-04 RX ORDER — ACYCLOVIR 400 MG/1
400 TABLET ORAL 2 TIMES DAILY
COMMUNITY

## 2022-08-04 RX ORDER — SODIUM BICARBONATE 325 MG/1
10 TABLET ORAL 2 TIMES DAILY
COMMUNITY
End: 2022-09-01 | Stop reason: SDUPTHER

## 2022-08-04 RX ORDER — MONTELUKAST SODIUM 10 MG/1
10 TABLET ORAL NIGHTLY
COMMUNITY

## 2022-08-04 RX ORDER — LORAZEPAM 0.5 MG/1
0.5 TABLET ORAL EVERY 6 HOURS PRN
COMMUNITY
End: 2022-10-06

## 2022-08-04 ASSESSMENT — ENCOUNTER SYMPTOMS
EYE ITCHING: 0
BLOOD IN STOOL: 0
RHINORRHEA: 0
VOMITING: 0
ABDOMINAL PAIN: 0
EYE REDNESS: 0
SHORTNESS OF BREATH: 0
SORE THROAT: 0
CHEST TIGHTNESS: 0
WHEEZING: 0
SINUS PRESSURE: 0
NAUSEA: 0
BACK PAIN: 0
COUGH: 0
DIARRHEA: 0
CONSTIPATION: 0
COLOR CHANGE: 0

## 2022-08-04 NOTE — PROGRESS NOTES
Roma Ba (:  1945) is a 68 y.o. female,Established patient, here for evaluation of the following chief complaint(s): Anxiety      ASSESSMENT/PLAN:  1. Anxiety  Assessment & Plan:   Patient with acute anxiety in office today. We will continue her lorazepam at this time we will start daily maintenance medication controlled anxiety. It is suspected that her side effects that she was experiencing when she started the BuSpar was actually related to her renal function and not the medication. So we will restart her BuSpar 5 mg twice a day as she is exhibiting anxious symptoms and has this medication at home. We will have her follow-up in 2 weeks with symptoms to see how she is tolerating the medication. Patient and daughter in the office agreeable to the plan at this time  2. Acute renal failure superimposed on stage 4 chronic kidney disease, unspecified acute renal failure type [facility])  Assessment & Plan:   Monitored by specialist- no acute findings meriting change in the plan  3. Multiple myeloma not having achieved remission [facility])  Assessment & Plan:   Monitored by specialist- no acute findings meriting change in the plan    No follow-ups on file. SUBJECTIVE/OBJECTIVE:  Patient the office today with anxiety. Recently diagnosed with multiple myeloma and was recently admitted to the hospital with acute kidney injury. Her oncologist put her on BuSpar and she took it for 2 days and then started to have severe tremors but the time was in renal failure. They were concerned about side effects and causes so they stopped the BuSpar and had her continue lorazepam.  Her anxiety has been getting worse and she is having a hard time sleeping. She is taking lorazepam twice a day at this time but is looking for something to be more stable to help control her anxiety overall. She is accompanied with her daughter for today's appointment.   She will be seeing her nephrologist today and her renal function has recovered. Current Outpatient Medications   Medication Sig Dispense Refill    montelukast (SINGULAIR) 10 MG tablet Take 10 mg by mouth nightly      dexamethasone (DECADRON) 4 MG tablet Take 4 mg by mouth in the morning and 4 mg in the evening. Take with meals. aspirin 81 MG EC tablet Take 81 mg by mouth in the morning. LORazepam (ATIVAN) 0.5 MG tablet Take 0.5 mg by mouth every 6 hours as needed for Anxiety. ALBUTEROL SULFATE IN Inhale into the lungs      acyclovir (ZOVIRAX) 400 MG tablet Take 400 mg by mouth every 4 hours (while awake)      sodium bicarbonate 325 MG tablet Take 10 mg by mouth in the morning and 10 mg before bedtime. 2 tabs twice a day. pantoprazole (PROTONIX) 40 MG tablet Take 1 tablet by mouth daily (with breakfast) 30 tablet 3    atorvastatin (LIPITOR) 20 MG tablet TAKE ONE TABLET BY MOUTH DAILY 90 tablet 3     No current facility-administered medications for this visit. Review of Systems   Constitutional:  Negative for chills, fatigue and fever. HENT:  Negative for congestion, ear pain, postnasal drip, rhinorrhea, sinus pressure, sneezing and sore throat. Eyes:  Negative for redness and itching. Respiratory:  Negative for cough, chest tightness, shortness of breath and wheezing. Cardiovascular:  Negative for chest pain and palpitations. Gastrointestinal:  Negative for abdominal pain, blood in stool, constipation, diarrhea, nausea and vomiting. Endocrine: Negative for cold intolerance and heat intolerance. Genitourinary:  Negative for difficulty urinating, dysuria, flank pain, frequency, hematuria and urgency. Musculoskeletal:  Negative for arthralgias, back pain, joint swelling and myalgias. Skin:  Negative for color change, pallor, rash and wound. Allergic/Immunologic: Negative for environmental allergies and food allergies. Neurological:  Negative for dizziness, seizures, syncope, weakness, light-headedness, numbness and headaches. Hematological:  Negative for adenopathy. Does not bruise/bleed easily. Psychiatric/Behavioral:  Negative for confusion, sleep disturbance and suicidal ideas. The patient is nervous/anxious. The patient is not hyperactive. Vitals:    08/04/22 1013   BP: 124/78   Site: Right Upper Arm   Position: Sitting   Cuff Size: Medium Adult   Pulse: (!) 110   Temp: 97.3 °F (36.3 °C)   SpO2: 99%   Weight: 127 lb (57.6 kg)   Height: 4' 10\" (1.473 m)       Physical Exam  Constitutional:       Appearance: Normal appearance. She is well-developed. HENT:      Head: Normocephalic and atraumatic. Right Ear: Hearing normal.      Left Ear: Hearing normal.      Nose: No mucosal edema. Right Sinus: No maxillary sinus tenderness or frontal sinus tenderness. Left Sinus: No maxillary sinus tenderness or frontal sinus tenderness. Mouth/Throat: Tonsils: No tonsillar abscesses. Eyes:      Extraocular Movements: Extraocular movements intact. Pupils: Pupils are equal, round, and reactive to light. Cardiovascular:      Rate and Rhythm: Normal rate and regular rhythm. Pulses: Normal pulses. Heart sounds: Normal heart sounds. Pulmonary:      Effort: Pulmonary effort is normal.      Breath sounds: Normal breath sounds. Lymphadenopathy:      Head:      Right side of head: No submental, submandibular, tonsillar, preauricular, posterior auricular or occipital adenopathy. Left side of head: No submental, submandibular, tonsillar, preauricular, posterior auricular or occipital adenopathy. Skin:     General: Skin is warm and dry. Neurological:      Mental Status: She is alert. Psychiatric:         Mood and Affect: Mood normal.         Behavior: Behavior normal.               An electronic signature was used to authenticate this note.     --Jerel Kussmaul, APRN - CNP

## 2022-08-04 NOTE — ASSESSMENT & PLAN NOTE
Patient with acute anxiety in office today. We will continue her lorazepam at this time we will start daily maintenance medication controlled anxiety. It is suspected that her side effects that she was experiencing when she started the BuSpar was actually related to her renal function and not the medication. So we will restart her BuSpar 5 mg twice a day as she is exhibiting anxious symptoms and has this medication at home. We will have her follow-up in 2 weeks with symptoms to see how she is tolerating the medication.   Patient and daughter in the office agreeable to the plan at this time

## 2022-08-06 ENCOUNTER — HOSPITAL ENCOUNTER (OUTPATIENT)
Dept: ONCOLOGY | Age: 77
Setting detail: INFUSION SERIES
Discharge: HOME | End: 2022-08-06
Payer: MEDICARE

## 2022-08-06 ENCOUNTER — HOSPITAL ENCOUNTER (OUTPATIENT)
Age: 77
Discharge: HOME | End: 2022-08-06
Attending: STUDENT IN AN ORGANIZED HEALTH CARE EDUCATION/TRAINING PROGRAM | Admitting: STUDENT IN AN ORGANIZED HEALTH CARE EDUCATION/TRAINING PROGRAM
Payer: MEDICARE

## 2022-08-06 LAB
ABO/RH: NORMAL
ABO/RH: NORMAL
ANTIBODY IDENTIFICATION: NORMAL
ANTIBODY IDENTIFICATION: NORMAL
ANTIBODY SCREEN: NORMAL
ANTIBODY SCREEN: NORMAL

## 2022-08-06 PROCEDURE — P9040 RBC LEUKOREDUCED IRRADIATED: HCPCS

## 2022-08-06 PROCEDURE — 86922 COMPATIBILITY TEST ANTIGLOB: CPT

## 2022-08-06 PROCEDURE — 86900 BLOOD TYPING SEROLOGIC ABO: CPT

## 2022-08-06 PROCEDURE — 86901 BLOOD TYPING SEROLOGIC RH(D): CPT

## 2022-08-06 PROCEDURE — 86850 RBC ANTIBODY SCREEN: CPT

## 2022-08-06 PROCEDURE — 86870 RBC ANTIBODY IDENTIFICATION: CPT

## 2022-08-06 RX ORDER — DIPHENHYDRAMINE HYDROCHLORIDE 50 MG/ML
50 INJECTION INTRAMUSCULAR; INTRAVENOUS
OUTPATIENT
Start: 2022-08-07

## 2022-08-06 RX ORDER — SODIUM CHLORIDE 9 MG/ML
INJECTION, SOLUTION INTRAVENOUS CONTINUOUS
OUTPATIENT
Start: 2022-08-06

## 2022-08-06 RX ORDER — ALBUTEROL SULFATE 90 UG/1
4 AEROSOL, METERED RESPIRATORY (INHALATION) PRN
OUTPATIENT
Start: 2022-08-07

## 2022-08-06 RX ORDER — ALBUTEROL SULFATE 90 UG/1
4 AEROSOL, METERED RESPIRATORY (INHALATION) PRN
OUTPATIENT
Start: 2022-08-06

## 2022-08-06 RX ORDER — SODIUM CHLORIDE 0.9 % (FLUSH) 0.9 %
5-40 SYRINGE (ML) INJECTION PRN
Status: CANCELLED | OUTPATIENT
Start: 2022-08-06

## 2022-08-06 RX ORDER — SODIUM CHLORIDE 9 MG/ML
INJECTION, SOLUTION INTRAVENOUS CONTINUOUS
OUTPATIENT
Start: 2022-08-07

## 2022-08-06 RX ORDER — DIPHENHYDRAMINE HYDROCHLORIDE 50 MG/ML
50 INJECTION INTRAMUSCULAR; INTRAVENOUS
OUTPATIENT
Start: 2022-08-06

## 2022-08-06 RX ORDER — SODIUM CHLORIDE 9 MG/ML
25 INJECTION, SOLUTION INTRAVENOUS PRN
Status: CANCELLED | OUTPATIENT
Start: 2022-08-06

## 2022-08-06 RX ORDER — SODIUM CHLORIDE 9 MG/ML
20 INJECTION, SOLUTION INTRAVENOUS CONTINUOUS
Status: CANCELLED | OUTPATIENT
Start: 2022-08-06

## 2022-08-06 RX ORDER — ACETAMINOPHEN 325 MG/1
650 TABLET ORAL
OUTPATIENT
Start: 2022-08-06

## 2022-08-06 RX ORDER — ONDANSETRON 2 MG/ML
8 INJECTION INTRAMUSCULAR; INTRAVENOUS
OUTPATIENT
Start: 2022-08-06

## 2022-08-06 RX ORDER — ACETAMINOPHEN 325 MG/1
650 TABLET ORAL
OUTPATIENT
Start: 2022-08-07

## 2022-08-06 RX ORDER — ONDANSETRON 2 MG/ML
8 INJECTION INTRAMUSCULAR; INTRAVENOUS
OUTPATIENT
Start: 2022-08-07

## 2022-08-06 NOTE — PROGRESS NOTES
Pt type & screen collected for blood transfusion 8/7/2022.  Electronically signed by Brian Mcgovern RN on 8/6/2022 at 12:27 PM

## 2022-08-07 ENCOUNTER — HOSPITAL ENCOUNTER (OUTPATIENT)
Dept: ONCOLOGY | Age: 77
Setting detail: INFUSION SERIES
Discharge: HOME | End: 2022-08-07
Payer: MEDICARE

## 2022-08-07 VITALS
HEART RATE: 99 BPM | DIASTOLIC BLOOD PRESSURE: 81 MMHG | TEMPERATURE: 98.3 F | OXYGEN SATURATION: 97 % | RESPIRATION RATE: 18 BRPM | SYSTOLIC BLOOD PRESSURE: 143 MMHG

## 2022-08-07 DIAGNOSIS — D50.0 IRON DEFICIENCY ANEMIA DUE TO CHRONIC BLOOD LOSS: Primary | ICD-10-CM

## 2022-08-07 LAB
BLOOD BANK DISPENSE STATUS: NORMAL
BLOOD BANK PRODUCT CODE: NORMAL
BPU ID: NORMAL
DESCRIPTION BLOOD BANK: NORMAL

## 2022-08-07 PROCEDURE — 36430 TRANSFUSION BLD/BLD COMPNT: CPT

## 2022-08-07 PROCEDURE — P9040 RBC LEUKOREDUCED IRRADIATED: HCPCS

## 2022-08-07 RX ORDER — SODIUM CHLORIDE 0.9 % (FLUSH) 0.9 %
5-40 SYRINGE (ML) INJECTION PRN
Status: DISCONTINUED | OUTPATIENT
Start: 2022-08-07 | End: 2022-08-08 | Stop reason: HOSPADM

## 2022-08-07 RX ORDER — SODIUM CHLORIDE 9 MG/ML
20 INJECTION, SOLUTION INTRAVENOUS CONTINUOUS
Status: DISCONTINUED | OUTPATIENT
Start: 2022-08-07 | End: 2022-08-08 | Stop reason: HOSPADM

## 2022-08-07 RX ORDER — SODIUM CHLORIDE 9 MG/ML
25 INJECTION, SOLUTION INTRAVENOUS PRN
Status: DISCONTINUED | OUTPATIENT
Start: 2022-08-07 | End: 2022-08-08 | Stop reason: HOSPADM

## 2022-08-07 NOTE — PROGRESS NOTES
Pt's HGB 8/6/2022: 6.7  Pt seen at [facility] today for  Packed red blood cell transfusion  for above lab values. Informed consent verified. No Pre-medications ordered with no previous transfusion reaction history. Transfused per policy. Pt tolerated transfusion well and without incident. Pt verbalizes understanding of discharge instructions. Discharged to home with .   Vale Alvarez RN

## 2022-08-17 ENCOUNTER — OFFICE VISIT (OUTPATIENT)
Dept: FAMILY MEDICINE CLINIC | Age: 77
End: 2022-08-17
Payer: MEDICARE

## 2022-08-17 VITALS
HEIGHT: 58 IN | DIASTOLIC BLOOD PRESSURE: 72 MMHG | TEMPERATURE: 97.1 F | BODY MASS INDEX: 26.45 KG/M2 | HEART RATE: 104 BPM | OXYGEN SATURATION: 97 % | WEIGHT: 126 LBS | SYSTOLIC BLOOD PRESSURE: 120 MMHG

## 2022-08-17 DIAGNOSIS — F41.9 ANXIETY: ICD-10-CM

## 2022-08-17 DIAGNOSIS — A04.8 BACTERIAL INFECTION DUE TO H. PYLORI: Primary | ICD-10-CM

## 2022-08-17 PROCEDURE — 1123F ACP DISCUSS/DSCN MKR DOCD: CPT | Performed by: NURSE PRACTITIONER

## 2022-08-17 PROCEDURE — 99214 OFFICE O/P EST MOD 30 MIN: CPT | Performed by: NURSE PRACTITIONER

## 2022-08-17 RX ORDER — BUSPIRONE HYDROCHLORIDE 10 MG/1
10 TABLET ORAL 3 TIMES DAILY
COMMUNITY

## 2022-08-17 ASSESSMENT — ENCOUNTER SYMPTOMS
BACK PAIN: 0
SORE THROAT: 0
CHEST TIGHTNESS: 0
BLOOD IN STOOL: 0
SHORTNESS OF BREATH: 0
NAUSEA: 0
DIARRHEA: 0
WHEEZING: 0
CONSTIPATION: 0
VOMITING: 0
COUGH: 0
RHINORRHEA: 0
ABDOMINAL PAIN: 0
COLOR CHANGE: 0
EYE REDNESS: 0
SINUS PRESSURE: 0
EYE ITCHING: 0

## 2022-08-17 NOTE — ASSESSMENT & PLAN NOTE
Doing well since starting the BuSpar. Will provide a referral to Dr. Mary Brito and she will get appointment scheduled for an in person visit and that this will be beneficial for her.   We will continue with lorazepam as needed use

## 2022-08-17 NOTE — PROGRESS NOTES
Nikolay Stephens (:  1945) is a 68 y.o. female,Established patient, here for evaluation of the following chief complaint(s): Other (2 weeks)      ASSESSMENT/PLAN:  1. Bacterial infection due to H. pylori  Assessment & Plan:   Stool test for eradication stop Protonix 2 weeks before test  Orders:  -     H. PYLORI ANTIGEN, STOOL; Future  2. Anxiety  Assessment & Plan:   Doing well since starting the BuSpar. Will provide a referral to Dr. Sekou Marshall and she will get appointment scheduled for an in person visit and that this will be beneficial for her. We will continue with lorazepam as needed use      No follow-ups on file. SUBJECTIVE/OBJECTIVE:    Patient the office for 2-week follow-up for her anxiety. She was started on BuSpar and since she restarted this medication she has noticed improvement over the past 48 hours and her mood overall. She was dealing with some GI disturbances but these have settled down. She was recently treated for H. pylori but never had a retest for eradication. She would like to get this completed she is still taking Protonix at this time however. She is continue to follow with oncology and is feeling better about things. She is interested however in seeing psychology to help deal with her emotions. She states that the nighttime is when her anxiety is the worst.  She continues to use lorazepam sparingly as needed primarily at night to help her sleep  Current Outpatient Medications   Medication Sig Dispense Refill    busPIRone (BUSPAR) 10 MG tablet Take 10 mg by mouth 3 times daily      montelukast (SINGULAIR) 10 MG tablet Take 10 mg by mouth nightly      dexamethasone (DECADRON) 4 MG tablet Take 4 mg by mouth in the morning and 4 mg in the evening. Take with meals. aspirin 81 MG EC tablet Take 81 mg by mouth in the morning. LORazepam (ATIVAN) 0.5 MG tablet Take 0.5 mg by mouth every 6 hours as needed for Anxiety.       ALBUTEROL SULFATE IN Inhale into the lungs acyclovir (ZOVIRAX) 400 MG tablet Take 400 mg by mouth in the morning and 400 mg before bedtime. sodium bicarbonate 325 MG tablet Take 10 mg by mouth in the morning and 10 mg before bedtime. 2 tabs twice a day. pantoprazole (PROTONIX) 40 MG tablet Take 1 tablet by mouth daily (with breakfast) 30 tablet 3    atorvastatin (LIPITOR) 20 MG tablet TAKE ONE TABLET BY MOUTH DAILY 90 tablet 3     No current facility-administered medications for this visit. Review of Systems   Constitutional:  Negative for chills, fatigue and fever. HENT:  Negative for congestion, ear pain, postnasal drip, rhinorrhea, sinus pressure, sneezing and sore throat. Eyes:  Negative for redness and itching. Respiratory:  Negative for cough, chest tightness, shortness of breath and wheezing. Cardiovascular:  Negative for chest pain and palpitations. Gastrointestinal:  Negative for abdominal pain, blood in stool, constipation, diarrhea, nausea and vomiting. Endocrine: Negative for cold intolerance and heat intolerance. Genitourinary:  Negative for difficulty urinating, dysuria, flank pain, frequency, hematuria and urgency. Musculoskeletal:  Negative for arthralgias, back pain, joint swelling and myalgias. Skin:  Negative for color change, pallor, rash and wound. Allergic/Immunologic: Negative for environmental allergies and food allergies. Neurological:  Negative for dizziness, seizures, syncope, weakness, light-headedness, numbness and headaches. Hematological:  Negative for adenopathy. Does not bruise/bleed easily. Psychiatric/Behavioral:  Negative for confusion, sleep disturbance and suicidal ideas. The patient is not nervous/anxious and is not hyperactive.       Vitals:    08/17/22 1036   BP: 120/72   Site: Left Upper Arm   Position: Sitting   Cuff Size: Medium Adult   Pulse: (!) 104   Temp: 97.1 °F (36.2 °C)   SpO2: 97%   Weight: 126 lb (57.2 kg)   Height: 4' 10\" (1.473 m)       Physical Exam  Constitutional:       Appearance: Normal appearance. She is well-developed. HENT:      Head: Normocephalic and atraumatic. Right Ear: Hearing normal.      Left Ear: Hearing normal.      Nose: No mucosal edema. Right Sinus: No maxillary sinus tenderness or frontal sinus tenderness. Left Sinus: No maxillary sinus tenderness or frontal sinus tenderness. Mouth/Throat: Tonsils: No tonsillar abscesses. Eyes:      Extraocular Movements: Extraocular movements intact. Pupils: Pupils are equal, round, and reactive to light. Cardiovascular:      Rate and Rhythm: Normal rate and regular rhythm. Pulses: Normal pulses. Heart sounds: Normal heart sounds. Pulmonary:      Effort: Pulmonary effort is normal.      Breath sounds: Normal breath sounds. Lymphadenopathy:      Head:      Right side of head: No submental, submandibular, tonsillar, preauricular, posterior auricular or occipital adenopathy. Left side of head: No submental, submandibular, tonsillar, preauricular, posterior auricular or occipital adenopathy. Skin:     General: Skin is warm and dry. Neurological:      Mental Status: She is alert. Psychiatric:         Mood and Affect: Mood normal.         Behavior: Behavior normal.               An electronic signature was used to authenticate this note.     --Grace Brown, APRARTURO - CNP

## 2022-08-30 DIAGNOSIS — N17.9 AKI (ACUTE KIDNEY INJURY) (HCC): ICD-10-CM

## 2022-08-31 LAB
ALBUMIN SERPL-MCNC: 3.7 G/DL (ref 3.4–5)
ANION GAP SERPL CALCULATED.3IONS-SCNC: 14 MMOL/L (ref 3–16)
BASOPHILS ABSOLUTE: 0 K/UL (ref 0–0.2)
BASOPHILS RELATIVE PERCENT: 0.1 %
BUN BLDV-MCNC: 43 MG/DL (ref 7–20)
CALCIUM SERPL-MCNC: 9.3 MG/DL (ref 8.3–10.6)
CHLORIDE BLD-SCNC: 97 MMOL/L (ref 99–110)
CO2: 21 MMOL/L (ref 21–32)
CREAT SERPL-MCNC: 2.6 MG/DL (ref 0.6–1.2)
EOSINOPHILS ABSOLUTE: 0 K/UL (ref 0–0.6)
EOSINOPHILS RELATIVE PERCENT: 0.3 %
GFR AFRICAN AMERICAN: 22
GFR NON-AFRICAN AMERICAN: 18
GLUCOSE BLD-MCNC: 115 MG/DL (ref 70–99)
HCT VFR BLD CALC: 26.8 % (ref 36–48)
HEMOGLOBIN: 8.8 G/DL (ref 12–16)
LYMPHOCYTES ABSOLUTE: 0.8 K/UL (ref 1–5.1)
LYMPHOCYTES RELATIVE PERCENT: 5.9 %
MCH RBC QN AUTO: 28.8 PG (ref 26–34)
MCHC RBC AUTO-ENTMCNC: 32.8 G/DL (ref 31–36)
MCV RBC AUTO: 87.7 FL (ref 80–100)
MONOCYTES ABSOLUTE: 0.6 K/UL (ref 0–1.3)
MONOCYTES RELATIVE PERCENT: 4.7 %
NEUTROPHILS ABSOLUTE: 11.6 K/UL (ref 1.7–7.7)
NEUTROPHILS RELATIVE PERCENT: 89 %
PDW BLD-RTO: 17.4 % (ref 12.4–15.4)
PHOSPHORUS: 2.7 MG/DL (ref 2.5–4.9)
PLATELET # BLD: 156 K/UL (ref 135–450)
PMV BLD AUTO: 8.9 FL (ref 5–10.5)
POTASSIUM SERPL-SCNC: 4.6 MMOL/L (ref 3.5–5.1)
RBC # BLD: 3.06 M/UL (ref 4–5.2)
SODIUM BLD-SCNC: 132 MMOL/L (ref 136–145)
WBC # BLD: 13.1 K/UL (ref 4–11)

## 2022-09-02 DIAGNOSIS — A04.8 BACTERIAL INFECTION DUE TO H. PYLORI: ICD-10-CM

## 2022-09-06 LAB — H PYLORI ANTIGEN STOOL: NEGATIVE

## 2022-10-05 DIAGNOSIS — F41.9 ANXIETY: Primary | ICD-10-CM

## 2022-10-06 RX ORDER — LORAZEPAM 0.5 MG/1
TABLET ORAL
Qty: 60 TABLET | Refills: 4 | Status: SHIPPED | OUTPATIENT
Start: 2022-10-06 | End: 2022-11-05

## 2022-10-31 RX ORDER — PANTOPRAZOLE SODIUM 40 MG/1
TABLET, DELAYED RELEASE ORAL
Qty: 30 TABLET | Refills: 11 | Status: SHIPPED | OUTPATIENT
Start: 2022-10-31

## 2022-11-01 PROBLEM — C34.91 PRIMARY ADENOCARCINOMA OF RIGHT LUNG (HCC): Status: ACTIVE | Noted: 2022-11-01

## 2022-12-06 ENCOUNTER — HOSPITAL ENCOUNTER (OUTPATIENT)
Dept: CT IMAGING | Age: 77
Discharge: HOME | End: 2022-12-06
Payer: MEDICARE

## 2022-12-06 DIAGNOSIS — C90.00 MYELOMA ASSOCIATED AMYLOIDOSIS (HCC): ICD-10-CM

## 2022-12-06 DIAGNOSIS — E85.9 MYELOMA ASSOCIATED AMYLOIDOSIS (HCC): ICD-10-CM

## 2022-12-06 PROCEDURE — 71250 CT THORAX DX C-: CPT

## 2023-01-25 RX ORDER — ATORVASTATIN CALCIUM 20 MG/1
TABLET, FILM COATED ORAL
Qty: 90 TABLET | Refills: 3 | Status: SHIPPED | OUTPATIENT
Start: 2023-01-25

## 2023-02-01 RX ORDER — BUSPIRONE HYDROCHLORIDE 15 MG/1
TABLET ORAL
Qty: 60 TABLET | Refills: 0 | Status: SHIPPED | OUTPATIENT
Start: 2023-02-01

## 2023-02-28 ENCOUNTER — TRANSCRIBE ORDERS (OUTPATIENT)
Dept: ADMINISTRATIVE | Age: 78
End: 2023-02-28

## 2023-02-28 DIAGNOSIS — C34.11 MALIGNANT NEOPLASM OF UPPER LOBE OF RIGHT LUNG (HCC): Primary | ICD-10-CM

## 2023-02-28 RX ORDER — BUSPIRONE HYDROCHLORIDE 15 MG/1
TABLET ORAL
Qty: 60 TABLET | Refills: 11 | Status: SHIPPED | OUTPATIENT
Start: 2023-02-28

## 2023-02-28 RX ORDER — BUSPIRONE HYDROCHLORIDE 15 MG/1
TABLET ORAL
Qty: 60 TABLET | Refills: 11 | OUTPATIENT
Start: 2023-02-28

## 2023-03-17 ENCOUNTER — HOSPITAL ENCOUNTER (OUTPATIENT)
Dept: CT IMAGING | Age: 78
Discharge: HOME | End: 2023-03-17
Payer: MEDICARE

## 2023-03-17 DIAGNOSIS — C34.11 MALIGNANT NEOPLASM OF UPPER LOBE OF RIGHT LUNG (HCC): ICD-10-CM

## 2023-03-17 PROCEDURE — 71250 CT THORAX DX C-: CPT

## 2023-06-19 ENCOUNTER — OFFICE VISIT (OUTPATIENT)
Dept: FAMILY MEDICINE CLINIC | Age: 78
End: 2023-06-19
Payer: MEDICARE

## 2023-06-19 VITALS
WEIGHT: 132.2 LBS | DIASTOLIC BLOOD PRESSURE: 68 MMHG | SYSTOLIC BLOOD PRESSURE: 130 MMHG | BODY MASS INDEX: 27.63 KG/M2 | RESPIRATION RATE: 17 BRPM | TEMPERATURE: 97.1 F | OXYGEN SATURATION: 97 % | HEART RATE: 110 BPM

## 2023-06-19 DIAGNOSIS — Z00.00 MEDICARE ANNUAL WELLNESS VISIT, SUBSEQUENT: ICD-10-CM

## 2023-06-19 DIAGNOSIS — F41.9 ANXIETY: ICD-10-CM

## 2023-06-19 DIAGNOSIS — N17.9 ACUTE RENAL FAILURE SUPERIMPOSED ON STAGE 4 CHRONIC KIDNEY DISEASE, UNSPECIFIED ACUTE RENAL FAILURE TYPE (HCC): ICD-10-CM

## 2023-06-19 DIAGNOSIS — C34.91 PRIMARY ADENOCARCINOMA OF RIGHT LUNG (HCC): ICD-10-CM

## 2023-06-19 DIAGNOSIS — C90.00 MULTIPLE MYELOMA, REMISSION STATUS UNSPECIFIED (HCC): ICD-10-CM

## 2023-06-19 DIAGNOSIS — I77.71 DISSECTION OF CAROTID ARTERY (HCC): Primary | ICD-10-CM

## 2023-06-19 DIAGNOSIS — N18.4 ACUTE RENAL FAILURE SUPERIMPOSED ON STAGE 4 CHRONIC KIDNEY DISEASE, UNSPECIFIED ACUTE RENAL FAILURE TYPE (HCC): ICD-10-CM

## 2023-06-19 PROCEDURE — G0439 PPPS, SUBSEQ VISIT: HCPCS | Performed by: NURSE PRACTITIONER

## 2023-06-19 PROCEDURE — 1123F ACP DISCUSS/DSCN MKR DOCD: CPT | Performed by: NURSE PRACTITIONER

## 2023-06-19 RX ORDER — LORAZEPAM 0.5 MG/1
TABLET ORAL
Qty: 60 TABLET | Refills: 4 | OUTPATIENT
Start: 2023-06-19

## 2023-06-19 RX ORDER — LORAZEPAM 0.5 MG/1
TABLET ORAL
Qty: 60 TABLET | Refills: 4 | Status: SHIPPED | OUTPATIENT
Start: 2023-06-19 | End: 2023-07-19

## 2023-06-19 SDOH — ECONOMIC STABILITY: HOUSING INSECURITY
IN THE LAST 12 MONTHS, WAS THERE A TIME WHEN YOU DID NOT HAVE A STEADY PLACE TO SLEEP OR SLEPT IN A SHELTER (INCLUDING NOW)?: NO

## 2023-06-19 SDOH — ECONOMIC STABILITY: FOOD INSECURITY: WITHIN THE PAST 12 MONTHS, THE FOOD YOU BOUGHT JUST DIDN'T LAST AND YOU DIDN'T HAVE MONEY TO GET MORE.: NEVER TRUE

## 2023-06-19 SDOH — ECONOMIC STABILITY: FOOD INSECURITY: WITHIN THE PAST 12 MONTHS, YOU WORRIED THAT YOUR FOOD WOULD RUN OUT BEFORE YOU GOT MONEY TO BUY MORE.: NEVER TRUE

## 2023-06-19 SDOH — ECONOMIC STABILITY: INCOME INSECURITY: HOW HARD IS IT FOR YOU TO PAY FOR THE VERY BASICS LIKE FOOD, HOUSING, MEDICAL CARE, AND HEATING?: NOT HARD AT ALL

## 2023-06-19 ASSESSMENT — PATIENT HEALTH QUESTIONNAIRE - PHQ9
SUM OF ALL RESPONSES TO PHQ QUESTIONS 1-9: 0
2. FEELING DOWN, DEPRESSED OR HOPELESS: 0
1. LITTLE INTEREST OR PLEASURE IN DOING THINGS: 0
SUM OF ALL RESPONSES TO PHQ9 QUESTIONS 1 & 2: 0
SUM OF ALL RESPONSES TO PHQ QUESTIONS 1-9: 0

## 2023-06-19 NOTE — PATIENT INSTRUCTIONS
dental visit is recommended every 6 months. Try to get at least 150 minutes of exercise per week or 10,000 steps per day on a pedometer . Order or download the FREE \"Exercise & Physical Activity: Your Everyday Guide\" from [employer]. Call 9-525.494.4662 or search [employer] online. You need 3639-3759 mg of calcium and 7485-5206 IU of vitamin D per day. It is possible to meet your calcium requirement with diet alone, but a vitamin D supplement is usually necessary to meet this goal.  When exposed to the sun, use a sunscreen that protects against both UVA and UVB radiation with an SPF of 30 or greater. Reapply every 2 to 3 hours or after sweating, drying off with a towel, or swimming. Always wear a seat belt when traveling in a car. Always wear a helmet when riding a bicycle or motorcycle.

## 2023-06-19 NOTE — ASSESSMENT & PLAN NOTE
Stable, controlled  No changes  Continue current treatment plan   PDMP Monitoring:    Last PDMP Sherif as Reviewed:  Review User Review Instant Review Result          Last Controlled Substance Monitoring Documentation    Flowsheet Row Refill from 12/19/2019 in [facility]   Periodic Controlled Substance Monitoring No signs of potential drug abuse or diversion identified.  filed at 12/19/2019 1038        Urine Drug Screenings (1 yr)     Drug Panel-PM-HI Res-UR Interp-A  Collected: 7/30/2015 10:34 PM (Final result)              Medication Contract and Consent for Opioid Use Documents Filed     Patient Documents     Type of Document Status Date Received Received By Description    Medication Contract [Status Missing]  JAMEY MAGANA medication agreement

## 2023-06-19 NOTE — PROGRESS NOTES
Medicare Annual Wellness Visit    Kristy Joshi is here for Medication Check    Assessment & Plan   Dissection of carotid artery [facility])  Assessment & Plan:   Monitored by specialist- no acute findings meriting change in the plan  Anxiety  Assessment & Plan:   Stable, controlled  No changes  Continue current treatment plan   PDMP Monitoring:    Last PDMP Sherif as Reviewed:  Review User Review Instant Review Result          Last Controlled Substance Monitoring Documentation      Flowsheet Row Refill from 12/19/2019 in [facility]   Periodic Controlled Substance Monitoring No signs of potential drug abuse or diversion identified.  filed at 12/19/2019 1038          Urine Drug Screenings (1 yr)       Drug Panel-PM-HI Res-UR Interp-A  Collected: 7/30/2015 10:34 PM (Final result)                  Medication Contract and Consent for Opioid Use Documents Filed       Patient Documents       Type of Document Status Date Received Received By Description    Medication Contract [Status Missing]  JAMEY MAGANA medication agreement                    Orders:  -     LORazepam (ATIVAN) 0.5 MG tablet; TAKE ONE TABLET TWICE DAILY AS NEEDED, Disp-60 tablet, R-4Normal  Medicare annual wellness visit, subsequent  Acute renal failure superimposed on stage 4 chronic kidney disease, unspecified acute renal failure type ([facility] [address]  Assessment & Plan:   Stable, controlled  No changes  Continue current treatment plan     Multiple myeloma, remission status unspecified ([facility] [address]  Assessment & Plan:   Monitored by specialist- no acute findings meriting change in the plan  Primary adenocarcinoma of right lung [facility])  Assessment & Plan:   Monitored by specialist- no acute findings meriting change in the plan    Recommendations for Preventive Services Due: see orders and patient instructions/AVS.  Recommended screening schedule for the next 5-10 years is provided to the patient in written form: see Patient Instructions/AVS.     No follow-ups on

## 2023-06-28 ENCOUNTER — OFFICE VISIT (OUTPATIENT)
Dept: FAMILY MEDICINE CLINIC | Age: 78
End: 2023-06-28
Payer: MEDICARE

## 2023-06-28 VITALS
BODY MASS INDEX: 27.96 KG/M2 | DIASTOLIC BLOOD PRESSURE: 80 MMHG | SYSTOLIC BLOOD PRESSURE: 130 MMHG | HEART RATE: 115 BPM | HEIGHT: 58 IN | WEIGHT: 133.2 LBS | OXYGEN SATURATION: 97 %

## 2023-06-28 DIAGNOSIS — N18.4 ACUTE RENAL FAILURE SUPERIMPOSED ON STAGE 4 CHRONIC KIDNEY DISEASE, UNSPECIFIED ACUTE RENAL FAILURE TYPE (HCC): ICD-10-CM

## 2023-06-28 DIAGNOSIS — C34.91 PRIMARY ADENOCARCINOMA OF RIGHT LUNG (HCC): ICD-10-CM

## 2023-06-28 DIAGNOSIS — F41.9 ANXIETY: ICD-10-CM

## 2023-06-28 DIAGNOSIS — C90.00 MULTIPLE MYELOMA, REMISSION STATUS UNSPECIFIED (HCC): ICD-10-CM

## 2023-06-28 DIAGNOSIS — N17.9 ACUTE RENAL FAILURE SUPERIMPOSED ON STAGE 4 CHRONIC KIDNEY DISEASE, UNSPECIFIED ACUTE RENAL FAILURE TYPE (HCC): ICD-10-CM

## 2023-06-28 PROCEDURE — 99213 OFFICE O/P EST LOW 20 MIN: CPT | Performed by: FAMILY MEDICINE

## 2023-06-28 PROCEDURE — 1123F ACP DISCUSS/DSCN MKR DOCD: CPT | Performed by: FAMILY MEDICINE

## 2023-06-28 ASSESSMENT — ENCOUNTER SYMPTOMS
TROUBLE SWALLOWING: 0
CONSTIPATION: 0
SORE THROAT: 0
RHINORRHEA: 0
CHEST TIGHTNESS: 0
BACK PAIN: 0
RECTAL PAIN: 0
EYE REDNESS: 0
EYE PAIN: 0
VOMITING: 0
ABDOMINAL PAIN: 1
ABDOMINAL DISTENTION: 0
COLOR CHANGE: 0
PHOTOPHOBIA: 0
APNEA: 0
COUGH: 0
FACIAL SWELLING: 0
VOICE CHANGE: 0
EYE DISCHARGE: 0
ANAL BLEEDING: 0
CHOKING: 0
DIARRHEA: 0
NAUSEA: 0
SHORTNESS OF BREATH: 0
STRIDOR: 0
BLOOD IN STOOL: 0
EYE ITCHING: 0
SINUS PRESSURE: 0
WHEEZING: 0

## 2023-07-07 ENCOUNTER — HOSPITAL ENCOUNTER (OUTPATIENT)
Dept: CT IMAGING | Age: 78
Discharge: HOME | End: 2023-07-07
Payer: MEDICARE

## 2023-07-07 DIAGNOSIS — C34.11 MALIGNANT NEOPLASM OF UPPER LOBE BRONCHUS, RIGHT (HCC): ICD-10-CM

## 2023-07-07 PROCEDURE — 71250 CT THORAX DX C-: CPT

## 2023-07-19 PROBLEM — Z00.00 MEDICARE ANNUAL WELLNESS VISIT, SUBSEQUENT: Status: RESOLVED | Noted: 2023-06-19 | Resolved: 2023-07-19

## 2023-08-24 ENCOUNTER — OFFICE VISIT (OUTPATIENT)
Dept: FAMILY MEDICINE CLINIC | Age: 78
End: 2023-08-24
Payer: MEDICARE

## 2023-08-24 VITALS
OXYGEN SATURATION: 99 % | DIASTOLIC BLOOD PRESSURE: 82 MMHG | BODY MASS INDEX: 28.01 KG/M2 | SYSTOLIC BLOOD PRESSURE: 124 MMHG | WEIGHT: 134 LBS | HEART RATE: 91 BPM

## 2023-08-24 DIAGNOSIS — C90.00 MULTIPLE MYELOMA, REMISSION STATUS UNSPECIFIED (HCC): ICD-10-CM

## 2023-08-24 DIAGNOSIS — C34.91 PRIMARY ADENOCARCINOMA OF RIGHT LUNG (HCC): ICD-10-CM

## 2023-08-24 DIAGNOSIS — Z01.818 PREOP EXAM FOR INTERNAL MEDICINE: ICD-10-CM

## 2023-08-24 DIAGNOSIS — N17.9 ACUTE RENAL FAILURE SUPERIMPOSED ON STAGE 4 CHRONIC KIDNEY DISEASE, UNSPECIFIED ACUTE RENAL FAILURE TYPE (HCC): ICD-10-CM

## 2023-08-24 DIAGNOSIS — I77.71 DISSECTION OF CAROTID ARTERY (HCC): ICD-10-CM

## 2023-08-24 DIAGNOSIS — N18.4 ACUTE RENAL FAILURE SUPERIMPOSED ON STAGE 4 CHRONIC KIDNEY DISEASE, UNSPECIFIED ACUTE RENAL FAILURE TYPE (HCC): ICD-10-CM

## 2023-08-24 PROCEDURE — 99214 OFFICE O/P EST MOD 30 MIN: CPT | Performed by: NURSE PRACTITIONER

## 2023-08-24 PROCEDURE — 1123F ACP DISCUSS/DSCN MKR DOCD: CPT | Performed by: NURSE PRACTITIONER

## 2023-08-24 ASSESSMENT — ENCOUNTER SYMPTOMS
BACK PAIN: 0
COLOR CHANGE: 0
SINUS PRESSURE: 0
RHINORRHEA: 0
NAUSEA: 0
SHORTNESS OF BREATH: 0
EYE REDNESS: 0
CHEST TIGHTNESS: 0
EYE ITCHING: 0
BLOOD IN STOOL: 0
DIARRHEA: 0
COUGH: 0
WHEEZING: 0
SORE THROAT: 0
CONSTIPATION: 0
ABDOMINAL PAIN: 0
VOMITING: 0

## 2023-08-24 NOTE — ASSESSMENT & PLAN NOTE
Perioperative risk related to the patient's upcoming surgery is considered low. she is cleared for surgery.   Pre-op exam was completed on 8/24/23 10:30 AM.

## 2023-08-24 NOTE — PROGRESS NOTES
Subjective:     Felicity Jo who presentsto the office today for a preoperative consultation at the request of surgeon Dr. Aurora Guardado who plans on performing cataract surgery right eye on 9/12/2023 . This consultation is requested for the specific conditions prompting preoperative evaluation (i.e. because of potential affect on operative risk): none. Planned anesthesia isTopical anesthesia. The patient has the following known anesthesia issues: none. Patient has a bleeding risk of : no recent abnormal bleeding, no remote history of abnormal bleeding. Patient's medications, allergies, past medical, surgical,social and family histories were reviewed and updated as appropriate.     Past Medical History:   Diagnosis Date    THADDEUS (acute kidney injury) ([address]) 7/2/2022    Anxiety 8/4/2022    Bilateral carotid artery stenosis 4/14/2022    Bilateral carotid artery stenosis 4/14/2022    Encounter for cervical Pap smear with pelvic exam *Oct. 11,2019    Negative     Hyperlipidemia     Multiple myeloma ([address]) 7/6/2022    Osteoporosis DEXA - Sept., 2011    Lumbar T score -2.5 and frm neck -1.8    Screening mammogram for high-risk patient November 10, 2011    Negative    Screening mammogram, encounter for *March 8, 2017    Negative    Screening mammogram, encounter for *March 14, 2018    Negative    Screening mammogram, encounter for *March 20, 2019    Negative    Screening mammogram, encounter for *Leidy 15, 2020    Negative    Vitamin D deficiency     Wrist fracture, left Mar., 2016    Left     Past Surgical History:   Procedure Laterality Date    COLONOSCOPY  Dec., 2002 ( 2012 )    Dr. Sienna Stoddard - normal.    COLONOSCOPY  Oct., 2013 ( 2023 )    Rubens Fisher - Normal    COLONOSCOPY N/A 7/6/2022    COLONOSCOPY POLYPECTOMY ABLATION performed by Lauryn Mills MD at [address] 7/6/2022    COLONOSCOPY POLYPECTOMY SNARE/COLD BIOPSY performed by Lauryn Mills MD at [address] BIOPSY RENAL

## 2023-09-05 ENCOUNTER — OFFICE VISIT (OUTPATIENT)
Dept: FAMILY MEDICINE CLINIC | Age: 78
End: 2023-09-05
Payer: MEDICARE

## 2023-09-05 ENCOUNTER — HOSPITAL ENCOUNTER (OUTPATIENT)
Dept: GENERAL RADIOLOGY | Age: 78
Discharge: HOME | End: 2023-09-05
Payer: MEDICARE

## 2023-09-05 VITALS
BODY MASS INDEX: 27.59 KG/M2 | WEIGHT: 132 LBS | HEART RATE: 107 BPM | OXYGEN SATURATION: 98 % | SYSTOLIC BLOOD PRESSURE: 110 MMHG | DIASTOLIC BLOOD PRESSURE: 70 MMHG

## 2023-09-05 DIAGNOSIS — C90.00 MULTIPLE MYELOMA, REMISSION STATUS UNSPECIFIED (HCC): ICD-10-CM

## 2023-09-05 DIAGNOSIS — M54.41 ACUTE RIGHT-SIDED LOW BACK PAIN WITH RIGHT-SIDED SCIATICA: Primary | ICD-10-CM

## 2023-09-05 DIAGNOSIS — M54.41 ACUTE RIGHT-SIDED LOW BACK PAIN WITH RIGHT-SIDED SCIATICA: ICD-10-CM

## 2023-09-05 DIAGNOSIS — M79.651 RIGHT THIGH PAIN: ICD-10-CM

## 2023-09-05 DIAGNOSIS — N17.9 ACUTE RENAL FAILURE SUPERIMPOSED ON STAGE 4 CHRONIC KIDNEY DISEASE, UNSPECIFIED ACUTE RENAL FAILURE TYPE (HCC): ICD-10-CM

## 2023-09-05 DIAGNOSIS — N18.4 ACUTE RENAL FAILURE SUPERIMPOSED ON STAGE 4 CHRONIC KIDNEY DISEASE, UNSPECIFIED ACUTE RENAL FAILURE TYPE (HCC): ICD-10-CM

## 2023-09-05 PROCEDURE — 1123F ACP DISCUSS/DSCN MKR DOCD: CPT | Performed by: FAMILY MEDICINE

## 2023-09-05 PROCEDURE — 72100 X-RAY EXAM L-S SPINE 2/3 VWS: CPT

## 2023-09-05 PROCEDURE — 99214 OFFICE O/P EST MOD 30 MIN: CPT | Performed by: FAMILY MEDICINE

## 2023-09-05 ASSESSMENT — PATIENT HEALTH QUESTIONNAIRE - PHQ9
SUM OF ALL RESPONSES TO PHQ QUESTIONS 1-9: 0
SUM OF ALL RESPONSES TO PHQ QUESTIONS 1-9: 0
1. LITTLE INTEREST OR PLEASURE IN DOING THINGS: 0
2. FEELING DOWN, DEPRESSED OR HOPELESS: 0
SUM OF ALL RESPONSES TO PHQ QUESTIONS 1-9: 0
SUM OF ALL RESPONSES TO PHQ QUESTIONS 1-9: 0
SUM OF ALL RESPONSES TO PHQ9 QUESTIONS 1 & 2: 0

## 2023-09-05 NOTE — ASSESSMENT & PLAN NOTE
Monitored by specialist- no acute findings meriting change in the plan-doubt this is shingles since on BID zovirax

## 2023-09-06 ENCOUNTER — TELEPHONE (OUTPATIENT)
Dept: FAMILY MEDICINE CLINIC | Age: 78
End: 2023-09-06

## 2023-09-06 DIAGNOSIS — S32.000D COMPRESSION FRACTURE OF LUMBAR VERTEBRA WITH ROUTINE HEALING, UNSPECIFIED LUMBAR VERTEBRAL LEVEL, SUBSEQUENT ENCOUNTER: Primary | ICD-10-CM

## 2023-09-06 NOTE — TELEPHONE ENCOUNTER
Pt called to let us know that last night when she went to put her pajamas on she started to itch and  saw 2 red bumps like mosquito bites. She put cortisone lotion on them. She cannot see them and  is not home all day. Her pain is about 75% better. She knows Dr. Swann Gains was worried about possible shingles. Pt couldn't tell me which side or if on 1 side alone. Just that 2 bumps and that they were itchy and the cortisone lotion helped a little.

## 2023-09-18 ENCOUNTER — TELEPHONE (OUTPATIENT)
Dept: FAMILY MEDICINE CLINIC | Age: 78
End: 2023-09-18

## 2023-09-18 NOTE — TELEPHONE ENCOUNTER
[address] Pre admissions have a question in regards to pt's pre-op done 08/24/2023. The office would like to know more about \"   Dissection of carotid artery ([address])   Monitored by specialist- no acute findings meriting change in the plan\" documented in [address]

## 2023-09-23 PROBLEM — Z01.818 PREOP EXAM FOR INTERNAL MEDICINE: Status: RESOLVED | Noted: 2023-06-19 | Resolved: 2023-09-23

## 2023-09-25 ENCOUNTER — TRANSCRIBE ORDERS (OUTPATIENT)
Dept: ADMINISTRATIVE | Age: 78
End: 2023-09-25

## 2023-09-25 DIAGNOSIS — C90.00 MULTIPLE MYELOMA NOT HAVING ACHIEVED REMISSION (HCC): Primary | ICD-10-CM

## 2023-09-27 ENCOUNTER — OFFICE VISIT (OUTPATIENT)
Dept: FAMILY MEDICINE CLINIC | Age: 78
End: 2023-09-27

## 2023-09-27 VITALS
BODY MASS INDEX: 27.88 KG/M2 | OXYGEN SATURATION: 98 % | WEIGHT: 132.8 LBS | HEIGHT: 58 IN | DIASTOLIC BLOOD PRESSURE: 76 MMHG | HEART RATE: 108 BPM | SYSTOLIC BLOOD PRESSURE: 120 MMHG

## 2023-09-27 DIAGNOSIS — H26.9 CATARACT, UNSPECIFIED CATARACT TYPE, UNSPECIFIED LATERALITY: ICD-10-CM

## 2023-09-27 DIAGNOSIS — Z01.818 PREOP EXAMINATION: ICD-10-CM

## 2023-09-27 DIAGNOSIS — C90.00 MULTIPLE MYELOMA, REMISSION STATUS UNSPECIFIED (HCC): ICD-10-CM

## 2023-09-27 DIAGNOSIS — C34.91 PRIMARY ADENOCARCINOMA OF RIGHT LUNG (HCC): Primary | ICD-10-CM

## 2023-09-27 DIAGNOSIS — N17.9 ACUTE RENAL FAILURE SUPERIMPOSED ON STAGE 4 CHRONIC KIDNEY DISEASE, UNSPECIFIED ACUTE RENAL FAILURE TYPE (HCC): ICD-10-CM

## 2023-09-27 DIAGNOSIS — N18.4 ACUTE RENAL FAILURE SUPERIMPOSED ON STAGE 4 CHRONIC KIDNEY DISEASE, UNSPECIFIED ACUTE RENAL FAILURE TYPE (HCC): ICD-10-CM

## 2023-09-27 LAB
ALBUMIN SERPL-MCNC: 4.2 G/DL (ref 3.4–5)
ALBUMIN/GLOB SERPL: 2.2 {RATIO} (ref 1.1–2.2)
ALP SERPL-CCNC: 75 U/L (ref 40–129)
ALT SERPL-CCNC: 9 U/L (ref 10–40)
ANION GAP SERPL CALCULATED.3IONS-SCNC: 12 MMOL/L (ref 3–16)
APTT BLD: 24.2 SEC (ref 22.7–35.9)
AST SERPL-CCNC: 12 U/L (ref 15–37)
BASOPHILS # BLD: 0.1 K/UL (ref 0–0.2)
BASOPHILS NFR BLD: 0.8 %
BILIRUB SERPL-MCNC: <0.2 MG/DL (ref 0–1)
BUN SERPL-MCNC: 34 MG/DL (ref 7–20)
CALCIUM SERPL-MCNC: 8.3 MG/DL (ref 8.3–10.6)
CHLORIDE SERPL-SCNC: 108 MMOL/L (ref 99–110)
CO2 SERPL-SCNC: 21 MMOL/L (ref 21–32)
CREAT SERPL-MCNC: 1.8 MG/DL (ref 0.6–1.2)
DEPRECATED RDW RBC AUTO: 14.1 % (ref 12.4–15.4)
EOSINOPHIL # BLD: 0.1 K/UL (ref 0–0.6)
EOSINOPHIL NFR BLD: 0.6 %
GFR SERPLBLD CREATININE-BSD FMLA CKD-EPI: 28 ML/MIN/{1.73_M2}
GLUCOSE SERPL-MCNC: 88 MG/DL (ref 70–99)
HCT VFR BLD AUTO: 34.4 % (ref 36–48)
HGB BLD-MCNC: 11.4 G/DL (ref 12–16)
INR PPP: 0.96 (ref 0.84–1.16)
LYMPHOCYTES # BLD: 2.2 K/UL (ref 1–5.1)
LYMPHOCYTES NFR BLD: 21.1 %
MCH RBC QN AUTO: 29.7 PG (ref 26–34)
MCHC RBC AUTO-ENTMCNC: 33.1 G/DL (ref 31–36)
MCV RBC AUTO: 89.9 FL (ref 80–100)
MONOCYTES # BLD: 1.3 K/UL (ref 0–1.3)
MONOCYTES NFR BLD: 12.6 %
NEUTROPHILS # BLD: 6.7 K/UL (ref 1.7–7.7)
NEUTROPHILS NFR BLD: 64.9 %
PLATELET # BLD AUTO: 274 K/UL (ref 135–450)
PMV BLD AUTO: 9 FL (ref 5–10.5)
POTASSIUM SERPL-SCNC: 4.3 MMOL/L (ref 3.5–5.1)
PROT SERPL-MCNC: 6.1 G/DL (ref 6.4–8.2)
PROTHROMBIN TIME: 12.8 SEC (ref 11.5–14.8)
RBC # BLD AUTO: 3.82 M/UL (ref 4–5.2)
SODIUM SERPL-SCNC: 141 MMOL/L (ref 136–145)
WBC # BLD AUTO: 10.3 K/UL (ref 4–11)

## 2023-09-27 NOTE — PROGRESS NOTES
Subjective:     To Bassett who presentsto the office today for a preoperative consultation at the request of surgeon Dr. Kellee Chung who plans on performing cataract surgery on 0/11 Planned anesthesia is: Unknown, patient is not aware, nothing is documented in EMR The patient has the following known anesthesia issues:  Denies    Patient has a bleeding risk of : Denies  Patient's medications, allergies, past medical, surgical,social and family histories were reviewed and updated as appropriate.    -Patient takes ASA 81 mg QD for clot in eye, denies being on other blood thinners     #CKD  -Last RFP below:   Lab Results   Component Value Date    CREATININE 2.2 (H) 08/07/2023    BUN 35 (H) 08/07/2023     08/07/2023    K 5.2 (H) 08/07/2023     08/07/2023    CO2 25 08/07/2023    -Needs updated labs, not currently on HD       #MM  -Follows with a specialist, undergoing treatments, currently has upcoming bloodwork on Monday    #Adenocarcinoma of lung  -Managed with specialist, underwent radiation earlier this year, being monitored   Past Medical History:   Diagnosis Date    THADDEUS (acute kidney injury) ([address]) 7/2/2022    Anxiety 8/4/2022    Bilateral carotid artery stenosis 4/14/2022    Bilateral carotid artery stenosis 4/14/2022    Encounter for cervical Pap smear with pelvic exam *Oct. 11,2019    Negative     Hyperlipidemia     Multiple myeloma ([address]) 7/6/2022    Osteoporosis DEXA - Sept., 2011    Lumbar T score -2.5 and frm neck -1.8    Screening mammogram for high-risk patient November 10, 2011    Negative    Screening mammogram, encounter for *March 8, 2017    Negative    Screening mammogram, encounter for *March 14, 2018    Negative    Screening mammogram, encounter for *March 20, 2019    Negative    Screening mammogram, encounter for *Leidy 15, 2020    Negative    Vitamin D deficiency     Wrist fracture, left Mar., 2016    Left     Past Surgical History:   Procedure Laterality Date    COLONOSCOPY  Dec., 2002 (

## 2023-09-29 ENCOUNTER — HOSPITAL ENCOUNTER (OUTPATIENT)
Dept: CT IMAGING | Age: 78
Discharge: HOME | End: 2023-09-29
Payer: MEDICARE

## 2023-09-29 DIAGNOSIS — C90.00 MULTIPLE MYELOMA NOT HAVING ACHIEVED REMISSION (HCC): ICD-10-CM

## 2023-09-29 PROCEDURE — 71250 CT THORAX DX C-: CPT

## 2023-10-03 ENCOUNTER — OFFICE VISIT (OUTPATIENT)
Dept: PULMONOLOGY | Age: 78
End: 2023-10-03
Payer: MEDICARE

## 2023-10-03 VITALS
HEIGHT: 58 IN | SYSTOLIC BLOOD PRESSURE: 156 MMHG | BODY MASS INDEX: 28.38 KG/M2 | WEIGHT: 135.2 LBS | HEART RATE: 98 BPM | DIASTOLIC BLOOD PRESSURE: 80 MMHG | OXYGEN SATURATION: 98 %

## 2023-10-03 DIAGNOSIS — C34.91 PRIMARY ADENOCARCINOMA OF RIGHT LUNG (HCC): ICD-10-CM

## 2023-10-03 DIAGNOSIS — C90.00 MULTIPLE MYELOMA, REMISSION STATUS UNSPECIFIED (HCC): ICD-10-CM

## 2023-10-03 DIAGNOSIS — R93.89 ABNORMAL CT OF THE CHEST: Primary | ICD-10-CM

## 2023-10-03 PROCEDURE — 1123F ACP DISCUSS/DSCN MKR DOCD: CPT | Performed by: INTERNAL MEDICINE

## 2023-10-03 PROCEDURE — 99204 OFFICE O/P NEW MOD 45 MIN: CPT | Performed by: INTERNAL MEDICINE

## 2023-10-03 NOTE — PROGRESS NOTES
risk of pneumonia although does not really cause a pneumonitis like Keytruda and some other MAB drugs. Other inflammatory processes are also in differential such as cryptogenic organizing pneumonia or hypersensitivity pneumonitis     Cancer is thought to be quite unlikely given the appearance and size of opacities that are new in 3 months. Biofire did not detect any viruses although if the infection was a few weeks ago and we are seeing residual on CT then the PCR could still be negative. Given her multiple myeloma and Daratumumab I think it is best to further rule out infection with a Bronch with BAL RAJENDRA and send for immuncompromised panel.     I will reach out to Sindhu rutherfordrmariza to discuss scheduling the bronch

## 2023-10-05 ENCOUNTER — TELEPHONE (OUTPATIENT)
Dept: PULMONOLOGY | Age: 78
End: 2023-10-05

## 2023-10-05 DIAGNOSIS — C90.00 MULTIPLE MYELOMA, REMISSION STATUS UNSPECIFIED (HCC): ICD-10-CM

## 2023-10-05 DIAGNOSIS — C34.91 PRIMARY ADENOCARCINOMA OF RIGHT LUNG (HCC): ICD-10-CM

## 2023-10-05 DIAGNOSIS — R93.89 ABNORMAL CT OF THE CHEST: Primary | ICD-10-CM

## 2023-10-05 NOTE — TELEPHONE ENCOUNTER
Patient has been set up for a Bronchoscopy w/BAL    Where: [facility]   Day:  [address] 10/6/2023  Arrive:  11:30 am  Procedure Time: 1 pm    - Stop ALL blood thinners 5 days prior to your procedure   - Nothing to eat or drink after midnight prior to your procedure  - Arrive 1 hour and 30 minutes before BRONCHOSCOPY BAL  - Sign in with Admitting/Registration at the Main Entrance   - Please have transportation arrangements from hospital after you procedure     Dr Mikell Sandifer spoke to Robert (Mgr over Endo) to set up bronch. I have faxed, and scanned orders. Left message on pt's vm with the above instructions. I asked that she call the office if she has any questions.

## 2023-10-05 NOTE — PROGRESS NOTES
Respiratory viral panel including COVID-19 was negative  We will proceed with bronchoscopy with BAL left upper lobe tomorrow at approximately 1 PM  Patient aware and agrees to proceed

## 2023-10-06 ENCOUNTER — ANESTHESIA (OUTPATIENT)
Dept: ENDOSCOPY | Age: 78
End: 2023-10-06
Payer: MEDICARE

## 2023-10-06 ENCOUNTER — HOSPITAL ENCOUNTER (OUTPATIENT)
Age: 78
Setting detail: OUTPATIENT SURGERY
Discharge: HOME | End: 2023-10-06
Attending: INTERNAL MEDICINE | Admitting: INTERNAL MEDICINE
Payer: MEDICARE

## 2023-10-06 ENCOUNTER — ANESTHESIA EVENT (OUTPATIENT)
Dept: ENDOSCOPY | Age: 78
End: 2023-10-06
Payer: MEDICARE

## 2023-10-06 VITALS
RESPIRATION RATE: 16 BRPM | HEART RATE: 87 BPM | SYSTOLIC BLOOD PRESSURE: 154 MMHG | BODY MASS INDEX: 29.81 KG/M2 | TEMPERATURE: 97 F | OXYGEN SATURATION: 95 % | HEIGHT: 58 IN | WEIGHT: 142 LBS | DIASTOLIC BLOOD PRESSURE: 78 MMHG

## 2023-10-06 LAB
APPEARANCE BRONCH: CLEAR
CILIATED BAL QL: 2 %
CLOT SPEC QL: ABNORMAL
COLOR BRONCH: COLORLESS
LYMPHOCYTES NFR BRONCH MANUAL: 1 % (ref 5–10)
MACROPHAGES NFR BRONCH MANUAL: 28 % (ref 90–95)
MONOCYTES NFR BRONCH MANUAL: 8 %
MONONUC CELLS NFR FLD MANUAL: 58 %
NEUTROPHILS NFR BRONCH MANUAL: 3 % (ref 5–10)
NUC CELL # BRONCH MANUAL: 216 /CUMM
PATH REV: YES
RBC # FLD MANUAL: 1200 /CUMM
TOTAL CELLS COUNTED BRONCH: 100

## 2023-10-06 PROCEDURE — 3609010800 HC BRONCHOSCOPY ALVEOLAR LAVAGE: Performed by: INTERNAL MEDICINE

## 2023-10-06 PROCEDURE — 7100000001 HC PACU RECOVERY - ADDTL 15 MIN: Performed by: INTERNAL MEDICINE

## 2023-10-06 PROCEDURE — 87633 RESP VIRUS 12-25 TARGETS: CPT

## 2023-10-06 PROCEDURE — 87102 FUNGUS ISOLATION CULTURE: CPT

## 2023-10-06 PROCEDURE — 6360000002 HC RX W HCPCS

## 2023-10-06 PROCEDURE — 2500000003 HC RX 250 WO HCPCS

## 2023-10-06 PROCEDURE — 87305 ASPERGILLUS AG IA: CPT

## 2023-10-06 PROCEDURE — 7100000011 HC PHASE II RECOVERY - ADDTL 15 MIN: Performed by: INTERNAL MEDICINE

## 2023-10-06 PROCEDURE — 88112 CYTOPATH CELL ENHANCE TECH: CPT

## 2023-10-06 PROCEDURE — 87254 VIRUS INOCULATION SHELL VIA: CPT

## 2023-10-06 PROCEDURE — 31624 DX BRONCHOSCOPE/LAVAGE: CPT | Performed by: INTERNAL MEDICINE

## 2023-10-06 PROCEDURE — 88305 TISSUE EXAM BY PATHOLOGIST: CPT

## 2023-10-06 PROCEDURE — 7100000010 HC PHASE II RECOVERY - FIRST 15 MIN: Performed by: INTERNAL MEDICINE

## 2023-10-06 PROCEDURE — 87205 SMEAR GRAM STAIN: CPT

## 2023-10-06 PROCEDURE — 3700000001 HC ADD 15 MINUTES (ANESTHESIA): Performed by: INTERNAL MEDICINE

## 2023-10-06 PROCEDURE — 88312 SPECIAL STAINS GROUP 1: CPT

## 2023-10-06 PROCEDURE — 2580000003 HC RX 258: Performed by: ANESTHESIOLOGY

## 2023-10-06 PROCEDURE — 87206 SMEAR FLUORESCENT/ACID STAI: CPT

## 2023-10-06 PROCEDURE — 87253 VIRUS INOCULATE TISSUE ADDL: CPT

## 2023-10-06 PROCEDURE — 87070 CULTURE OTHR SPECIMN AEROBIC: CPT

## 2023-10-06 PROCEDURE — 87252 VIRUS INOCULATION TISSUE: CPT

## 2023-10-06 PROCEDURE — 87081 CULTURE SCREEN ONLY: CPT

## 2023-10-06 PROCEDURE — 87116 MYCOBACTERIA CULTURE: CPT

## 2023-10-06 PROCEDURE — 3700000000 HC ANESTHESIA ATTENDED CARE: Performed by: INTERNAL MEDICINE

## 2023-10-06 PROCEDURE — 2709999900 HC NON-CHARGEABLE SUPPLY: Performed by: INTERNAL MEDICINE

## 2023-10-06 PROCEDURE — 89051 BODY FLUID CELL COUNT: CPT

## 2023-10-06 PROCEDURE — 7100000000 HC PACU RECOVERY - FIRST 15 MIN: Performed by: INTERNAL MEDICINE

## 2023-10-06 RX ORDER — KETAMINE HCL IN NACL, ISO-OSM 20 MG/2 ML
SYRINGE (ML) INJECTION PRN
Status: DISCONTINUED | OUTPATIENT
Start: 2023-10-06 | End: 2023-10-06 | Stop reason: SDUPTHER

## 2023-10-06 RX ORDER — SODIUM CHLORIDE 0.9 % (FLUSH) 0.9 %
5-40 SYRINGE (ML) INJECTION PRN
Status: DISCONTINUED | OUTPATIENT
Start: 2023-10-06 | End: 2023-10-06 | Stop reason: HOSPADM

## 2023-10-06 RX ORDER — PROCHLORPERAZINE EDISYLATE 5 MG/ML
5 INJECTION INTRAMUSCULAR; INTRAVENOUS
Status: DISCONTINUED | OUTPATIENT
Start: 2023-10-06 | End: 2023-10-06 | Stop reason: HOSPADM

## 2023-10-06 RX ORDER — ONDANSETRON 2 MG/ML
4 INJECTION INTRAMUSCULAR; INTRAVENOUS
Status: DISCONTINUED | OUTPATIENT
Start: 2023-10-06 | End: 2023-10-06 | Stop reason: HOSPADM

## 2023-10-06 RX ORDER — SODIUM CHLORIDE 9 MG/ML
INJECTION, SOLUTION INTRAVENOUS PRN
Status: DISCONTINUED | OUTPATIENT
Start: 2023-10-06 | End: 2023-10-06 | Stop reason: HOSPADM

## 2023-10-06 RX ORDER — ACETAMINOPHEN 325 MG/1
650 TABLET ORAL
Status: DISCONTINUED | OUTPATIENT
Start: 2023-10-06 | End: 2023-10-06 | Stop reason: HOSPADM

## 2023-10-06 RX ORDER — ONDANSETRON 2 MG/ML
INJECTION INTRAMUSCULAR; INTRAVENOUS PRN
Status: DISCONTINUED | OUTPATIENT
Start: 2023-10-06 | End: 2023-10-06 | Stop reason: SDUPTHER

## 2023-10-06 RX ORDER — PROPOFOL 10 MG/ML
INJECTION, EMULSION INTRAVENOUS CONTINUOUS PRN
Status: DISCONTINUED | OUTPATIENT
Start: 2023-10-06 | End: 2023-10-06 | Stop reason: SDUPTHER

## 2023-10-06 RX ORDER — LEVOFLOXACIN 750 MG/1
750 TABLET ORAL EVERY OTHER DAY
Qty: 4 TABLET | Refills: 0 | Status: SHIPPED | OUTPATIENT
Start: 2023-10-06 | End: 2023-10-14

## 2023-10-06 RX ORDER — IPRATROPIUM BROMIDE AND ALBUTEROL SULFATE 2.5; .5 MG/3ML; MG/3ML
1 SOLUTION RESPIRATORY (INHALATION)
Status: DISCONTINUED | OUTPATIENT
Start: 2023-10-06 | End: 2023-10-06 | Stop reason: HOSPADM

## 2023-10-06 RX ORDER — DEXAMETHASONE SODIUM PHOSPHATE 4 MG/ML
INJECTION, SOLUTION INTRA-ARTICULAR; INTRALESIONAL; INTRAMUSCULAR; INTRAVENOUS; SOFT TISSUE PRN
Status: DISCONTINUED | OUTPATIENT
Start: 2023-10-06 | End: 2023-10-06 | Stop reason: SDUPTHER

## 2023-10-06 RX ORDER — SODIUM CHLORIDE, SODIUM LACTATE, POTASSIUM CHLORIDE, CALCIUM CHLORIDE 600; 310; 30; 20 MG/100ML; MG/100ML; MG/100ML; MG/100ML
INJECTION, SOLUTION INTRAVENOUS CONTINUOUS
Status: DISCONTINUED | OUTPATIENT
Start: 2023-10-06 | End: 2023-10-06 | Stop reason: HOSPADM

## 2023-10-06 RX ORDER — HYDROMORPHONE HYDROCHLORIDE 1 MG/ML
0.5 INJECTION, SOLUTION INTRAMUSCULAR; INTRAVENOUS; SUBCUTANEOUS EVERY 5 MIN PRN
Status: DISCONTINUED | OUTPATIENT
Start: 2023-10-06 | End: 2023-10-06 | Stop reason: HOSPADM

## 2023-10-06 RX ORDER — PROPOFOL 10 MG/ML
INJECTION, EMULSION INTRAVENOUS PRN
Status: DISCONTINUED | OUTPATIENT
Start: 2023-10-06 | End: 2023-10-06 | Stop reason: SDUPTHER

## 2023-10-06 RX ORDER — SUCCINYLCHOLINE CHLORIDE 20 MG/ML
INJECTION INTRAMUSCULAR; INTRAVENOUS PRN
Status: DISCONTINUED | OUTPATIENT
Start: 2023-10-06 | End: 2023-10-06 | Stop reason: SDUPTHER

## 2023-10-06 RX ORDER — LIDOCAINE HYDROCHLORIDE 20 MG/ML
INJECTION, SOLUTION EPIDURAL; INFILTRATION; INTRACAUDAL; PERINEURAL PRN
Status: DISCONTINUED | OUTPATIENT
Start: 2023-10-06 | End: 2023-10-06 | Stop reason: SDUPTHER

## 2023-10-06 RX ORDER — FENTANYL CITRATE 50 UG/ML
25 INJECTION, SOLUTION INTRAMUSCULAR; INTRAVENOUS EVERY 5 MIN PRN
Status: DISCONTINUED | OUTPATIENT
Start: 2023-10-06 | End: 2023-10-06 | Stop reason: HOSPADM

## 2023-10-06 RX ORDER — SODIUM CHLORIDE 0.9 % (FLUSH) 0.9 %
5-40 SYRINGE (ML) INJECTION EVERY 12 HOURS SCHEDULED
Status: DISCONTINUED | OUTPATIENT
Start: 2023-10-06 | End: 2023-10-06 | Stop reason: HOSPADM

## 2023-10-06 RX ORDER — LABETALOL HYDROCHLORIDE 5 MG/ML
10 INJECTION, SOLUTION INTRAVENOUS
Status: DISCONTINUED | OUTPATIENT
Start: 2023-10-06 | End: 2023-10-06 | Stop reason: HOSPADM

## 2023-10-06 RX ORDER — ESMOLOL HYDROCHLORIDE 10 MG/ML
INJECTION INTRAVENOUS PRN
Status: DISCONTINUED | OUTPATIENT
Start: 2023-10-06 | End: 2023-10-06 | Stop reason: SDUPTHER

## 2023-10-06 RX ORDER — ROCURONIUM BROMIDE 10 MG/ML
INJECTION, SOLUTION INTRAVENOUS PRN
Status: DISCONTINUED | OUTPATIENT
Start: 2023-10-06 | End: 2023-10-06 | Stop reason: SDUPTHER

## 2023-10-06 RX ADMIN — ONDANSETRON 4 MG: 2 INJECTION INTRAMUSCULAR; INTRAVENOUS at 13:49

## 2023-10-06 RX ADMIN — SODIUM CHLORIDE, POTASSIUM CHLORIDE, SODIUM LACTATE AND CALCIUM CHLORIDE: 600; 310; 30; 20 INJECTION, SOLUTION INTRAVENOUS at 14:23

## 2023-10-06 RX ADMIN — SUGAMMADEX 200 MG: 100 INJECTION, SOLUTION INTRAVENOUS at 14:12

## 2023-10-06 RX ADMIN — SUCCINYLCHOLINE CHLORIDE 100 MG: 20 INJECTION, SOLUTION INTRAMUSCULAR; INTRAVENOUS; PARENTERAL at 13:59

## 2023-10-06 RX ADMIN — PROPOFOL 150 MCG/KG/MIN: 10 INJECTION, EMULSION INTRAVENOUS at 13:51

## 2023-10-06 RX ADMIN — PROPOFOL 20 MG: 10 INJECTION, EMULSION INTRAVENOUS at 13:53

## 2023-10-06 RX ADMIN — PROPOFOL 20 MG: 10 INJECTION, EMULSION INTRAVENOUS at 13:52

## 2023-10-06 RX ADMIN — PROPOFOL 50 MG: 10 INJECTION, EMULSION INTRAVENOUS at 13:51

## 2023-10-06 RX ADMIN — DEXAMETHASONE SODIUM PHOSPHATE 4 MG: 4 INJECTION, SOLUTION INTRAMUSCULAR; INTRAVENOUS at 14:05

## 2023-10-06 RX ADMIN — SODIUM CHLORIDE, POTASSIUM CHLORIDE, SODIUM LACTATE AND CALCIUM CHLORIDE: 600; 310; 30; 20 INJECTION, SOLUTION INTRAVENOUS at 11:58

## 2023-10-06 RX ADMIN — Medication 20 MG: at 13:51

## 2023-10-06 RX ADMIN — ROCURONIUM BROMIDE 20 MG: 10 INJECTION, SOLUTION INTRAVENOUS at 14:05

## 2023-10-06 RX ADMIN — LIDOCAINE HYDROCHLORIDE 100 MG: 20 INJECTION, SOLUTION EPIDURAL; INFILTRATION; INTRACAUDAL; PERINEURAL at 13:51

## 2023-10-06 RX ADMIN — Medication 20 MG: at 13:53

## 2023-10-06 RX ADMIN — ESMOLOL HYDROCHLORIDE 20 MG: 10 INJECTION, SOLUTION INTRAVENOUS at 14:18

## 2023-10-06 ASSESSMENT — PAIN SCALES - GENERAL
PAINLEVEL_OUTOF10: 0
PAINLEVEL_OUTOF10: 0

## 2023-10-06 NOTE — ANESTHESIA POSTPROCEDURE EVALUATION
Department of Anesthesiology  Postprocedure Note    Patient: Delia Cancino  MRN: [id number]  YOB: 1945  Date of evaluation: 10/6/2023      Procedure Summary     Date: 10/06/23 Room / Location: [facility] 01 / The [address] [facility]    Anesthesia Start: 1343 Anesthesia Stop: 1428    Procedure: BRONCHOSCOPY ALVEOLAR LAVAGE Diagnosis:       Abnormal CT scan      Pneumonia due to infectious organism, unspecified laterality, unspecified part of lung      (Abnormal CT scan [R93.89])      (Pneumonia due to infectious organism, unspecified laterality, unspecified part of lung [J18.9])    Surgeons: Delvin Nation MD Responsible Provider: Susie Chery MD    Anesthesia Type: general ASA Status: 3          Anesthesia Type: No value filed.     Esau Phase I: Esau Score: 10    Esau Phase II: Esau Score: 10      Anesthesia Post Evaluation    Patient location during evaluation: PACU  Level of consciousness: awake  Complications: no  Multimodal analgesia pain management approach

## 2023-10-06 NOTE — H&P
H&P  PROCEDURE:  BRONCHOSCOPY WITH BRONCHOALVEOLAR LAVAGE LEFT UPPER LOBE    HPI:    The patient is 66 y.o. female who presents today for Bronchoscopy with BAL RAJENDRA for evaluation of an abnormal CT Chest with bilateral subpleural GGO/consolidation. 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 She is a lifelong nonsmoker and has no known chronic lung disease. She does have multiple myeloma on Daratumumab monthly. She states hse had her treatment ~ 2 weeks ago and after that did feel some fatigue and myalgias which she attributed to her treatment. It was hort lived and she had no and does not have now any fevers, chills, night sweats, dyspnea, anorexia, weight loss, cough, CP or wheezing. Allergies and medications have been reviewed    HENT: Airway patent and reviewed  Cardiovascular: Normal rate, regular rhythm, normal heart sounds. Pulmonary/Chest: No wheezes. No rhonchi. No rales. Abdominal: Soft. Bowel sounds are normal. No distension. ASA CLASS      II. Mild systemic disease        Mallampati: Per anesthesia    Sedation plan:      Sedation per anesthesia       Post Procedure Plan   Return to same level of care     The risks and benefits as well as alternatives to the procedure have been discussed with the patient. The patient understands and agrees to proceed. A/P     Biofire did not detect any viruses although if the infection was a few weeks ago and we are seeing residual on CT then the PCR could still be negative. Given her multiple myeloma and Daratumumab I think it is best to further rule out infection with a Bronch with BAL RAJENDRA and send for immuncompromised panel.     Kena Montero MD

## 2023-10-06 NOTE — ANESTHESIA POSTPROCEDURE EVALUATION
Department of Anesthesiology  Postprocedure Note    Patient: Rosita Gooden  MRN: [id number]  YOB: 1945  Date of evaluation: 10/6/2023      Procedure Summary     Date: 10/06/23 Room / [facility]: [facility] [address]    Anesthesia Start: 1343 Anesthesia Stop: 1428    Procedure: BRONCHOSCOPY ALVEOLAR LAVAGE Diagnosis:       Abnormal CT scan      Pneumonia due to infectious organism, unspecified laterality, unspecified part of lung      (Abnormal CT scan [R93.89])      (Pneumonia due to infectious organism, unspecified laterality, unspecified part of lung [J18.9])    Surgeons: Kristie Martinez MD Responsible Provider: Jasson Tyson MD    Anesthesia Type: general ASA Status: 3          Anesthesia Type: No value filed.     Esau Phase I: Esau Score: 10    Esau Phase II: Esau Score: 10      Anesthesia Post Evaluation    Patient location during evaluation: PACU  Level of consciousness: awake  Complications: no  Multimodal analgesia pain management approach

## 2023-10-06 NOTE — DISCHARGE INSTRUCTIONS
ENDOSCOPY DISCHARGE INSTRUCTIONS:    Call the physician that did your procedure for any questions or concerns:     DR. Deeann Bosworth:  [phone]            ACTIVITY:    There are potential side effects to the medications used for sedation and anesthesia during your procedure. These include:  Dizziness or light-headedness, confusion or memory loss, delayed reaction times, loss of coordination, nausea and vomiting. Because of your increased risk for injury, we ask that you observe the following precautions: For the next 24 hours,  DO NOT operate an automobile, bicycle, motorcycle, , power tools or large equipment of any kind. Do not drink alcohol, sign any legal documents or make any legal decisions for 24 hours. Do not bend your head over lower than your heart. DO sit on the side of bed/couch awhile before getting up. Plan on bedrest or quiet relaxation today. You may resume normal activities in 24 hours. DIET:    Your first meal today should be light, avoiding spicy and fatty foods. If you tolerate this first meal, then you may advance to your regular diet unless otherwise advised by your physician. NORMAL SYMPTOMS:  Mild sore throat  Coughing up small amounts bloody secretions        NOTIFY YOUR PHYSICIAN IF THESE SYMPTOMS OCCUR:  1. Fever (greater than 100)    2. Severe pain               5.  Excessive bleeding with cough  3. Nausea and vomiting  6. Chest pain                                                                    4. Chills      7. Shortness of breath    ADDITIONAL INSTRUCTIONS:    Next Steps: Schedule an appointment as soon as possible for a visit in 8 week(s)  Appointment:   Instructions:  Will order a CT chest to be done in 8 weeks, to be performed prior to office appointment    Test results:      Educational Information:     Follow up appointment:                    DIET:    If it is painful to swallow, start with cold drinks, popsicles, and ice cream. Avoid orange or

## 2023-10-06 NOTE — PROGRESS NOTES
Patient admitted to PACU # 10 from OR at [address] [place] [address] per Dr. Deeann Bosworth. Attached to PACU monitoring system and report received from anesthesia provider. Pt with nonproductive cough. Pt arrived to pacu on 6 L NC. Pt NSR on monitor. PT NSR on monitor. Will continue to monitor.

## 2023-10-06 NOTE — PROCEDURES
PROCEDURE:  BRONCHOSCOPY WITH BRONCHOALVEOLAR LAVAGE LEFT UPPER LOBE     The risks and benefits as well as alternatives to the procedure have been discussed with the patient. The patient understands and agrees to proceed. DESCRIPTION OF PROCEDURE: A time out was taken. Once patient was appropriately sedated by anesthesia, the scope was passed with ease via the mouth. Her epiglottis was larger than expected, floppy and overlayed her tracheal inlet. I was able to advance the bronchoscope under the epiglottis and visualize the vocal cords. Due to laryngospasm it was hard to advance the bronchoscope into the trachea. Patient did start to have oxygen desaturation so the bronchoscope was removed and Ambu bag was used to bring the oxygen saturation back up to 100%. At that time, anesthesia and I decided it would be safest to place an endotracheal tube for the remainder of the procedure. Endotracheal tube was placed over the bronchoscope and Parada was used to visualize the airway. I was then able to advance the bronchoscope into the trachea and slide the endotracheal tube off the bronchoscope and positioned the endotracheal tube in the mid trachea. Bronchoscope was removed, ET tube secured and patient was attached to the anesthesia machine. Bronchoscope was then reinserted into the endotracheal tube. A complete airway inspection was performed. No endobronchial lesions were identified. Mucosa appeared normal.  There is no significant secretions noted in the airway. A Bronchoalveolar lavage was obtained from the left upper lobe segment corresponding with the largest airspace disease on CT chest. A total of 110 ml normal saline instilled and 33 ml clear fluid recovered. BAL was sent for immunocompromised panel    EBL 0    The patient tolerated the procedure well. Recovery will be per endoscopy protocol.     I will send in a prescription for Levaquin 750 mg every other day, adjusted for her creatinine

## 2023-10-07 LAB
ACID FAST STN SPEC QL: NORMAL
LOEFFLER MB STN SPEC: NORMAL
PNEUMONIA PANEL MOLECULAR: NORMAL
REPORT: NORMAL

## 2023-10-08 LAB
BACTERIA SPEC RESP CULT: NORMAL
GRAM STN SPEC: NORMAL
PRELIMINARY: NORMAL

## 2023-10-09 LAB
ASPERGILLUS GALACTO AG: NEGATIVE
GALACTOMANNAN AG SERPL IA-ACNC: 0.08

## 2023-10-10 LAB — FINAL REPORT: NORMAL

## 2023-10-11 LAB — FINAL REPORT: NORMAL

## 2023-10-13 ENCOUNTER — TELEPHONE (OUTPATIENT)
Dept: PULMONOLOGY | Age: 78
End: 2023-10-13

## 2023-10-13 ENCOUNTER — TELEPHONE (OUTPATIENT)
Dept: FAMILY MEDICINE CLINIC | Age: 78
End: 2023-10-13

## 2023-10-13 NOTE — TELEPHONE ENCOUNTER
Zara Hamilton is having cataract sx on Wednesday. [facility] would like sx clearance for MAC Anesthesia.   This can be faxed to [phone]

## 2023-10-13 NOTE — TELEPHONE ENCOUNTER
Pt was seen for pre-op 09/27/2023, pt was cleared for surgery then but has since contracted pneumonia. Pt was treated by pulmonologist and is feeling better. [place] wants to make sure it is okay to proceed with surgery.

## 2023-10-13 NOTE — TELEPHONE ENCOUNTER
I talked with [facility] and let them know that Dr. Little Murillo gave the ok for her to proceed with surgery.

## 2023-10-15 LAB
FINAL REPORT: NORMAL
PRELIMINARY: NORMAL

## 2023-10-16 DIAGNOSIS — R93.89 ABNORMAL CT OF THE CHEST: Primary | ICD-10-CM

## 2023-10-16 LAB
FUNGUS SPEC CULT: NORMAL
LOEFFLER MB STN SPEC: NORMAL

## 2023-10-16 NOTE — TELEPHONE ENCOUNTER
[facility] calls again requesting clearance. Apparently patient told them that she had been put on ABX following a PET scan result. There is not a record of that in her chart. Dr. Loy Schwab was contacted. He confirms that she is on ABX. He states that she is okay to have eye surgery. Update to her chart and document pre-op clearance was faxed to [phone].

## 2023-10-16 NOTE — TELEPHONE ENCOUNTER
Brock Oviedo was called to assist in scheduling CT chest that was ordered by Dr. Joan Banda. Per Dr. Joan Banda: Elis aSnchez ordered a CT Chest to be performed around Nov 15th for follow up of abnormal CT Chest done 9/29/2023. Please call Brock Oviedo and help her get CT scheduled. That is my ICU week but I can come over and see her once I know when CT is scheduled for. \"    Appt for CT was made for Thurs. , 11/16/2023 at 2 PM; patient to arrive at 1:30 PM.  Appt for OV with Dr. Joan Banda scheduled for 3 pm on the same day. Brock Oviedo was informed of future appts, date and time was acceptable to her.

## 2023-10-16 NOTE — PROGRESS NOTES
I ordered a CT Chest to be performed around Nov 15th for follow up of abnormal CT Chest done 9/29/2023. Please call Jamar Nava and help her get CT scheduled. That is my ICU week but I can come over and see her once I know when CT is scheduled for.

## 2023-10-17 LAB
ACID FAST STN SPEC QL: NORMAL
MYCOBACTERIUM SPEC CULT: NORMAL

## 2023-10-18 LAB — FINAL REPORT: NORMAL

## 2023-10-23 LAB
FUNGUS SPEC CULT: NORMAL
LOEFFLER MB STN SPEC: NORMAL

## 2023-10-24 LAB
ACID FAST STN SPEC QL: NORMAL
MYCOBACTERIUM SPEC CULT: NORMAL

## 2023-10-30 LAB
FUNGUS SPEC CULT: NORMAL
LOEFFLER MB STN SPEC: NORMAL

## 2023-10-31 LAB
ACID FAST STN SPEC QL: NORMAL
MYCOBACTERIUM SPEC CULT: NORMAL

## 2023-11-06 LAB
FUNGUS SPEC CULT: NORMAL
LOEFFLER MB STN SPEC: NORMAL

## 2023-11-07 LAB
ACID FAST STN SPEC QL: NORMAL
MYCOBACTERIUM SPEC CULT: NORMAL

## 2023-11-08 DIAGNOSIS — C90.00 MULTIPLE MYELOMA, REMISSION STATUS UNSPECIFIED (HCC): ICD-10-CM

## 2023-11-08 LAB
BASOPHILS # BLD: 0 K/UL (ref 0–0.2)
BASOPHILS NFR BLD: 0.5 %
DEPRECATED RDW RBC AUTO: 13.8 % (ref 12.4–15.4)
EOSINOPHIL # BLD: 0.2 K/UL (ref 0–0.6)
EOSINOPHIL NFR BLD: 3.1 %
HCT VFR BLD AUTO: 35.7 % (ref 36–48)
HGB BLD-MCNC: 12 G/DL (ref 12–16)
LYMPHOCYTES # BLD: 2.2 K/UL (ref 1–5.1)
LYMPHOCYTES NFR BLD: 30.1 %
MCH RBC QN AUTO: 29.6 PG (ref 26–34)
MCHC RBC AUTO-ENTMCNC: 33.5 G/DL (ref 31–36)
MCV RBC AUTO: 88.3 FL (ref 80–100)
MONOCYTES # BLD: 0.8 K/UL (ref 0–1.3)
MONOCYTES NFR BLD: 10.5 %
NEUTROPHILS # BLD: 4.1 K/UL (ref 1.7–7.7)
NEUTROPHILS NFR BLD: 55.8 %
PLATELET # BLD AUTO: 202 K/UL (ref 135–450)
PMV BLD AUTO: 9.1 FL (ref 5–10.5)
RBC # BLD AUTO: 4.04 M/UL (ref 4–5.2)
WBC # BLD AUTO: 7.4 K/UL (ref 4–11)

## 2023-11-09 LAB
ALBUMIN SERPL-MCNC: 4.1 G/DL (ref 3.4–5)
ANION GAP SERPL CALCULATED.3IONS-SCNC: 13 MMOL/L (ref 3–16)
BUN SERPL-MCNC: 30 MG/DL (ref 7–20)
CALCIUM SERPL-MCNC: 9.2 MG/DL (ref 8.3–10.6)
CHLORIDE SERPL-SCNC: 104 MMOL/L (ref 99–110)
CO2 SERPL-SCNC: 23 MMOL/L (ref 21–32)
CREAT SERPL-MCNC: 1.7 MG/DL (ref 0.6–1.2)
GFR SERPLBLD CREATININE-BSD FMLA CKD-EPI: 30 ML/MIN/{1.73_M2}
GLUCOSE SERPL-MCNC: 94 MG/DL (ref 70–99)
PHOSPHATE SERPL-MCNC: 3.5 MG/DL (ref 2.5–4.9)
POTASSIUM SERPL-SCNC: 5.1 MMOL/L (ref 3.5–5.1)
SODIUM SERPL-SCNC: 140 MMOL/L (ref 136–145)

## 2023-11-14 LAB
ACID FAST STN SPEC QL: NORMAL
MYCOBACTERIUM SPEC CULT: NORMAL

## 2023-11-16 ENCOUNTER — HOSPITAL ENCOUNTER (OUTPATIENT)
Dept: CT IMAGING | Age: 78
Discharge: HOME | End: 2023-11-16
Attending: INTERNAL MEDICINE
Payer: MEDICARE

## 2023-11-16 ENCOUNTER — OFFICE VISIT (OUTPATIENT)
Dept: PULMONOLOGY | Age: 78
End: 2023-11-16
Payer: MEDICARE

## 2023-11-16 VITALS
HEART RATE: 97 BPM | DIASTOLIC BLOOD PRESSURE: 86 MMHG | BODY MASS INDEX: 27.92 KG/M2 | SYSTOLIC BLOOD PRESSURE: 132 MMHG | HEIGHT: 58 IN | OXYGEN SATURATION: 99 % | WEIGHT: 133 LBS

## 2023-11-16 DIAGNOSIS — R06.00 DYSPNEA, UNSPECIFIED TYPE: ICD-10-CM

## 2023-11-16 DIAGNOSIS — R93.89 ABNORMAL CT OF THE CHEST: ICD-10-CM

## 2023-11-16 DIAGNOSIS — R93.89 ABNORMAL CT OF THE CHEST: Primary | ICD-10-CM

## 2023-11-16 DIAGNOSIS — J45.20 MILD INTERMITTENT ASTHMA WITHOUT COMPLICATION: ICD-10-CM

## 2023-11-16 PROCEDURE — 71250 CT THORAX DX C-: CPT

## 2023-11-16 PROCEDURE — 99214 OFFICE O/P EST MOD 30 MIN: CPT | Performed by: INTERNAL MEDICINE

## 2023-11-16 PROCEDURE — 1123F ACP DISCUSS/DSCN MKR DOCD: CPT | Performed by: INTERNAL MEDICINE

## 2023-11-16 NOTE — PROGRESS NOTES
[facility] Pulmonary and Critical Care    Outpatient Follow Up Note    Subjective:   CHIEF COMPLAINT / HPI:     The patient is 66 y.o. female who is here for follow-up of abnormal CT chest with multifocal airspace disease. I performed bronchoscopy October 6 with BAL of left upper lobe that was negative for infection. I did give her empiric treatment with Levaquin. She had a CT chest earlier today. Overall she feels well and offers no complaints other than the surface of her chest feeling sore at times over the last 1 to 2 days      10/4/2023  Heather Been presents today for a new patient visit for evaluation of an abnormal CT Chest with bilateral subpleural GGO/consolidation. 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 She is a lifelong nonsmoker and has no known chronic lung disease. She does have multiple myeloma on Daratumumab monthly. She states hse had her treatment ~ 2 weeks ago and after that did feel some fatigue and myalgias which she attributed to her treatment. It was hort lived and she had no and does not have now any fevers, chills, night sweats, dyspnea, anorexia, weight loss, cough, CP or wheezing.      Past Medical History:    Past Medical History:   Diagnosis Date    THADDEUS (acute kidney injury) ([address]) 7/2/2022    Anxiety 8/4/2022    Bilateral carotid artery stenosis 4/14/2022    Bilateral carotid artery stenosis 4/14/2022    Encounter for cervical Pap smear with pelvic exam *Oct. 11,2019    Negative     Hyperlipidemia     Multiple myeloma ([address]) 7/6/2022    Osteoporosis DEXA - Sept., 2011    Lumbar T score -2.5 and frm neck -1.8    Screening mammogram for high-risk patient November 10, 2011    Negative    Screening mammogram, encounter for *March 8, 2017    Negative    Screening mammogram, encounter for *March 14, 2018    Negative    Screening mammogram, encounter for *March 20, 2019    Negative    Screening mammogram,

## 2023-11-21 ENCOUNTER — HOSPITAL ENCOUNTER (OUTPATIENT)
Dept: PULMONOLOGY | Age: 78
Discharge: HOME | End: 2023-11-21
Payer: MEDICARE

## 2023-11-21 LAB
ACID FAST STN SPEC QL: NORMAL
MYCOBACTERIUM SPEC CULT: NORMAL

## 2023-11-21 PROCEDURE — 94726 PLETHYSMOGRAPHY LUNG VOLUMES: CPT

## 2023-11-21 PROCEDURE — 6360000002 HC RX W HCPCS: Performed by: INTERNAL MEDICINE

## 2023-11-21 PROCEDURE — 94060 EVALUATION OF WHEEZING: CPT

## 2023-11-21 PROCEDURE — 94729 DIFFUSING CAPACITY: CPT

## 2023-11-21 PROCEDURE — 94760 N-INVAS EAR/PLS OXIMETRY 1: CPT

## 2023-11-21 PROCEDURE — 94664 DEMO&/EVAL PT USE INHALER: CPT

## 2023-11-21 RX ORDER — ALBUTEROL SULFATE 2.5 MG/3ML
2.5 SOLUTION RESPIRATORY (INHALATION) ONCE
Status: COMPLETED | OUTPATIENT
Start: 2023-11-21 | End: 2023-11-21

## 2023-11-21 RX ADMIN — ALBUTEROL SULFATE 2.5 MG: 2.5 SOLUTION RESPIRATORY (INHALATION) at 15:41

## 2023-11-27 PROBLEM — R93.89 ABNORMAL CT OF THE CHEST: Status: ACTIVE | Noted: 2023-11-27

## 2023-11-27 PROBLEM — J45.20 MILD INTERMITTENT ASTHMA WITHOUT COMPLICATION: Status: ACTIVE | Noted: 2023-11-27

## 2023-11-27 PROBLEM — R06.00 DYSPNEA: Status: ACTIVE | Noted: 2023-11-27

## 2024-01-24 LAB
ESTIMATED AVERAGE GLUCOSE: NORMAL
HBA1C MFR BLD: 6.6 %

## 2024-01-30 ENCOUNTER — HOSPITAL ENCOUNTER (OUTPATIENT)
Dept: CT IMAGING | Age: 79
Discharge: HOME | End: 2024-01-30
Payer: MEDICARE

## 2024-01-30 DIAGNOSIS — C34.11 MALIGNANT NEOPLASM OF RIGHT UPPER LOBE OF LUNG (HCC): ICD-10-CM

## 2024-01-30 DIAGNOSIS — C90.00 MULTIPLE MYELOMA, REMISSION STATUS UNSPECIFIED (HCC): ICD-10-CM

## 2024-01-30 DIAGNOSIS — R91.8 ABNORMAL CT LUNG SCREENING: ICD-10-CM

## 2024-01-30 PROCEDURE — 71250 CT THORAX DX C-: CPT

## 2024-01-31 ENCOUNTER — OFFICE VISIT (OUTPATIENT)
Dept: FAMILY MEDICINE CLINIC | Age: 79
End: 2024-01-31
Payer: MEDICARE

## 2024-01-31 VITALS
SYSTOLIC BLOOD PRESSURE: 120 MMHG | DIASTOLIC BLOOD PRESSURE: 84 MMHG | OXYGEN SATURATION: 98 % | HEART RATE: 110 BPM | TEMPERATURE: 96.9 F | BODY MASS INDEX: 27.8 KG/M2 | WEIGHT: 133 LBS

## 2024-01-31 DIAGNOSIS — N18.4 ACUTE RENAL FAILURE SUPERIMPOSED ON STAGE 4 CHRONIC KIDNEY DISEASE, UNSPECIFIED ACUTE RENAL FAILURE TYPE (HCC): ICD-10-CM

## 2024-01-31 DIAGNOSIS — I77.71 DISSECTION OF CAROTID ARTERY (HCC): ICD-10-CM

## 2024-01-31 DIAGNOSIS — C90.00 MULTIPLE MYELOMA, REMISSION STATUS UNSPECIFIED (HCC): ICD-10-CM

## 2024-01-31 DIAGNOSIS — G47.9 SLEEP DISTURBANCE: ICD-10-CM

## 2024-01-31 DIAGNOSIS — E11.65 TYPE 2 DIABETES MELLITUS WITH HYPERGLYCEMIA, WITHOUT LONG-TERM CURRENT USE OF INSULIN (HCC): Primary | ICD-10-CM

## 2024-01-31 DIAGNOSIS — F41.9 ANXIETY: ICD-10-CM

## 2024-01-31 DIAGNOSIS — C34.91 PRIMARY ADENOCARCINOMA OF RIGHT LUNG (HCC): ICD-10-CM

## 2024-01-31 DIAGNOSIS — N17.9 ACUTE RENAL FAILURE SUPERIMPOSED ON STAGE 4 CHRONIC KIDNEY DISEASE, UNSPECIFIED ACUTE RENAL FAILURE TYPE (HCC): ICD-10-CM

## 2024-01-31 DIAGNOSIS — H47.012 NON-ARTERITIC ANTERIOR ISCHEMIC OPTIC NEUROPATHY OF LEFT EYE: ICD-10-CM

## 2024-01-31 PROCEDURE — 1123F ACP DISCUSS/DSCN MKR DOCD: CPT | Performed by: NURSE PRACTITIONER

## 2024-01-31 PROCEDURE — 99215 OFFICE O/P EST HI 40 MIN: CPT | Performed by: NURSE PRACTITIONER

## 2024-01-31 PROCEDURE — 3044F HG A1C LEVEL LT 7.0%: CPT | Performed by: NURSE PRACTITIONER

## 2024-01-31 RX ORDER — BLOOD-GLUCOSE METER
1 KIT MISCELLANEOUS DAILY
Qty: 100 EACH | Refills: 3 | Status: SHIPPED | OUTPATIENT
Start: 2024-01-31

## 2024-01-31 RX ORDER — BLOOD-GLUCOSE METER
1 KIT MISCELLANEOUS DAILY
Qty: 1 KIT | Refills: 0 | Status: SHIPPED | OUTPATIENT
Start: 2024-01-31

## 2024-01-31 RX ORDER — LANCETS 28 GAUGE
1 EACH MISCELLANEOUS DAILY
Qty: 100 EACH | Refills: 3 | Status: SHIPPED | OUTPATIENT
Start: 2024-01-31

## 2024-01-31 RX ORDER — LORAZEPAM 0.5 MG/1
TABLET ORAL
Qty: 60 TABLET | Refills: 4 | Status: SHIPPED | OUTPATIENT
Start: 2024-01-31 | End: 2024-03-01

## 2024-01-31 ASSESSMENT — ENCOUNTER SYMPTOMS
VOMITING: 0
WHEEZING: 0
ABDOMINAL PAIN: 0
SORE THROAT: 0
DIARRHEA: 0
CHEST TIGHTNESS: 0
BACK PAIN: 0
BLOOD IN STOOL: 0
RHINORRHEA: 0
SHORTNESS OF BREATH: 0
COLOR CHANGE: 0
NAUSEA: 0
COUGH: 0
CONSTIPATION: 0
EYE REDNESS: 0
SINUS PRESSURE: 0
EYE ITCHING: 0

## 2024-01-31 ASSESSMENT — PATIENT HEALTH QUESTIONNAIRE - PHQ9
SUM OF ALL RESPONSES TO PHQ QUESTIONS 1-9: 0
1. LITTLE INTEREST OR PLEASURE IN DOING THINGS: 0
SUM OF ALL RESPONSES TO PHQ QUESTIONS 1-9: 0
2. FEELING DOWN, DEPRESSED OR HOPELESS: 0
SUM OF ALL RESPONSES TO PHQ QUESTIONS 1-9: 0
SUM OF ALL RESPONSES TO PHQ9 QUESTIONS 1 & 2: 0
SUM OF ALL RESPONSES TO PHQ QUESTIONS 1-9: 0

## 2024-01-31 NOTE — PROGRESS NOTES
Carina Angelo (:  1945) is a 78 y.o. female,Established patient, here for evaluation of the following chief complaint(s):  Eye Problem and Sleep Problem      ASSESSMENT/PLAN:  1. Type 2 diabetes mellitus with hyperglycemia, without long-term current use of insulin ([facility])  Assessment & Plan:  This is a new diagnosis for the patient.  Her daughter has some knowledge of this that she was diagnosed as a gestational diabetic so she is aware of how to check glucose levels.  Discussed options for monitoring blood sugar via traditional fingersticks versus yohannes or Dexcom sensors.  Daughter feels that the Dexcom or yohannes sensor would be too complicated for her mother and fingersticks would be the best option.  Freestyle glucose monitor was sent to the pharmacy for monitoring of glucose.  At this time they will monitor and make nutritional adjustments without the addition of medication and plan for repeat labs in about 2 months.  Orders:  -     [facility] [place] [facility]  2. Multiple myeloma, remission status unspecified ([facility])  Assessment & Plan:   Monitored by specialist- no acute findings meriting change in the plan  3. Dissection of carotid artery ([facility])  Assessment & Plan:   Monitored by specialist- no acute findings meriting change in the plan  4. Acute renal failure superimposed on stage 4 chronic kidney disease, unspecified acute renal failure type ([facility])  Assessment & Plan:   Monitored by specialist- no acute findings meriting change in the plan  5. Sleep disturbance  -     Gustavo Smith MD, [facility] [place]  6. Anxiety  Assessment & Plan:   Anxiety has been slightly worse since this has been occurring.  She continues to take her Xanax sparingly but does need a refill at this time.  PDMP Monitoring:    Last PDMP Sherif as Reviewed:  Review User Review Instant Review Result   MARILU DANGELO 2024 10:19 AM Reviewed PDMP [1]     Last

## 2024-01-31 NOTE — ASSESSMENT & PLAN NOTE
This is a new diagnosis for the patient.  Her daughter has some knowledge of this that she was diagnosed as a gestational diabetic so she is aware of how to check glucose levels.  Discussed options for monitoring blood sugar via traditional fingersticks versus yohannes or Dexcom sensors.  Daughter feels that the Dexcom or yohannes sensor would be too complicated for her mother and fingersticks would be the best option.  Freestyle glucose monitor was sent to the pharmacy for monitoring of glucose.  At this time they will monitor and make nutritional adjustments without the addition of medication and plan for repeat labs in about 2 months.

## 2024-01-31 NOTE — ASSESSMENT & PLAN NOTE
Emergency room evaluation reviewed in care everywhere.  Discussed risk factors to get better control of to decrease inflammation including glucose and blood pressure control.  Will refer to a dietitian to get her blood sugars under better control with nutritional changes and continue to track blood pressures.

## 2024-01-31 NOTE — ASSESSMENT & PLAN NOTE
Anxiety has been slightly worse since this has been occurring.  She continues to take her Xanax sparingly but does need a refill at this time.  PDMP Monitoring:    Last PDMP Sherif as Reviewed:  Review User Review Instant Review Result   MARILU DANGELO 1/31/2024 10:19 AM Reviewed PDMP [1]     Last Controlled Substance Monitoring Documentation      Flowsheet Row Office Visit from 6/28/2023 in [facility]   Periodic Controlled Substance Monitoring Possible medication side effects, risk of tolerance/dependence & alternative treatments discussed., No signs of potential drug abuse or diversion identified., Assessed functional status. filed at 06/28/2023 1452          Urine Drug Screenings (1 yr)       Drug Panel-PM-HI Res-UR Interp-A  Collected: 7/30/2015 10:34 PM (Final result)                  Medication Contract and Consent for Opioid Use Documents Filed       Patient Documents       Type of Document Status Date Received Received By Description    Medication Contract [Status Missing]  JAMEY MAGANA medication agreement

## 2024-02-12 ENCOUNTER — TELEPHONE (OUTPATIENT)
Dept: FAMILY MEDICINE CLINIC | Age: 79
End: 2024-02-12

## 2024-02-12 DIAGNOSIS — E11.65 TYPE 2 DIABETES MELLITUS WITH HYPERGLYCEMIA, WITHOUT LONG-TERM CURRENT USE OF INSULIN (HCC): Primary | ICD-10-CM

## 2024-02-12 NOTE — TELEPHONE ENCOUNTER
Gricel called back to advised that [facility] at [phone] no longer offer services.. can Mallory recommend someone else. Romy number is [phone] (

## 2024-02-15 ENCOUNTER — OFFICE VISIT (OUTPATIENT)
Dept: ENDOCRINOLOGY | Age: 79
End: 2024-02-15

## 2024-02-15 DIAGNOSIS — E11.65 TYPE 2 DIABETES MELLITUS WITH HYPERGLYCEMIA, WITHOUT LONG-TERM CURRENT USE OF INSULIN (HCC): Primary | ICD-10-CM

## 2024-02-15 NOTE — PROGRESS NOTES
Medical Nutrition Therapy for Diabetes  [facility] Endocrinology    Carina Angelo  February 15, 2024    Patient Care Team:  Jacky Moss MD as PCP - General (Family Medicine)  Jacky Moss MD as PCP - Empaneled Provider  Earl Hamilton MD as Consulting Physician (Obstetrics & Gynecology)  William Simental MD as Consulting Physician (Otolaryngology)  Jacky Moss MD as Consulting Physician (Family Medicine)    Reason for visit: 1. Type 2 diabetes mellitus with hyperglycemia, without long-term current use of insulin (HCC)     Initial     ASSESSMENT/PLAN:   NUTRITION DIAGNOSIS    #1 Problem: Altered Nutrition-Related Laboratory Values (NC-2.2)  Related to: Endocrine/Diabetes   As Evidenced by: Elevated Plasma glucose and/or HgbA1c levels         #2 Problem: Inconsistent Carbohydrate Intake (NI 5.8.4)  Related to: Varied meal timing / incorrect carbohydrate counting  As Evidenced by: fluctuation in blood glucose levels / food recall    #3 Problem: Inadequate Fluid Intake (NI-3.1)  Related to: decreased thirst sensation/inability to access fluid independently  As evidences by: Fluid consumption reported under 64 oz per day.    NUTRITION INTERVENTION  Nutrition Prescription: 45 grams carbohydrate per meal with protein and non-starch vegetables  15 gram carbohydrate snacks     Diabetes Education/Counseling included:  Carbohydrate control-simple vs complex carbohydrates  Label reading  Medication Review  Explained small efforts can promote improved health results  Benefit of eating protein with each meal/snack reviewed  Reviewed impact of caffeine and carbonation on urination frequency  Explained HgbA1c ranges, reviewed normal/at risk for diabetes/and diabetes diagnosis ranges.  Reviewed glucometer use with demo monitor. Provided chart to write bg levels on.     Handouts Provided:  Checking Your Blood Glucose- NovoNordisk  [facility] Diabetes and Education Handouts- Alina

## 2024-02-19 RX ORDER — ATORVASTATIN CALCIUM 20 MG/1
TABLET, FILM COATED ORAL
Qty: 90 TABLET | Refills: 3 | Status: SHIPPED | OUTPATIENT
Start: 2024-02-19

## 2024-02-26 ENCOUNTER — OFFICE VISIT (OUTPATIENT)
Dept: FAMILY MEDICINE CLINIC | Age: 79
End: 2024-02-26
Payer: MEDICARE

## 2024-02-26 VITALS
HEART RATE: 97 BPM | BODY MASS INDEX: 27.93 KG/M2 | SYSTOLIC BLOOD PRESSURE: 100 MMHG | OXYGEN SATURATION: 97 % | DIASTOLIC BLOOD PRESSURE: 68 MMHG | WEIGHT: 133.6 LBS

## 2024-02-26 DIAGNOSIS — J06.9 VIRAL URI: Primary | ICD-10-CM

## 2024-02-26 PROCEDURE — 99213 OFFICE O/P EST LOW 20 MIN: CPT | Performed by: STUDENT IN AN ORGANIZED HEALTH CARE EDUCATION/TRAINING PROGRAM

## 2024-02-26 PROCEDURE — 1123F ACP DISCUSS/DSCN MKR DOCD: CPT | Performed by: STUDENT IN AN ORGANIZED HEALTH CARE EDUCATION/TRAINING PROGRAM

## 2024-02-26 RX ORDER — ACETAMINOPHEN 500 MG
500 TABLET ORAL 4 TIMES DAILY PRN
Qty: 120 TABLET | Refills: 5 | Status: SHIPPED | OUTPATIENT
Start: 2024-02-26

## 2024-02-26 RX ORDER — GUAIFENESIN/DEXTROMETHORPHAN 100-10MG/5
5 SYRUP ORAL 3 TIMES DAILY PRN
Qty: 120 ML | Refills: 3 | Status: SHIPPED | OUTPATIENT
Start: 2024-02-26 | End: 2024-03-29

## 2024-02-26 RX ORDER — LORATADINE 10 MG/1
10 TABLET ORAL DAILY
Qty: 30 TABLET | Refills: 0 | Status: SHIPPED | OUTPATIENT
Start: 2024-02-26 | End: 2024-03-27

## 2024-02-26 NOTE — PROGRESS NOTES
bl  HEENT: MMM  Cardio: S1, S2 heard, RRR, no murmurs appreciated  Resp: No acte respiratory distress, symmetric chest expansion,  CTAB  Abdomen: Soft, non-tender to palpation, non-distended   Neuro: Grossly intact, no focal deficits  MSK: Moves all extremities spontaneously, no acute joint swelling  Skin: Warm and dry, no acute lesions  Psych: Calm, able to answer questions and follow commands       1. Viral URI  -Signs and symptoms consistent with viral URI, no red flag symptoms, no acute findings on physical exam, however because patient is high risk will obtain chest x-ray to rule out signs of pneumonia, is not currently on any medications for URI, will symptomatically treat with Robitussin, loratadine, Tylenol, return precautions reviewed with patient, advised to continue supportive therapy  - guaiFENesin-dextromethorphan (ROBITUSSIN DM) 100-10 MG/5ML syrup; Take 5 mLs by mouth 3 times daily as needed for Cough  Dispense: 120 mL; Refill: 3  - loratadine (CLARITIN) 10 MG tablet; Take 1 tablet by mouth daily  Dispense: 30 tablet; Refill: 0  - acetaminophen (TYLENOL) 500 MG tablet; Take 1 tablet by mouth 4 times daily as needed for Pain  Dispense: 120 tablet; Refill: 5  - XR CHEST STANDARD (2 VW); Future       Return in about 4 weeks (around 3/25/2024) for Follow up .       This note was at least partially created using voice recognition software and may contain speech and/or medical errors.  For any questions please contact me directly  Mike Husain MD

## 2024-02-27 ENCOUNTER — HOSPITAL ENCOUNTER (OUTPATIENT)
Dept: GENERAL RADIOLOGY | Age: 79
Discharge: HOME | End: 2024-02-27
Payer: MEDICARE

## 2024-02-27 DIAGNOSIS — J06.9 VIRAL URI: ICD-10-CM

## 2024-02-27 PROCEDURE — 71046 X-RAY EXAM CHEST 2 VIEWS: CPT

## 2024-02-29 ENCOUNTER — TELEPHONE (OUTPATIENT)
Dept: FAMILY MEDICINE CLINIC | Age: 79
End: 2024-02-29

## 2024-02-29 DIAGNOSIS — R05.9 COUGH, UNSPECIFIED TYPE: Primary | ICD-10-CM

## 2024-02-29 RX ORDER — AZITHROMYCIN 500 MG/1
500 TABLET, FILM COATED ORAL DAILY
Qty: 3 TABLET | Refills: 0 | Status: SHIPPED | OUTPATIENT
Start: 2024-02-29 | End: 2024-03-03

## 2024-02-29 NOTE — TELEPHONE ENCOUNTER
Pt saw  2/26/2024 for a URI, was prescribed Claritin and robitussin dm. Pt's cough is not improving, please advise.

## 2024-03-08 DIAGNOSIS — N17.9 AKI (ACUTE KIDNEY INJURY) (HCC): ICD-10-CM

## 2024-03-08 DIAGNOSIS — C90.00 MULTIPLE MYELOMA, REMISSION STATUS UNSPECIFIED (HCC): ICD-10-CM

## 2024-03-08 LAB
ALBUMIN SERPL-MCNC: 4.1 G/DL (ref 3.4–5)
ANION GAP SERPL CALCULATED.3IONS-SCNC: 12 MMOL/L (ref 3–16)
BUN SERPL-MCNC: 35 MG/DL (ref 7–20)
CALCIUM SERPL-MCNC: 9.9 MG/DL (ref 8.3–10.6)
CHLORIDE SERPL-SCNC: 103 MMOL/L (ref 99–110)
CO2 SERPL-SCNC: 25 MMOL/L (ref 21–32)
CREAT SERPL-MCNC: 2.1 MG/DL (ref 0.6–1.2)
CREAT UR-MCNC: 77.7 MG/DL (ref 28–259)
GFR SERPLBLD CREATININE-BSD FMLA CKD-EPI: 24 ML/MIN/{1.73_M2}
GLUCOSE SERPL-MCNC: 99 MG/DL (ref 70–99)
MICROALBUMIN UR DL<=1MG/L-MCNC: <1.2 MG/DL
MICROALBUMIN/CREAT UR: NORMAL MG/G (ref 0–30)
PHOSPHATE SERPL-MCNC: 4 MG/DL (ref 2.5–4.9)
POTASSIUM SERPL-SCNC: 4.8 MMOL/L (ref 3.5–5.1)
SODIUM SERPL-SCNC: 140 MMOL/L (ref 136–145)

## 2024-03-18 ENCOUNTER — OFFICE VISIT (OUTPATIENT)
Dept: FAMILY MEDICINE CLINIC | Age: 79
End: 2024-03-18
Payer: MEDICARE

## 2024-03-18 VITALS
WEIGHT: 129.2 LBS | DIASTOLIC BLOOD PRESSURE: 80 MMHG | HEART RATE: 89 BPM | OXYGEN SATURATION: 98 % | SYSTOLIC BLOOD PRESSURE: 124 MMHG | HEIGHT: 58 IN | BODY MASS INDEX: 27.12 KG/M2

## 2024-03-18 DIAGNOSIS — H47.012 NON-ARTERITIC ANTERIOR ISCHEMIC OPTIC NEUROPATHY OF LEFT EYE: ICD-10-CM

## 2024-03-18 DIAGNOSIS — E78.5 HYPERLIPIDEMIA, UNSPECIFIED HYPERLIPIDEMIA TYPE: ICD-10-CM

## 2024-03-18 DIAGNOSIS — R35.0 URINARY FREQUENCY: ICD-10-CM

## 2024-03-18 DIAGNOSIS — I77.71 DISSECTION OF CAROTID ARTERY (HCC): ICD-10-CM

## 2024-03-18 DIAGNOSIS — E11.65 TYPE 2 DIABETES MELLITUS WITH HYPERGLYCEMIA, WITHOUT LONG-TERM CURRENT USE OF INSULIN (HCC): ICD-10-CM

## 2024-03-18 DIAGNOSIS — N17.9 ACUTE RENAL FAILURE SUPERIMPOSED ON STAGE 4 CHRONIC KIDNEY DISEASE, UNSPECIFIED ACUTE RENAL FAILURE TYPE (HCC): ICD-10-CM

## 2024-03-18 DIAGNOSIS — C90.00 MULTIPLE MYELOMA, REMISSION STATUS UNSPECIFIED (HCC): ICD-10-CM

## 2024-03-18 DIAGNOSIS — C34.91 PRIMARY ADENOCARCINOMA OF RIGHT LUNG (HCC): ICD-10-CM

## 2024-03-18 DIAGNOSIS — Z00.00 WELL ADULT EXAM: Primary | ICD-10-CM

## 2024-03-18 DIAGNOSIS — H54.61 VISUAL LOSS, RIGHT EYE: ICD-10-CM

## 2024-03-18 DIAGNOSIS — F41.9 ANXIETY: ICD-10-CM

## 2024-03-18 DIAGNOSIS — N18.4 ACUTE RENAL FAILURE SUPERIMPOSED ON STAGE 4 CHRONIC KIDNEY DISEASE, UNSPECIFIED ACUTE RENAL FAILURE TYPE (HCC): ICD-10-CM

## 2024-03-18 PROCEDURE — G0439 PPPS, SUBSEQ VISIT: HCPCS | Performed by: FAMILY MEDICINE

## 2024-03-18 PROCEDURE — 3044F HG A1C LEVEL LT 7.0%: CPT | Performed by: FAMILY MEDICINE

## 2024-03-18 PROCEDURE — 1123F ACP DISCUSS/DSCN MKR DOCD: CPT | Performed by: FAMILY MEDICINE

## 2024-03-18 ASSESSMENT — LIFESTYLE VARIABLES
HOW MANY STANDARD DRINKS CONTAINING ALCOHOL DO YOU HAVE ON A TYPICAL DAY: PATIENT DOES NOT DRINK
HOW OFTEN DO YOU HAVE A DRINK CONTAINING ALCOHOL: NEVER

## 2024-03-18 ASSESSMENT — PATIENT HEALTH QUESTIONNAIRE - PHQ9
SUM OF ALL RESPONSES TO PHQ QUESTIONS 1-9: 0
SUM OF ALL RESPONSES TO PHQ QUESTIONS 1-9: 0
SUM OF ALL RESPONSES TO PHQ9 QUESTIONS 1 & 2: 0
SUM OF ALL RESPONSES TO PHQ QUESTIONS 1-9: 0
1. LITTLE INTEREST OR PLEASURE IN DOING THINGS: NOT AT ALL
SUM OF ALL RESPONSES TO PHQ QUESTIONS 1-9: 0
2. FEELING DOWN, DEPRESSED OR HOPELESS: NOT AT ALL

## 2024-03-18 NOTE — ASSESSMENT & PLAN NOTE
Uncontrolled, continue current plan [facility]

## 2024-03-18 NOTE — ASSESSMENT & PLAN NOTE
Well-controlled, continue current medications  Controlled Substance Monitoring:    Acute and Chronic Pain Monitoring:   RX Monitoring Periodic Controlled Substance Monitoring Chronic Pain > 50 MEDD   3/18/2024  12:55 PM Possible medication side effects, risk of tolerance/dependence & alternative treatments discussed.;No signs of potential drug abuse or diversion identified.;Assessed functional status (ability to engage in work or other purposeful activities, the pain intensity and its interference with activities of daily living, quality of family life and social activities, and the physical activity);Obtaining appropriate analgesic effect of treatment. Obtained or confirmed \"Consent for Opioid Use\" on file.

## 2024-03-18 NOTE — PROGRESS NOTES
or confirmed \"Consent for Opioid Use\" on file.           Recommendations for Preventive Services Due: see orders and patient instructions/AVS.  Recommended screening schedule for the next 5-10 years is provided to the patient in written form: see Patient Instructions/AVS.     No follow-ups on file.     Subjective   The following acute and/or chronic problems were also addressed today:  Type 2 diabetes mellitus with hyperglycemia, without long-term current use of insulin (HCC)   Unclear control, changes made today: labs--this is likely related to freq use of steroids for chemo(monthly)    Primary adenocarcinoma of right lung (HCC)   Monitored by specialist- no acute findings meriting change in the plan    Acute renal failure superimposed on stage 4 chronic kidney disease (HCC)    improved    Dissection of carotid artery (HCC)   Monitored by specialist- no acute findings meriting change in the plan    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   Uncontrolled, changes made today: now in R eye    Multiple myeloma (HCC)   Monitored by specialist- no acute findings meriting change in the plan    Visual loss, right eye   Uncontrolled, continue current plan pending work up [facility] soon    Hyperlipidemia   Well-controlled, continue current medications    Anxiety   Well-controlled, continue current medications  Controlled Substance Monitoring:    Acute and Chronic Pain Monitoring:   RX Monitoring Periodic Controlled Substance Monitoring Chronic Pain > 50 MEDD   3/18/2024  12:55 PM Possible medication side effects, risk of tolerance/dependence & alternative treatments discussed.;No signs of potential drug abuse or diversion identified.;Assessed functional status (ability to engage in work or other purposeful activities, the pain intensity and its interference with activities of daily living, quality of family life and social activities, and the physical activity);Obtaining appropriate analgesic

## 2024-03-18 NOTE — ASSESSMENT & PLAN NOTE
Unclear control, changes made today: labs--this is likely related to freq use of steroids for chemo(monthly)

## 2024-03-19 ENCOUNTER — OFFICE VISIT (OUTPATIENT)
Dept: PULMONOLOGY | Age: 79
End: 2024-03-19
Payer: MEDICARE

## 2024-03-19 VITALS
HEART RATE: 99 BPM | RESPIRATION RATE: 16 BRPM | OXYGEN SATURATION: 99 % | WEIGHT: 128 LBS | SYSTOLIC BLOOD PRESSURE: 118 MMHG | BODY MASS INDEX: 26.87 KG/M2 | HEIGHT: 58 IN | DIASTOLIC BLOOD PRESSURE: 80 MMHG

## 2024-03-19 DIAGNOSIS — E11.65 TYPE 2 DIABETES MELLITUS WITH HYPERGLYCEMIA, WITHOUT LONG-TERM CURRENT USE OF INSULIN (HCC): ICD-10-CM

## 2024-03-19 DIAGNOSIS — C34.91 PRIMARY ADENOCARCINOMA OF RIGHT LUNG (HCC): Primary | ICD-10-CM

## 2024-03-19 DIAGNOSIS — C90.00 MULTIPLE MYELOMA, REMISSION STATUS UNSPECIFIED (HCC): ICD-10-CM

## 2024-03-19 DIAGNOSIS — E78.5 HYPERLIPIDEMIA, UNSPECIFIED HYPERLIPIDEMIA TYPE: ICD-10-CM

## 2024-03-19 LAB
ALBUMIN SERPL-MCNC: 4.4 G/DL (ref 3.4–5)
ALBUMIN/GLOB SERPL: 2.4 {RATIO} (ref 1.1–2.2)
ALP SERPL-CCNC: 62 U/L (ref 40–129)
ALT SERPL-CCNC: 15 U/L (ref 10–40)
ANION GAP SERPL CALCULATED.3IONS-SCNC: 11 MMOL/L (ref 3–16)
AST SERPL-CCNC: 19 U/L (ref 15–37)
BACTERIA URNS QL MICRO: NORMAL /HPF
BASOPHILS # BLD: 0.1 K/UL (ref 0–0.2)
BASOPHILS NFR BLD: 0.9 %
BILIRUB SERPL-MCNC: 0.3 MG/DL (ref 0–1)
BILIRUB UR QL STRIP.AUTO: NEGATIVE
BUN SERPL-MCNC: 36 MG/DL (ref 7–20)
CALCIUM SERPL-MCNC: 9.9 MG/DL (ref 8.3–10.6)
CHLORIDE SERPL-SCNC: 104 MMOL/L (ref 99–110)
CHOLEST SERPL-MCNC: 225 MG/DL (ref 0–199)
CLARITY UR: CLEAR
CO2 SERPL-SCNC: 25 MMOL/L (ref 21–32)
COLOR UR: YELLOW
CREAT SERPL-MCNC: 1.8 MG/DL (ref 0.6–1.2)
DEPRECATED RDW RBC AUTO: 15.2 % (ref 12.4–15.4)
EOSINOPHIL # BLD: 0.3 K/UL (ref 0–0.6)
EOSINOPHIL NFR BLD: 3.8 %
EPI CELLS #/AREA URNS AUTO: 2 /HPF (ref 0–5)
GFR SERPLBLD CREATININE-BSD FMLA CKD-EPI: 28 ML/MIN/{1.73_M2}
GLUCOSE SERPL-MCNC: 109 MG/DL (ref 70–99)
GLUCOSE UR STRIP.AUTO-MCNC: NEGATIVE MG/DL
HCT VFR BLD AUTO: 40 % (ref 36–48)
HDLC SERPL-MCNC: 58 MG/DL (ref 40–60)
HGB BLD-MCNC: 13.3 G/DL (ref 12–16)
HGB UR QL STRIP.AUTO: NEGATIVE
HYALINE CASTS #/AREA URNS AUTO: 0 /LPF (ref 0–8)
KETONES UR STRIP.AUTO-MCNC: NEGATIVE MG/DL
LDLC SERPL CALC-MCNC: 136 MG/DL
LEUKOCYTE ESTERASE UR QL STRIP.AUTO: ABNORMAL
LYMPHOCYTES # BLD: 3 K/UL (ref 1–5.1)
LYMPHOCYTES NFR BLD: 37.6 %
MCH RBC QN AUTO: 28.9 PG (ref 26–34)
MCHC RBC AUTO-ENTMCNC: 33.1 G/DL (ref 31–36)
MCV RBC AUTO: 87.4 FL (ref 80–100)
MONOCYTES # BLD: 0.8 K/UL (ref 0–1.3)
MONOCYTES NFR BLD: 9.9 %
NEUTROPHILS # BLD: 3.8 K/UL (ref 1.7–7.7)
NEUTROPHILS NFR BLD: 47.8 %
NITRITE UR QL STRIP.AUTO: NEGATIVE
PH UR STRIP.AUTO: 6 [PH] (ref 5–8)
PLATELET # BLD AUTO: 270 K/UL (ref 135–450)
PMV BLD AUTO: 9.1 FL (ref 5–10.5)
POTASSIUM SERPL-SCNC: 5 MMOL/L (ref 3.5–5.1)
PROT SERPL-MCNC: 6.2 G/DL (ref 6.4–8.2)
PROT UR STRIP.AUTO-MCNC: 30 MG/DL
RBC # BLD AUTO: 4.58 M/UL (ref 4–5.2)
RBC CLUMPS #/AREA URNS AUTO: 1 /HPF (ref 0–4)
SODIUM SERPL-SCNC: 140 MMOL/L (ref 136–145)
SP GR UR STRIP.AUTO: 1.02 (ref 1–1.03)
TRIGL SERPL-MCNC: 156 MG/DL (ref 0–150)
UA COMPLETE W REFLEX CULTURE PNL UR: ABNORMAL
UA DIPSTICK W REFLEX MICRO PNL UR: YES
URN SPEC COLLECT METH UR: ABNORMAL
UROBILINOGEN UR STRIP-ACNC: 0.2 E.U./DL
VLDLC SERPL CALC-MCNC: 31 MG/DL
WBC # BLD AUTO: 7.9 K/UL (ref 4–11)
WBC #/AREA URNS AUTO: 2 /HPF (ref 0–5)

## 2024-03-19 PROCEDURE — 1123F ACP DISCUSS/DSCN MKR DOCD: CPT | Performed by: INTERNAL MEDICINE

## 2024-03-19 PROCEDURE — 99213 OFFICE O/P EST LOW 20 MIN: CPT | Performed by: INTERNAL MEDICINE

## 2024-03-19 NOTE — PROGRESS NOTES
[facility] Pulmonary and Critical Care    Outpatient Follow Up Note    Subjective:   CHIEF COMPLAINT / HPI:     The patient is 78 y.o. female whom is here for follow-up of abnormal CT chest with multifocal airspace disease in the setting of Stage I adenocarcinoma s/p SBRT RUL 9/2022 and multiple myeloma. Since last visit she had a CT Chest 1/30/2024 for interval follow up of abnormalities. Her biggest complaint is of significant worsening of vision in right eye over several days end of Jan. She was told it was die to a CVA and has been seen by a specialist at [facility]. She has no respiratory complaints and denies any HAMILTON, cough, wheezing, CP, fevers, anorexia or weight loss.     11/18/2023  Carina is here for follow-up of abnormal CT chest with multifocal airspace disease.  I performed bronchoscopy October 6 with BAL of left upper lobe that was negative for infection.  I did give her empiric treatment with Levaquin.  She had a CT chest earlier today.  Overall she feels well and offers no complaints other than the surface of her chest feeling sore at times over the last 1 to 2 days      10/4/2023  Carina presents today for a new patient visit for evaluation of an abnormal CT Chest with bilateral subpleural GGO/consolidation. 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 She is a lifelong nonsmoker and has no known chronic lung disease. She does have multiple myeloma on Daratumumab monthly. She states hse had her treatment ~ 2 weeks ago and after that did feel some fatigue and myalgias which she attributed to her treatment. It was hort lived and she had no and does not have now any fevers, chills, night sweats, dyspnea, anorexia, weight loss, cough, CP or wheezing.     Past Medical History:    Past Medical History:   Diagnosis Date    THADDEUS (acute kidney injury) (HCC) 7/2/2022    Anxiety 8/4/2022    Bilateral carotid artery stenosis 4/14/2022

## 2024-03-20 LAB
EST. AVERAGE GLUCOSE BLD GHB EST-MCNC: 125.5 MG/DL
HBA1C MFR BLD: 6 %

## 2024-04-01 PROBLEM — C90.00 MULTIPLE MYELOMA (HCC): Chronic | Status: ACTIVE | Noted: 2022-07-06

## 2024-04-01 PROBLEM — F51.04 INSOMNIA, PSYCHOPHYSIOLOGICAL: Chronic | Status: ACTIVE | Noted: 2024-04-01

## 2024-04-01 PROBLEM — C34.91 PRIMARY ADENOCARCINOMA OF RIGHT LUNG (HCC): Chronic | Status: ACTIVE | Noted: 2022-11-01

## 2024-04-01 PROBLEM — E11.65 TYPE 2 DIABETES MELLITUS WITH HYPERGLYCEMIA, WITHOUT LONG-TERM CURRENT USE OF INSULIN (HCC): Chronic | Status: ACTIVE | Noted: 2024-01-31

## 2024-04-01 PROBLEM — J45.20 MILD INTERMITTENT ASTHMA WITHOUT COMPLICATION: Chronic | Status: ACTIVE | Noted: 2023-11-27

## 2024-04-01 PROBLEM — F41.9 ANXIETY: Chronic | Status: ACTIVE | Noted: 2022-08-04

## 2024-04-10 ENCOUNTER — HOSPITAL ENCOUNTER (OUTPATIENT)
Dept: SLEEP CENTER | Age: 79
Discharge: HOME | End: 2024-04-10
Payer: MEDICARE

## 2024-04-10 DIAGNOSIS — G47.10 HYPERSOMNIA: ICD-10-CM

## 2024-04-10 PROCEDURE — 95806 SLEEP STUDY UNATT&RESP EFFT: CPT

## 2024-04-16 ENCOUNTER — TELEPHONE (OUTPATIENT)
Dept: PULMONOLOGY | Age: 79
End: 2024-04-16

## 2024-04-16 NOTE — TELEPHONE ENCOUNTER
Spoke with pt's daughter to review HST. Daughter \" Lucero\" to review HST results.  Daughter to call back after speaking with pt.  Daughter was asked to have pt call with any questions.

## 2024-07-26 ENCOUNTER — HOSPITAL ENCOUNTER (OUTPATIENT)
Dept: CT IMAGING | Age: 79
Discharge: HOME | End: 2024-07-26
Payer: MEDICARE

## 2024-07-26 DIAGNOSIS — C34.11 MALIGNANT NEOPLASM OF BRONCHUS OF UPPER LOBE, RIGHT (HCC): ICD-10-CM

## 2024-07-26 PROCEDURE — 71250 CT THORAX DX C-: CPT

## 2024-07-30 ENCOUNTER — OFFICE VISIT (OUTPATIENT)
Dept: PULMONOLOGY | Age: 79
End: 2024-07-30
Payer: MEDICARE

## 2024-07-30 VITALS
WEIGHT: 130 LBS | HEIGHT: 58 IN | SYSTOLIC BLOOD PRESSURE: 142 MMHG | DIASTOLIC BLOOD PRESSURE: 70 MMHG | RESPIRATION RATE: 16 BRPM | OXYGEN SATURATION: 97 % | BODY MASS INDEX: 27.29 KG/M2 | HEART RATE: 99 BPM

## 2024-07-30 DIAGNOSIS — R93.89 ABNORMAL CT OF THE CHEST: Primary | ICD-10-CM

## 2024-07-30 PROCEDURE — 99214 OFFICE O/P EST MOD 30 MIN: CPT | Performed by: INTERNAL MEDICINE

## 2024-07-30 PROCEDURE — 1123F ACP DISCUSS/DSCN MKR DOCD: CPT | Performed by: INTERNAL MEDICINE

## 2024-07-30 NOTE — PROGRESS NOTES
Previously  described focus of abnormal parenchymal density in the left pulmonary apex has  resolved. A persistent parenchymal focus in the right pulmonary apex is  unchanged from the previous study. Mild centrilobular emphysematous changes are  noted. No evidence of acute airspace consolidation     PLEURA: No pleural effusions or pleural thickening.     HEART AND GREAT VESSELS: Cardiac size normal. Mild to moderate coronary artery  calcifications are present without significant change. No pericardial effusion  visualized. The thoracic aorta appears normal in diameter and configuration.  Mild atherosclerotic calcifications are redemonstrated.     ADENOPATHY/MEDIASTINUM: No adenopathy.     CHEST WALL / LOWER NECK: No significant abnormality.     UPPER ABDOMEN: Small hiatal hernia redemonstrated.     BONES: No significant abnormality.     IMPRESSION:     1. Interval resolution of suspicious nodular focus noted on previous scan in the  left pulmonary apex. Residual thin curvilinear scar is seen currently.  2. No significant change in right apical masslike density.  3. Mild centrilobular emphysema.    CT Chest 9/29/2023 reveals the following:    Reason: History of lung carcinoma patient presents for follow-up     CT of the chest without contrast.     TECHNIQUE: Collimated helical images are made from the thoracic inlet to the adrenal glands without intravenous contrast. Up-to-date CT equipment and radiation dose reduction techniques were employed. Consolidation identified involving the lateral aspect   of the left upper lung which is new from prior examination. This measures 2.5 x 4.3 cm. Patchy consolidation is identified within both lower lungs as well, greater in the right which measures 1.9 x 2.7 cm. These are new from prior examination. Subtle   patchy airspace is posterior aspect left upper lobe as well and suprasellar segments left lower lung.     Within the right lung apex, increased soft tissue identified

## 2024-08-14 NOTE — PROGRESS NOTES
Neulasta on-body injector: No     Drug Name: Fluorouacil  Cycle/Day: C11/D3  ECOG [08/14/24 1528]   ECOG Performance Status 1       Oral Chemotherapy: No    Nursing Assessment:  A comprehensive nursing assessment was performed and the patient reports the following:    Anxiety/Depression/Insomnia: Anxiety: No, Depression: No, and Insomnia: No  Pain: NO    Toxicity Assessment:    Auditory/Ear  Assessment: Yes (Within Defined Limits)    Cardiac General  Assessment: Yes (w/ Exceptions to WDL)  Hypertension: Grade 2    Constitutional  Assessment: Yes (w/ Exceptions to WDL)  Fatigue: Grade 1    Dermatology/Skin  Assessment: Yes (Within Defined Limits)    Endocrine  Assessment: Yes (Within Defined Limits)    Gastrointestinal  Assessment: Yes (Within Defined Limits)    Hemorrhage/Bleeding  Assessment: Yes (Within Defined Limits)    Infection  Assessment: Yes (Within Defined Limits)    Lymphatics  Assessment: Yes (Within Defined Limits)    Musculoskeletal  Assessment: Yes (Within Defined Limits)    Neurology  Assessment: Yes (w/ Exceptions to WDL)  Paresthesia: Grade 1    Ocular  Assessment: Yes (Within Defined Limits)    Pain  Assessment: Yes (Within Defined Limits)    Pulmonary/Upper Respiratory  Assessment: Yes (Within Defined Limits)    Genitourinary  Assessment: Yes (Within Defined Limits)        Infusion Pump Details: See Documentation Flowsheet     Central Line Care: See Invasive (Oncology) Lines Flowsheet and MAR for CVAD documentation as appropriate.    Discharged: Stable     Gertrude Gasca NP is supervising clinician today.     
5.20 M/uL Final    Hemoglobin 08/07/2023 11.0 (L)  12.0 - 16.0 g/dL Final    Hematocrit 08/07/2023 32.5 (L)  36.0 - 48.0 % Final    MCV 08/07/2023 89.1  80.0 - 100.0 fL Final    MCH 08/07/2023 30.2  26.0 - 34.0 pg Final    MCHC 08/07/2023 33.9  31.0 - 36.0 g/dL Final    RDW 08/07/2023 15.2  12.4 - 15.4 % Final    Platelets 28/05/0168 217  135 - 450 K/uL Final    MPV 08/07/2023 8.8  5.0 - 10.5 fL Final    Neutrophils % 08/07/2023 58.6  % Final    Lymphocytes % 08/07/2023 25.5  % Final    Monocytes % 08/07/2023 11.3  % Final    Eosinophils % 08/07/2023 3.5  % Final    Basophils % 08/07/2023 1.1  % Final    Neutrophils Absolute 08/07/2023 3.6  1.7 - 7.7 K/uL Final    Lymphocytes Absolute 08/07/2023 1.6  1.0 - 5.1 K/uL Final    Monocytes Absolute 08/07/2023 0.7  0.0 - 1.3 K/uL Final    Eosinophils Absolute 08/07/2023 0.2  0.0 - 0.6 K/uL Final    Basophils Absolute 08/07/2023 0.1  0.0 - 0.2 K/uL Final    Sodium 08/07/2023 142  136 - 145 mmol/L Final    Potassium 08/07/2023 5.2 (H)  3.5 - 5.1 mmol/L Final    Chloride 08/07/2023 106  99 - 110 mmol/L Final    CO2 08/07/2023 25  21 - 32 mmol/L Final    Anion Gap 08/07/2023 11  3 - 16 Final    Glucose 08/07/2023 147 (H)  70 - 99 mg/dL Final    BUN 08/07/2023 35 (H)  7 - 20 mg/dL Final    Creatinine 08/07/2023 2.2 (H)  0.6 - 1.2 mg/dL Final    Est, Glom Filt Rate 08/07/2023 22 (A)  >60 Final    Calcium 08/07/2023 9.7  8.3 - 10.6 mg/dL Final    Phosphorus 08/07/2023 4.6  2.5 - 4.9 mg/dL Final    Albumin 08/07/2023 4.0  3.4 - 5.0 g/dL Final    Protein, Ur 08/07/2023 6.00  <12 mg/dL Final    Creatinine, Ur 08/07/2023 118.6  28.0 - 259.0 mg/dL Final    Protein/Creat Ratio 08/07/2023 0.1  mg/dL Final       Assessment and Plan:     Right thigh pain   Unclear control, changes made today: tylenol,ice and massage--doubt sciatica but will get LS spine    Acute renal failure superimposed on stage 4 chronic kidney disease (HCC)   Labs improving    Multiple myeloma ([address])   Monitored by

## 2024-08-21 RX ORDER — LORAZEPAM 0.5 MG/1
TABLET ORAL
Qty: 60 TABLET | Refills: 4 | OUTPATIENT
Start: 2024-08-21

## 2024-09-12 RX ORDER — LORAZEPAM 0.5 MG/1
TABLET ORAL
Qty: 60 TABLET | Refills: 4 | OUTPATIENT
Start: 2024-09-12

## 2024-09-16 ENCOUNTER — OFFICE VISIT (OUTPATIENT)
Dept: FAMILY MEDICINE CLINIC | Age: 79
End: 2024-09-16
Payer: MEDICARE

## 2024-09-16 VITALS
OXYGEN SATURATION: 98 % | SYSTOLIC BLOOD PRESSURE: 118 MMHG | HEART RATE: 85 BPM | BODY MASS INDEX: 27.17 KG/M2 | WEIGHT: 130 LBS | DIASTOLIC BLOOD PRESSURE: 68 MMHG

## 2024-09-16 DIAGNOSIS — F41.9 ANXIETY: Primary | Chronic | ICD-10-CM

## 2024-09-16 PROCEDURE — 99213 OFFICE O/P EST LOW 20 MIN: CPT | Performed by: STUDENT IN AN ORGANIZED HEALTH CARE EDUCATION/TRAINING PROGRAM

## 2024-09-16 PROCEDURE — 1123F ACP DISCUSS/DSCN MKR DOCD: CPT | Performed by: STUDENT IN AN ORGANIZED HEALTH CARE EDUCATION/TRAINING PROGRAM

## 2024-09-16 RX ORDER — LORAZEPAM 0.5 MG/1
0.5 TABLET ORAL 2 TIMES DAILY PRN
Qty: 60 TABLET | Refills: 0 | Status: SHIPPED | OUTPATIENT
Start: 2024-09-16 | End: 2024-10-16

## 2024-11-04 ENCOUNTER — HOSPITAL ENCOUNTER (OUTPATIENT)
Dept: CT IMAGING | Age: 79
Discharge: HOME | End: 2024-11-04
Attending: INTERNAL MEDICINE
Payer: MEDICARE

## 2024-11-04 DIAGNOSIS — R93.89 ABNORMAL CT OF THE CHEST: ICD-10-CM

## 2024-11-04 PROCEDURE — 71250 CT THORAX DX C-: CPT

## 2024-11-05 ENCOUNTER — NURSE ONLY (OUTPATIENT)
Dept: FAMILY MEDICINE CLINIC | Age: 79
End: 2024-11-05
Payer: MEDICARE

## 2024-11-05 DIAGNOSIS — Z23 FLU VACCINE NEED: Primary | ICD-10-CM

## 2024-11-05 PROCEDURE — 90653 IIV ADJUVANT VACCINE IM: CPT | Performed by: FAMILY MEDICINE

## 2024-11-05 PROCEDURE — G0008 ADMIN INFLUENZA VIRUS VAC: HCPCS | Performed by: FAMILY MEDICINE

## 2024-11-14 ENCOUNTER — OFFICE VISIT (OUTPATIENT)
Dept: PULMONOLOGY | Age: 79
End: 2024-11-14

## 2024-11-14 VITALS
BODY MASS INDEX: 28.42 KG/M2 | SYSTOLIC BLOOD PRESSURE: 128 MMHG | OXYGEN SATURATION: 98 % | HEIGHT: 58 IN | DIASTOLIC BLOOD PRESSURE: 66 MMHG | HEART RATE: 84 BPM | WEIGHT: 135.4 LBS

## 2024-11-14 DIAGNOSIS — C90.00 MULTIPLE MYELOMA, REMISSION STATUS UNSPECIFIED (HCC): ICD-10-CM

## 2024-11-14 DIAGNOSIS — C34.91 PRIMARY ADENOCARCINOMA OF RIGHT LUNG (HCC): Primary | ICD-10-CM

## 2024-11-14 NOTE — PROGRESS NOTES
None.  Other mediastinal structures: Normal.      Upper abdomen: Stable subcentimeter cyst in the right hepatic lobe.     Musculoskeletal: No destructive osseous abnormality. Chronic mild compression fracture of the mid thoracic vertebral body.     IMPRESSION:  Impression:      Stable 3.8 x 1.9 cm masslike consolidation in the right lateral lung apex. No new nodularity or finding to suggest active/recurrent disease.     No convincing evidence of metastatic disease in the chest.    CT CHest 1/30/2024  FINDINGS:     LUNGS AND AIRWAYS: The airways are patent. No evidence of bronchiectasis. No  significant peribronchial thickening the lungs are hyperexpanded. A thin  curvilinear opacity is identified in the left pulmonary apex. Previously  described focus of abnormal parenchymal density in the left pulmonary apex has  resolved. A persistent parenchymal focus in the right pulmonary apex is  unchanged from the previous study. Mild centrilobular emphysematous changes are  noted. No evidence of acute airspace consolidation     PLEURA: No pleural effusions or pleural thickening.     HEART AND GREAT VESSELS: Cardiac size normal. Mild to moderate coronary artery  calcifications are present without significant change. No pericardial effusion  visualized. The thoracic aorta appears normal in diameter and configuration.  Mild atherosclerotic calcifications are redemonstrated.     ADENOPATHY/MEDIASTINUM: No adenopathy.     CHEST WALL / LOWER NECK: No significant abnormality.     UPPER ABDOMEN: Small hiatal hernia redemonstrated.     BONES: No significant abnormality.     IMPRESSION:     1. Interval resolution of suspicious nodular focus noted on previous scan in the  left pulmonary apex. Residual thin curvilinear scar is seen currently.  2. No significant change in right apical masslike density.  3. Mild centrilobular emphysema.    CT Chest 9/29/2023 reveals the following:    Reason: History of lung carcinoma patient presents for

## 2024-11-20 DIAGNOSIS — F41.9 ANXIETY: Chronic | ICD-10-CM

## 2024-11-20 NOTE — TELEPHONE ENCOUNTER
LV 9/16/24 Lisha TORRES not scheduled     [facility] requesting refill Lorazepam 0.5mg qty 60    This refill not on med list

## 2024-11-21 RX ORDER — LORAZEPAM 0.5 MG/1
0.5 TABLET ORAL 2 TIMES DAILY PRN
Qty: 60 TABLET | Refills: 0 | Status: SHIPPED | OUTPATIENT
Start: 2024-11-21 | End: 2024-12-21

## 2024-11-21 NOTE — TELEPHONE ENCOUNTER
Pt saw  2 months ago, would be due for a med check/follow up next month. Would  be willing to fill it for this month and then we can schedule an appointment with him for December?

## 2024-12-09 ENCOUNTER — HOSPITAL ENCOUNTER (OUTPATIENT)
Age: 79
Setting detail: OBSERVATION
Discharge: HOME | End: 2024-12-11
Attending: STUDENT IN AN ORGANIZED HEALTH CARE EDUCATION/TRAINING PROGRAM | Admitting: STUDENT IN AN ORGANIZED HEALTH CARE EDUCATION/TRAINING PROGRAM
Payer: MEDICARE

## 2024-12-09 ENCOUNTER — APPOINTMENT (OUTPATIENT)
Dept: GENERAL RADIOLOGY | Age: 79
End: 2024-12-09
Attending: STUDENT IN AN ORGANIZED HEALTH CARE EDUCATION/TRAINING PROGRAM
Payer: MEDICARE

## 2024-12-09 PROBLEM — C90.00 MULTIPLE MYELOMA WITHOUT REMISSION (HCC): Status: ACTIVE | Noted: 2024-12-09

## 2024-12-09 PROBLEM — C90.02 MULTIPLE MYELOMA IN RELAPSE (HCC): Status: ACTIVE | Noted: 2024-12-09

## 2024-12-09 LAB
ALBUMIN SERPL-MCNC: 3.8 G/DL (ref 3.4–5)
ALP SERPL-CCNC: 69 U/L (ref 40–129)
ALT SERPL-CCNC: 14 U/L (ref 10–40)
ANION GAP SERPL CALCULATED.3IONS-SCNC: 10 MMOL/L (ref 3–16)
APTT BLD: 25.1 SEC (ref 22.1–36.4)
AST SERPL-CCNC: 20 U/L (ref 15–37)
BASOPHILS # BLD: 0.1 K/UL (ref 0–0.2)
BASOPHILS NFR BLD: 1.2 %
BILIRUB DIRECT SERPL-MCNC: <0.1 MG/DL (ref 0–0.3)
BILIRUB INDIRECT SERPL-MCNC: ABNORMAL MG/DL (ref 0–1)
BILIRUB SERPL-MCNC: <0.2 MG/DL (ref 0–1)
BILIRUB UR QL STRIP.AUTO: NEGATIVE
BUN SERPL-MCNC: 29 MG/DL (ref 7–20)
CALCIUM SERPL-MCNC: 8.7 MG/DL (ref 8.3–10.6)
CHLORIDE SERPL-SCNC: 103 MMOL/L (ref 99–110)
CLARITY UR: CLEAR
CO2 SERPL-SCNC: 21 MMOL/L (ref 21–32)
COLOR UR: YELLOW
CREAT SERPL-MCNC: 1.6 MG/DL (ref 0.6–1.2)
DEPRECATED RDW RBC AUTO: 17.1 % (ref 12.4–15.4)
EOSINOPHIL # BLD: 0.1 K/UL (ref 0–0.6)
EOSINOPHIL NFR BLD: 0.7 %
GFR SERPLBLD CREATININE-BSD FMLA CKD-EPI: 33 ML/MIN/{1.73_M2}
GGT SERPL-CCNC: 17 U/L (ref 5–36)
GLUCOSE SERPL-MCNC: 119 MG/DL (ref 70–99)
GLUCOSE UR STRIP.AUTO-MCNC: NEGATIVE MG/DL
HCT VFR BLD AUTO: 34.7 % (ref 36–48)
HGB BLD-MCNC: 11.1 G/DL (ref 12–16)
HGB UR QL STRIP.AUTO: NEGATIVE
INR PPP: 0.99 (ref 0.85–1.15)
KETONES UR STRIP.AUTO-MCNC: NEGATIVE MG/DL
LDH SERPL L TO P-CCNC: 149 U/L (ref 100–190)
LEUKOCYTE ESTERASE UR QL STRIP.AUTO: NEGATIVE
LYMPHOCYTES # BLD: 1.5 K/UL (ref 1–5.1)
LYMPHOCYTES NFR BLD: 15.9 %
MAGNESIUM SERPL-MCNC: 1.8 MG/DL (ref 1.8–2.4)
MCH RBC QN AUTO: 26.1 PG (ref 26–34)
MCHC RBC AUTO-ENTMCNC: 31.9 G/DL (ref 31–36)
MCV RBC AUTO: 81.7 FL (ref 80–100)
MONOCYTES # BLD: 0.4 K/UL (ref 0–1.3)
MONOCYTES NFR BLD: 3.9 %
NEUTROPHILS # BLD: 7.2 K/UL (ref 1.7–7.7)
NEUTROPHILS NFR BLD: 78.3 %
NITRITE UR QL STRIP.AUTO: NEGATIVE
PH UR STRIP.AUTO: 6 [PH] (ref 5–8)
PHOSPHATE SERPL-MCNC: 2.8 MG/DL (ref 2.5–4.9)
PLATELET # BLD AUTO: 228 K/UL (ref 135–450)
PMV BLD AUTO: 8.7 FL (ref 5–10.5)
POTASSIUM SERPL-SCNC: 4.8 MMOL/L (ref 3.5–5.1)
PROT SERPL-MCNC: 6 G/DL (ref 6.4–8.2)
PROT UR STRIP.AUTO-MCNC: NEGATIVE MG/DL
PROTHROMBIN TIME: 13.3 SEC (ref 11.9–14.9)
RBC # BLD AUTO: 4.24 M/UL (ref 4–5.2)
SODIUM SERPL-SCNC: 134 MMOL/L (ref 136–145)
SP GR UR STRIP.AUTO: 1.01 (ref 1–1.03)
UA DIPSTICK W REFLEX MICRO PNL UR: NORMAL
URATE SERPL-MCNC: 5.3 MG/DL (ref 2.6–6)
URN SPEC COLLECT METH UR: NORMAL
UROBILINOGEN UR STRIP-ACNC: 0.2 E.U./DL
WBC # BLD AUTO: 9.3 K/UL (ref 4–11)

## 2024-12-09 PROCEDURE — G0379 DIRECT REFER HOSPITAL OBSERV: HCPCS

## 2024-12-09 PROCEDURE — 2580000003 HC RX 258: Performed by: NURSE PRACTITIONER

## 2024-12-09 PROCEDURE — 85730 THROMBOPLASTIN TIME PARTIAL: CPT

## 2024-12-09 PROCEDURE — 83735 ASSAY OF MAGNESIUM: CPT

## 2024-12-09 PROCEDURE — 81003 URINALYSIS AUTO W/O SCOPE: CPT

## 2024-12-09 PROCEDURE — 96372 THER/PROPH/DIAG INJ SC/IM: CPT

## 2024-12-09 PROCEDURE — 6370000000 HC RX 637 (ALT 250 FOR IP): Performed by: INTERNAL MEDICINE

## 2024-12-09 PROCEDURE — 82977 ASSAY OF GGT: CPT

## 2024-12-09 PROCEDURE — 94150 VITAL CAPACITY TEST: CPT

## 2024-12-09 PROCEDURE — 85610 PROTHROMBIN TIME: CPT

## 2024-12-09 PROCEDURE — A4217 STERILE WATER/SALINE, 500 ML: HCPCS | Performed by: NURSE PRACTITIONER

## 2024-12-09 PROCEDURE — 6370000000 HC RX 637 (ALT 250 FOR IP): Performed by: PHYSICIAN ASSISTANT

## 2024-12-09 PROCEDURE — 93005 ELECTROCARDIOGRAM TRACING: CPT | Performed by: NURSE PRACTITIONER

## 2024-12-09 PROCEDURE — 80048 BASIC METABOLIC PNL TOTAL CA: CPT

## 2024-12-09 PROCEDURE — 71046 X-RAY EXAM CHEST 2 VIEWS: CPT

## 2024-12-09 PROCEDURE — G0378 HOSPITAL OBSERVATION PER HR: HCPCS

## 2024-12-09 PROCEDURE — 6370000000 HC RX 637 (ALT 250 FOR IP): Performed by: NURSE PRACTITIONER

## 2024-12-09 PROCEDURE — 84550 ASSAY OF BLOOD/URIC ACID: CPT

## 2024-12-09 PROCEDURE — 84100 ASSAY OF PHOSPHORUS: CPT

## 2024-12-09 PROCEDURE — 80076 HEPATIC FUNCTION PANEL: CPT

## 2024-12-09 PROCEDURE — 6360000002 HC RX W HCPCS: Performed by: NURSE PRACTITIONER

## 2024-12-09 PROCEDURE — 85025 COMPLETE CBC W/AUTO DIFF WBC: CPT

## 2024-12-09 PROCEDURE — 83615 LACTATE (LD) (LDH) ENZYME: CPT

## 2024-12-09 RX ORDER — POTASSIUM CHLORIDE 29.8 MG/ML
20 INJECTION INTRAVENOUS PRN
Status: DISCONTINUED | OUTPATIENT
Start: 2024-12-09 | End: 2024-12-11 | Stop reason: HOSPADM

## 2024-12-09 RX ORDER — SODIUM CHLORIDE 0.9 % (FLUSH) 0.9 %
5-40 SYRINGE (ML) INJECTION EVERY 12 HOURS SCHEDULED
Status: DISCONTINUED | OUTPATIENT
Start: 2024-12-09 | End: 2024-12-11 | Stop reason: HOSPADM

## 2024-12-09 RX ORDER — CALCIUM CARBONATE 500 MG/1
500 TABLET, CHEWABLE ORAL 3 TIMES DAILY PRN
Status: DISCONTINUED | OUTPATIENT
Start: 2024-12-09 | End: 2024-12-11 | Stop reason: HOSPADM

## 2024-12-09 RX ORDER — ONDANSETRON 8 MG/1
8 TABLET, FILM COATED ORAL EVERY 8 HOURS PRN
Status: DISCONTINUED | OUTPATIENT
Start: 2024-12-09 | End: 2024-12-11 | Stop reason: HOSPADM

## 2024-12-09 RX ORDER — ASPIRIN 325 MG
325 TABLET, DELAYED RELEASE (ENTERIC COATED) ORAL DAILY
Status: DISCONTINUED | OUTPATIENT
Start: 2024-12-10 | End: 2024-12-11 | Stop reason: HOSPADM

## 2024-12-09 RX ORDER — ATORVASTATIN CALCIUM 20 MG/1
20 TABLET, FILM COATED ORAL NIGHTLY
Status: DISCONTINUED | OUTPATIENT
Start: 2024-12-09 | End: 2024-12-11 | Stop reason: HOSPADM

## 2024-12-09 RX ORDER — LORAZEPAM 0.5 MG/1
0.5 TABLET ORAL 2 TIMES DAILY PRN
Status: DISCONTINUED | OUTPATIENT
Start: 2024-12-09 | End: 2024-12-11 | Stop reason: HOSPADM

## 2024-12-09 RX ORDER — SENNA AND DOCUSATE SODIUM 50; 8.6 MG/1; MG/1
2 TABLET, FILM COATED ORAL 2 TIMES DAILY PRN
Status: DISCONTINUED | OUTPATIENT
Start: 2024-12-09 | End: 2024-12-11 | Stop reason: HOSPADM

## 2024-12-09 RX ORDER — SODIUM CHLORIDE 9 MG/ML
INJECTION, SOLUTION INTRAVENOUS PRN
Status: DISCONTINUED | OUTPATIENT
Start: 2024-12-09 | End: 2024-12-11 | Stop reason: HOSPADM

## 2024-12-09 RX ORDER — ENOXAPARIN SODIUM 100 MG/ML
30 INJECTION SUBCUTANEOUS DAILY
Status: DISCONTINUED | OUTPATIENT
Start: 2024-12-09 | End: 2024-12-11 | Stop reason: HOSPADM

## 2024-12-09 RX ORDER — MAGNESIUM SULFATE IN WATER 40 MG/ML
4000 INJECTION, SOLUTION INTRAVENOUS PRN
Status: DISCONTINUED | OUTPATIENT
Start: 2024-12-09 | End: 2024-12-11 | Stop reason: HOSPADM

## 2024-12-09 RX ORDER — PANTOPRAZOLE SODIUM 40 MG/1
40 TABLET, DELAYED RELEASE ORAL
Status: DISCONTINUED | OUTPATIENT
Start: 2024-12-10 | End: 2024-12-10

## 2024-12-09 RX ORDER — SODIUM CHLORIDE 0.9 % (FLUSH) 0.9 %
5-40 SYRINGE (ML) INJECTION PRN
Status: DISCONTINUED | OUTPATIENT
Start: 2024-12-09 | End: 2024-12-11 | Stop reason: HOSPADM

## 2024-12-09 RX ORDER — ACYCLOVIR 400 MG/1
400 TABLET ORAL 2 TIMES DAILY
Status: DISCONTINUED | OUTPATIENT
Start: 2024-12-09 | End: 2024-12-11 | Stop reason: HOSPADM

## 2024-12-09 RX ORDER — MECOBALAMIN 5000 MCG
5 TABLET,DISINTEGRATING ORAL NIGHTLY PRN
Status: DISCONTINUED | OUTPATIENT
Start: 2024-12-09 | End: 2024-12-11 | Stop reason: HOSPADM

## 2024-12-09 RX ORDER — POLYVINYL ALCOHOL 14 MG/ML
2 SOLUTION/ DROPS OPHTHALMIC PRN
Status: DISCONTINUED | OUTPATIENT
Start: 2024-12-09 | End: 2024-12-11 | Stop reason: HOSPADM

## 2024-12-09 RX ORDER — ASPIRIN 325 MG
325 TABLET, DELAYED RELEASE (ENTERIC COATED) ORAL DAILY
COMMUNITY

## 2024-12-09 RX ORDER — SODIUM CHLORIDE 9 MG/ML
INJECTION, SOLUTION INTRAVENOUS CONTINUOUS PRN
Status: DISCONTINUED | OUTPATIENT
Start: 2024-12-09 | End: 2024-12-11 | Stop reason: HOSPADM

## 2024-12-09 RX ADMIN — SODIUM CHLORIDE, PRESERVATIVE FREE 10 ML: 5 INJECTION INTRAVENOUS at 20:52

## 2024-12-09 RX ADMIN — ENOXAPARIN SODIUM 30 MG: 100 INJECTION SUBCUTANEOUS at 13:55

## 2024-12-09 RX ADMIN — ATORVASTATIN CALCIUM 20 MG: 20 TABLET, FILM COATED ORAL at 20:52

## 2024-12-09 RX ADMIN — SODIUM CHLORIDE 15 ML: 900 IRRIGANT IRRIGATION at 20:52

## 2024-12-09 RX ADMIN — ACYCLOVIR 400 MG: 400 TABLET ORAL at 20:52

## 2024-12-09 RX ADMIN — POLYVINYL ALCOHOL 2 DROP: 1.4 SOLUTION/ DROPS OPHTHALMIC at 23:59

## 2024-12-09 NOTE — CARE COORDINATION
Case Management Assessment  Initial Evaluation    Date/Time of Evaluation: 12/9/2024 2:48 PM  Assessment Completed by: VINCENT Martino   for [facility] ([facility])  [employer] Mobile: [phone]    If patient is discharged prior to next notation, then this note serves as note for discharge by case management.    Patient Name: Carina Angelo                   YOB: 1945  Diagnosis: Multiple myeloma in relapse (HCC) [C90.02]                   Date / Time: 12/9/2024 12:27 PM    Patient Admission Status: Observation   Readmission Risk (Low < 19, Mod (19-27), High > 27): No data recorded  Current PCP: Jacky Moss MD  PCP verified by CM? Yes    Chart Reviewed: Yes      History Provided by: Patient  Patient Orientation: Alert and Oriented    Patient Cognition: Alert    Hospitalization in the last 30 days (Readmission):  No    If yes, Readmission Assessment in CM Navigator will be completed.    Advance Directives:      Code Status: Full Code   Patient's Primary Decision Maker is: Legal Next of Kin    Primary Decision Maker: JUAN ALBERTO ANGELO - Spouse - [phone]    Secondary Decision Maker: Gricel Ramirez - Child - [phone]    Discharge Planning:    Patient lives with: Spouse/Significant Other Type of Home: House  Primary Care Giver: Self  Patient Support Systems include: Spouse/Significant Other   Current Financial resources: Other (Comment) (Pt stated she has [place])  Current community resources: None  Current services prior to admission: Other (Comment) ([facility])            Current DME:              Type of Home Care services:  None    ADLS  Prior functional level: Independent in ADLs/IADLs  Current functional level: Independent in ADLs/IADLs    PT AM-PAC:   /24  OT AM-PAC:   /24    Family can provide assistance at DC: Yes  Would you like Case Management to discuss the discharge plan with any other family members/significant others, and if so, who?

## 2024-12-09 NOTE — PLAN OF CARE
Problem: Safety - Adult  Goal: Free from fall injury  Outcome: Progressing  Note: Orthostatic vital signs obtained at start of shift - see flowsheet for details.  Pt does not meet criteria for orthostasis.  Pt is a Low fall risk. See Hernandez Fall Score and ABCDS Injury Risk assessments.   - Screening for Orthostasis AND not a [place] Risk per HERNANDEZ/ABCDS: Pt bed is in low position, side rails up, call light and belongings are in reach.  Fall risk light is on outside pts room.  Pt encouraged to call for assistance as needed. Will continue with hourly rounds for PO intake, pain needs, toileting and repositioning as needed.      Problem: ABCDS Injury Assessment  Goal: Absence of physical injury  Outcome: Progressing  Note: No new reported or observed physical injuries.      Problem: Skin/Tissue Integrity  Goal: Absence of new skin breakdown  Description: 1.  Monitor for areas of redness and/or skin breakdown  2.  Assess vascular access sites hourly  3.  Every 4-6 hours minimum:  Change oxygen saturation probe site  4.  Every 4-6 hours:  If on nasal continuous positive airway pressure, respiratory therapy assess nares and determine need for appliance change or resting period.  Outcome: Progressing  Note: No new skin breakdown this shift. Some bruising around IV/injection sites.

## 2024-12-09 NOTE — H&P
per Week: 0 days     Minutes of Exercise per Session: 0 min   Stress: Not on file   Social Connections: Not on file   Intimate Partner Violence: Unknown (1/21/2024)    [facility]    Interpersonal Safety     Feel physically or emotionally unsafe where currently live: Not on file     Harm by anyone: Not on file     Emotionally Harmed: Not on file   Housing Stability: Unknown (1/21/2024)    [facility]    Housing/Utilities     Worried about losing home: Not on file     Stayed outside house: Not on file     Unable to get utilities: Not on file        ROS:  As noted above, otherwise remainder of 10-point ROS negative    Physical Exam:     Vital Signs:  There were no vitals taken for this visit.    Weight:    Wt Readings from Last 3 Encounters:   11/19/24 61.3 kg (135 lb 3.2 oz)   11/14/24 61.4 kg (135 lb 6.4 oz)   09/16/24 59 kg (130 lb)       KPS: 90% Able to carry on normal activity; minor signs or symptoms of disease    ECOG PS:  (1) Restricted in physically strenuous activity, ambulatory and able to do work of light nature    General: Awake, alert and oriented.  HEENT: normocephalic, PERRL, no scleral erythema or icterus, Oral mucosa moist and intact, throat clear  NECK: supple   BACK: Straight   SKIN: warm dry and intact without lesions rashes or masses  CHEST: CTA bilaterally without use of accessory muscles  CV: Normal S1 S2, RRR, no MRG  ABD: NT ND normoactive BS  EXTREMITIES: without edema, denies calf tenderness  NEURO: CN II - XII grossly intact  CATHETER: PIV    Laboratory Data:   CBC: No results for input(s): \"WBC\", \"HGB\", \"HCT\", \"MCV\", \"PLT\" in the last 72 hours.  BMP/Mag:No results for input(s): \"NA\", \"K\", \"CL\", \"CO2\", \"PHOS\", \"BUN\", \"CREATININE\", \"MG\" in the last 72 hours.    Invalid input(s): \"CA\"  LIVP: No results for input(s): \"AST\",

## 2024-12-10 LAB
ANION GAP SERPL CALCULATED.3IONS-SCNC: 12 MMOL/L (ref 3–16)
BASOPHILS # BLD: 0 K/UL (ref 0–0.2)
BASOPHILS NFR BLD: 0.2 %
BUN SERPL-MCNC: 31 MG/DL (ref 7–20)
CALCIUM SERPL-MCNC: 9.2 MG/DL (ref 8.3–10.6)
CHLORIDE SERPL-SCNC: 105 MMOL/L (ref 99–110)
CO2 SERPL-SCNC: 21 MMOL/L (ref 21–32)
CREAT SERPL-MCNC: 1.8 MG/DL (ref 0.6–1.2)
CRP SERPL-MCNC: <3 MG/L (ref 0–5.1)
DEPRECATED RDW RBC AUTO: 17 % (ref 12.4–15.4)
EKG ATRIAL RATE: 87 BPM
EKG DIAGNOSIS: NORMAL
EKG P AXIS: 235 DEGREES
EKG P-R INTERVAL: 138 MS
EKG Q-T INTERVAL: 356 MS
EKG QRS DURATION: 82 MS
EKG QTC CALCULATION (BAZETT): 428 MS
EKG R AXIS: 89 DEGREES
EKG T AXIS: 31 DEGREES
EKG VENTRICULAR RATE: 87 BPM
EOSINOPHIL # BLD: 0 K/UL (ref 0–0.6)
EOSINOPHIL NFR BLD: 0 %
FERRITIN SERPL IA-MCNC: 18.6 NG/ML (ref 15–150)
GFR SERPLBLD CREATININE-BSD FMLA CKD-EPI: 28 ML/MIN/{1.73_M2}
GLUCOSE SERPL-MCNC: 132 MG/DL (ref 70–99)
HCT VFR BLD AUTO: 37.5 % (ref 36–48)
HGB BLD-MCNC: 11.7 G/DL (ref 12–16)
LYMPHOCYTES # BLD: 0.7 K/UL (ref 1–5.1)
LYMPHOCYTES NFR BLD: 8.1 %
MAGNESIUM SERPL-MCNC: 2.06 MG/DL (ref 1.8–2.4)
MCH RBC QN AUTO: 25.6 PG (ref 26–34)
MCHC RBC AUTO-ENTMCNC: 31.3 G/DL (ref 31–36)
MCV RBC AUTO: 81.8 FL (ref 80–100)
MONOCYTES # BLD: 0.4 K/UL (ref 0–1.3)
MONOCYTES NFR BLD: 4.3 %
NEUTROPHILS # BLD: 7.6 K/UL (ref 1.7–7.7)
NEUTROPHILS NFR BLD: 87.4 %
PHOSPHATE SERPL-MCNC: 2.9 MG/DL (ref 2.5–4.9)
PLATELET # BLD AUTO: 259 K/UL (ref 135–450)
PMV BLD AUTO: 9.3 FL (ref 5–10.5)
POTASSIUM SERPL-SCNC: 4.7 MMOL/L (ref 3.5–5.1)
RBC # BLD AUTO: 4.59 M/UL (ref 4–5.2)
SODIUM SERPL-SCNC: 138 MMOL/L (ref 136–145)
WBC # BLD AUTO: 8.7 K/UL (ref 4–11)

## 2024-12-10 PROCEDURE — 6370000000 HC RX 637 (ALT 250 FOR IP): Performed by: NURSE PRACTITIONER

## 2024-12-10 PROCEDURE — 83735 ASSAY OF MAGNESIUM: CPT

## 2024-12-10 PROCEDURE — 85025 COMPLETE CBC W/AUTO DIFF WBC: CPT

## 2024-12-10 PROCEDURE — 86140 C-REACTIVE PROTEIN: CPT

## 2024-12-10 PROCEDURE — 6370000000 HC RX 637 (ALT 250 FOR IP): Performed by: PHYSICIAN ASSISTANT

## 2024-12-10 PROCEDURE — 6360000002 HC RX W HCPCS: Performed by: NURSE PRACTITIONER

## 2024-12-10 PROCEDURE — 93010 ELECTROCARDIOGRAM REPORT: CPT | Performed by: INTERNAL MEDICINE

## 2024-12-10 PROCEDURE — 80048 BASIC METABOLIC PNL TOTAL CA: CPT

## 2024-12-10 PROCEDURE — 96372 THER/PROPH/DIAG INJ SC/IM: CPT

## 2024-12-10 PROCEDURE — 36415 COLL VENOUS BLD VENIPUNCTURE: CPT

## 2024-12-10 PROCEDURE — G0378 HOSPITAL OBSERVATION PER HR: HCPCS

## 2024-12-10 PROCEDURE — 82728 ASSAY OF FERRITIN: CPT

## 2024-12-10 PROCEDURE — 84100 ASSAY OF PHOSPHORUS: CPT

## 2024-12-10 PROCEDURE — 2580000003 HC RX 258: Performed by: NURSE PRACTITIONER

## 2024-12-10 RX ORDER — SODIUM CHLORIDE 9 MG/ML
INJECTION, SOLUTION INTRAVENOUS CONTINUOUS
Status: DISCONTINUED | OUTPATIENT
Start: 2024-12-10 | End: 2024-12-11 | Stop reason: HOSPADM

## 2024-12-10 RX ORDER — PANTOPRAZOLE SODIUM 40 MG/1
40 TABLET, DELAYED RELEASE ORAL
Status: DISCONTINUED | OUTPATIENT
Start: 2024-12-10 | End: 2024-12-11 | Stop reason: HOSPADM

## 2024-12-10 RX ADMIN — ANTACID TABLETS 500 MG: 500 TABLET, CHEWABLE ORAL at 12:30

## 2024-12-10 RX ADMIN — SODIUM CHLORIDE, PRESERVATIVE FREE 10 ML: 5 INJECTION INTRAVENOUS at 20:42

## 2024-12-10 RX ADMIN — PANTOPRAZOLE SODIUM 40 MG: 40 TABLET, DELAYED RELEASE ORAL at 16:14

## 2024-12-10 RX ADMIN — PANTOPRAZOLE SODIUM 40 MG: 40 TABLET, DELAYED RELEASE ORAL at 07:22

## 2024-12-10 RX ADMIN — SODIUM CHLORIDE: 9 INJECTION, SOLUTION INTRAVENOUS at 09:21

## 2024-12-10 RX ADMIN — ACYCLOVIR 400 MG: 400 TABLET ORAL at 20:42

## 2024-12-10 RX ADMIN — SODIUM CHLORIDE 15 ML: 900 IRRIGANT IRRIGATION at 20:45

## 2024-12-10 RX ADMIN — POLYVINYL ALCOHOL 2 DROP: 1.4 SOLUTION/ DROPS OPHTHALMIC at 21:56

## 2024-12-10 RX ADMIN — ALUMINUM HYDROXIDE, MAGNESIUM HYDROXIDE, AND SIMETHICONE: 1200; 120; 1200 SUSPENSION ORAL at 16:14

## 2024-12-10 RX ADMIN — SODIUM CHLORIDE, PRESERVATIVE FREE 10 ML: 5 INJECTION INTRAVENOUS at 08:37

## 2024-12-10 RX ADMIN — POLYVINYL ALCOHOL 2 DROP: 1.4 SOLUTION/ DROPS OPHTHALMIC at 07:22

## 2024-12-10 RX ADMIN — CALCIUM CARBONATE-VITAMIN D TAB 500 MG-200 UNIT 1 TABLET: 500-200 TAB at 08:36

## 2024-12-10 RX ADMIN — ANTACID TABLETS 500 MG: 500 TABLET, CHEWABLE ORAL at 00:02

## 2024-12-10 RX ADMIN — ATORVASTATIN CALCIUM 20 MG: 20 TABLET, FILM COATED ORAL at 20:42

## 2024-12-10 RX ADMIN — ASPIRIN 325 MG: 325 TABLET, COATED ORAL at 08:36

## 2024-12-10 RX ADMIN — ACYCLOVIR 400 MG: 400 TABLET ORAL at 08:36

## 2024-12-10 RX ADMIN — ENOXAPARIN SODIUM 30 MG: 100 INJECTION SUBCUTANEOUS at 08:37

## 2024-12-10 NOTE — DISCHARGE INSTRUCTIONS
[facility]  [facility] Discharge Instructions    Call for Questions/Concerns:  [phone] ([phone]) [facility] office  The phone number listed above is available 24 hrs/7 days per week    If you experience issues reaching the On-Call physician in a timely manner? Please call the [facility] unit and speak to the Charge RN- [phone]    [facility] is open M-F 8am-4:30pm; Sat-Sun/Holidays 8am-1pm    Symptoms to Report Immediately:    Fever of 100.4 or greater or chills and shaking present or when [employer] alerts you that you have a fever.r  Signs or symptoms of CRS: racing heart, hypoxia (Shortness of breath) low blood pressure, achy, joint pain, muscle stiffness  Any signs of Neurotoxicity: Confusion, Subtle changes in mental status, dizziness, lightheadedness, Headache  Vomiting without relief after use of anti-nausea medication  Severe abdominal cramping or muscle/joint pain  Diarrhea: More than 3 loose, watery bowel movements in a 24 hour period  Unusual or excessive bleeding from your mouth, nose, rectum, bladder or vagina  Sudden onset of shortness of breath or chest pain  Signs/symptoms of infection: redness, warmth, swelling-particularly to central line site    Report to Physician's office within 24 hours:    Pain not relieved by pain medication  Change in urination-odor, cloudiness, frequency, or pain with urination  Flu-like symptoms  Skin changes-rash, hives, redness or peeling of skin    Additional Instructions:    Avoid people with colds, flu-like symptoms, or any sign of infection  Drink plenty of fluids-attempt to consume 2-3 liters ( ounces) of fluids/24 hour period  Continue low microbial diet until instructed by physician to resume normal diet  Bring all of your medications with you to your doctor's appointments  Bring your current medicine list to each hospital and office visit      You are being discharged with IV access due to need for ongoing therapy.  Below is pertinent

## 2024-12-10 NOTE — PLAN OF CARE
Problem: Chronic Conditions and Co-morbidities  Goal: Patient's chronic conditions and co-morbidity symptoms are monitored and maintained or improved  Outcome: Progressing  Flowsheets (Taken 12/10/2024 0256)  Care Plan - Patient's Chronic Conditions and Co-Morbidity Symptoms are Monitored and Maintained or Improved:   Monitor and assess patient's chronic conditions and comorbid symptoms for stability, deterioration, or improvement   Collaborate with multidisciplinary team to address chronic and comorbid conditions and prevent exacerbation or deterioration   Update acute care plan with appropriate goals if chronic or comorbid symptoms are exacerbated and prevent overall improvement and discharge     Problem: Safety - Adult  Goal: Free from fall injury  12/10/2024 0256 by Belen Khan, RN  Outcome: Progressing  Flowsheets (Taken 12/10/2024 0256)  Free From Fall Injury:   Instruct family/caregiver on patient safety   Based on caregiver fall risk screen, instruct family/caregiver to ask for assistance with transferring infant if caregiver noted to have fall risk factors  Note: Orthostatic vital signs obtained at start of shift - see flowsheet for details.  Pt does not meet criteria for orthostasis.  Pt is a Low fall risk. See Hernandez Fall Score and ABCDS Injury Risk assessments.   - Screening for Orthostasis AND not a [place] Risk per HERNANDEZ/ABCDS: Pt bed is in low position, side rails up, call light and belongings are in reach.  Fall risk light is on outside pts room.  Pt encouraged to call for assistance as needed. Will continue with hourly rounds for PO intake, pain needs, toileting and repositioning as needed.      Problem: ABCDS Injury Assessment  Goal: Absence of physical injury  12/10/2024 0256 by Belen Khan, RN  Outcome: Progressing  Flowsheets (Taken 12/10/2024 0256)  Absence of Physical Injury: Implement safety measures based on patient assessment     Problem: Skin/Tissue Integrity  Goal: Absence

## 2024-12-10 NOTE — CARE COORDINATION
Team aware pt doesn't have health insurance listed in epic.     Per Lizy [facility], pt has [employer] at [facility].     Message sent to [employer] again.    Celeste WESTON, VINCENT   for [facility] ([facility])  [employer] Mobile: [phone]

## 2024-12-10 NOTE — PLAN OF CARE
Problem: Chronic Conditions and Co-morbidities  Goal: Patient's chronic conditions and co-morbidity symptoms are monitored and maintained or improved  12/10/2024 1608 by Kanchan Skelton RN  Outcome: Progressing  Pt Car-T 10/10 this shift. Pt continues to have ongoing heartburn this shift, new orders placed.    Problem: Safety - Adult  Goal: Free from fall injury  12/10/2024 1608 by Kanchan Skelton, RN  Outcome: Progressing  Pt up ad april, ortho negative, call light within reach     Problem: ABCDS Injury Assessment  Goal: Absence of physical injury  12/10/2024 1608 by Kanchan Skelton, RN  Outcome: Progressing  Pt up ad april, ortho negative, call light within reach     Problem: Skin/Tissue Integrity  Goal: Absence of new skin breakdown  Description: 1.  Monitor for areas of redness and/or skin breakdown  2.  Assess vascular access sites hourly  3.  Every 4-6 hours minimum:  Change oxygen saturation probe site  4.  Every 4-6 hours:  If on nasal continuous positive airway pressure, respiratory therapy assess nares and determine need for appliance change or resting period.  12/10/2024 1608 by Kanchan Skelton, RN  Outcome: Progressing  No new skin breakdown this shift.     Problem: Hematologic - Adult  Goal: Maintains hematologic stability  12/10/2024 1608 by Kanchan Skelton RN  Outcome: Progressing  Patient's hemoglobin this AM:   Recent Labs     12/10/24  0402   HGB 11.7*     Patient's platelet count this AM:   Recent Labs     12/10/24  0402       Thrombocytopenia not present at this time.  Patient showing no signs or symptoms of active bleeding.  Transfusion not indicated at this time.  Patient verbalizes understanding of all instructions. Will continue to assess and implement POC. Call light within reach and hourly rounding in place.

## 2024-12-11 VITALS
HEART RATE: 93 BPM | RESPIRATION RATE: 16 BRPM | DIASTOLIC BLOOD PRESSURE: 72 MMHG | HEIGHT: 58 IN | SYSTOLIC BLOOD PRESSURE: 138 MMHG | OXYGEN SATURATION: 97 % | WEIGHT: 131.8 LBS | TEMPERATURE: 98.1 F | BODY MASS INDEX: 27.66 KG/M2

## 2024-12-11 LAB
ALBUMIN SERPL-MCNC: 3.5 G/DL (ref 3.4–5)
ALP SERPL-CCNC: 60 U/L (ref 40–129)
ALT SERPL-CCNC: 9 U/L (ref 10–40)
ANION GAP SERPL CALCULATED.3IONS-SCNC: 11 MMOL/L (ref 3–16)
AST SERPL-CCNC: 16 U/L (ref 15–37)
BASOPHILS # BLD: 0.1 K/UL (ref 0–0.2)
BASOPHILS NFR BLD: 1.2 %
BILIRUB DIRECT SERPL-MCNC: <0.1 MG/DL (ref 0–0.3)
BILIRUB INDIRECT SERPL-MCNC: ABNORMAL MG/DL (ref 0–1)
BILIRUB SERPL-MCNC: <0.2 MG/DL (ref 0–1)
BUN SERPL-MCNC: 30 MG/DL (ref 7–20)
CALCIUM SERPL-MCNC: 8.7 MG/DL (ref 8.3–10.6)
CHLORIDE SERPL-SCNC: 109 MMOL/L (ref 99–110)
CO2 SERPL-SCNC: 21 MMOL/L (ref 21–32)
CREAT SERPL-MCNC: 1.7 MG/DL (ref 0.6–1.2)
CRP SERPL-MCNC: <3 MG/L (ref 0–5.1)
DEPRECATED RDW RBC AUTO: 17.1 % (ref 12.4–15.4)
EOSINOPHIL # BLD: 0.1 K/UL (ref 0–0.6)
EOSINOPHIL NFR BLD: 0.9 %
FERRITIN SERPL IA-MCNC: 16.4 NG/ML (ref 15–150)
GFR SERPLBLD CREATININE-BSD FMLA CKD-EPI: 30 ML/MIN/{1.73_M2}
GGT SERPL-CCNC: 15 U/L (ref 5–36)
GLUCOSE SERPL-MCNC: 88 MG/DL (ref 70–99)
HCT VFR BLD AUTO: 33.4 % (ref 36–48)
HGB BLD-MCNC: 10.7 G/DL (ref 12–16)
LDH SERPL L TO P-CCNC: 158 U/L (ref 100–190)
LYMPHOCYTES # BLD: 0.4 K/UL (ref 1–5.1)
LYMPHOCYTES NFR BLD: 6.9 %
MAGNESIUM SERPL-MCNC: 1.88 MG/DL (ref 1.8–2.4)
MCH RBC QN AUTO: 26.3 PG (ref 26–34)
MCHC RBC AUTO-ENTMCNC: 32 G/DL (ref 31–36)
MCV RBC AUTO: 82 FL (ref 80–100)
MONOCYTES # BLD: 0.5 K/UL (ref 0–1.3)
MONOCYTES NFR BLD: 8.3 %
NEUTROPHILS # BLD: 5.3 K/UL (ref 1.7–7.7)
NEUTROPHILS NFR BLD: 82.7 %
PHOSPHATE SERPL-MCNC: 2.8 MG/DL (ref 2.5–4.9)
PLATELET # BLD AUTO: 225 K/UL (ref 135–450)
PMV BLD AUTO: 9.2 FL (ref 5–10.5)
POTASSIUM SERPL-SCNC: 4.4 MMOL/L (ref 3.5–5.1)
PROT SERPL-MCNC: 5.4 G/DL (ref 6.4–8.2)
RBC # BLD AUTO: 4.08 M/UL (ref 4–5.2)
SODIUM SERPL-SCNC: 141 MMOL/L (ref 136–145)
URATE SERPL-MCNC: 4.7 MG/DL (ref 2.6–6)
WBC # BLD AUTO: 6.4 K/UL (ref 4–11)

## 2024-12-11 PROCEDURE — 85025 COMPLETE CBC W/AUTO DIFF WBC: CPT

## 2024-12-11 PROCEDURE — 86140 C-REACTIVE PROTEIN: CPT

## 2024-12-11 PROCEDURE — 83615 LACTATE (LD) (LDH) ENZYME: CPT

## 2024-12-11 PROCEDURE — 82977 ASSAY OF GGT: CPT

## 2024-12-11 PROCEDURE — 36415 COLL VENOUS BLD VENIPUNCTURE: CPT

## 2024-12-11 PROCEDURE — 2580000003 HC RX 258: Performed by: NURSE PRACTITIONER

## 2024-12-11 PROCEDURE — 6370000000 HC RX 637 (ALT 250 FOR IP): Performed by: NURSE PRACTITIONER

## 2024-12-11 PROCEDURE — 84550 ASSAY OF BLOOD/URIC ACID: CPT

## 2024-12-11 PROCEDURE — 83735 ASSAY OF MAGNESIUM: CPT

## 2024-12-11 PROCEDURE — 6360000002 HC RX W HCPCS: Performed by: NURSE PRACTITIONER

## 2024-12-11 PROCEDURE — 80048 BASIC METABOLIC PNL TOTAL CA: CPT

## 2024-12-11 PROCEDURE — 80076 HEPATIC FUNCTION PANEL: CPT

## 2024-12-11 PROCEDURE — 84100 ASSAY OF PHOSPHORUS: CPT

## 2024-12-11 PROCEDURE — 96372 THER/PROPH/DIAG INJ SC/IM: CPT

## 2024-12-11 PROCEDURE — 6370000000 HC RX 637 (ALT 250 FOR IP): Performed by: PHYSICIAN ASSISTANT

## 2024-12-11 PROCEDURE — 82728 ASSAY OF FERRITIN: CPT

## 2024-12-11 PROCEDURE — G0378 HOSPITAL OBSERVATION PER HR: HCPCS

## 2024-12-11 RX ORDER — FLUCONAZOLE 100 MG/1
200 TABLET ORAL DAILY
Qty: 30 TABLET | Refills: 0 | Status: SHIPPED | OUTPATIENT
Start: 2024-12-11 | End: 2024-12-18

## 2024-12-11 RX ORDER — LEVOFLOXACIN 500 MG/1
500 TABLET, FILM COATED ORAL DAILY
Qty: 30 TABLET | Refills: 0 | Status: SHIPPED | OUTPATIENT
Start: 2024-12-11 | End: 2024-12-18

## 2024-12-11 RX ADMIN — ENOXAPARIN SODIUM 30 MG: 100 INJECTION SUBCUTANEOUS at 09:49

## 2024-12-11 RX ADMIN — POLYVINYL ALCOHOL 2 DROP: 1.4 SOLUTION/ DROPS OPHTHALMIC at 09:54

## 2024-12-11 RX ADMIN — CALCIUM CARBONATE-VITAMIN D TAB 500 MG-200 UNIT 1 TABLET: 500-200 TAB at 09:49

## 2024-12-11 RX ADMIN — SODIUM CHLORIDE: 9 INJECTION, SOLUTION INTRAVENOUS at 05:07

## 2024-12-11 RX ADMIN — LORAZEPAM 0.5 MG: 0.5 TABLET ORAL at 00:02

## 2024-12-11 RX ADMIN — ASPIRIN 325 MG: 325 TABLET, COATED ORAL at 09:49

## 2024-12-11 RX ADMIN — SODIUM CHLORIDE, PRESERVATIVE FREE 10 ML: 5 INJECTION INTRAVENOUS at 09:50

## 2024-12-11 RX ADMIN — PANTOPRAZOLE SODIUM 40 MG: 40 TABLET, DELAYED RELEASE ORAL at 07:32

## 2024-12-11 RX ADMIN — ACYCLOVIR 400 MG: 400 TABLET ORAL at 09:49

## 2024-12-11 NOTE — CARE COORDINATION
Addendum at 10:09am:          Case Management Assessment            Discharge Note                    Date / Time of Note: 12/11/2024 10:09 AM                  Discharge Note Completed by: VINCENT Martino   for [facility] ([facility])  [employer] Mobile: [phone]    Patient Name: Carina Angelo   YOB: 1945  Diagnosis: Multiple myeloma in relapse (HCC) [C90.02]  Multiple myeloma without remission (HCC) [C90.00]   Date / Time: 12/9/2024 12:27 PM    Current PCP: Jacky Moss MD  Clinic patient: No    Hospitalization in the last 30 days: No       Advance Directives:  Code Status: Full Code  [place] DNR form completed and on chart: Not Indicated    Financial:  Payor: [facility] [employer] MEDICARE / [facility] / Product Type: *No Product type* /      [facility]    [facility] [address] [place] [address] - P [phone] - F [phone]  [address] [place] [address]  Phone: [phone] Fax: [phone]      Assistance purchasing medications?: Potential Assistance Purchasing Medications: No  Assistance provided by Case Management: None at this time    Does patient want to participate in local refill/ meds to beds program?:      Meds To Beds General Rules:  1. Can ONLY be done Monday- Friday between 8:30am-5pm  2. Prescription(s) must be in pharmacy by 3pm to be filled same day  3.Copy of patient's insurance/ prescription drug card and patient face sheet must be sent along with the prescription(s)  4. Cost of Rx cannot be added to hospital bill. If financial assistance is needed, please contact unit  or ;  or  CANNOT provide pharmacy voucher for patients co-pays  5. Patients can then  the prescription on their way out of the hospital at discharge, or pharmacy can deliver to the bedside if staff is available. (payment due at time of pick-up or delivery - cash, check, or

## 2024-12-11 NOTE — PROGRESS NOTES
4 Eyes Skin Assessment     NAME:  Carina Angelo  YOB: 1945  MEDICAL RECORD NUMBER:  [id number]    The patient is being assessed for  Admission    I agree that at least one RN has performed a thorough Head to Toe Skin Assessment on the patient. ALL assessment sites listed below have been assessed.      Areas assessed by both nurses:    Head, Face, Ears, Shoulders, Back, Chest, Arms, Elbows, Hands, Sacrum. Buttock, Coccyx, Ischium, and Legs. Feet and Heels        Does the Patient have a Wound? No noted wound(s)       Abdelrahman Prevention initiated by RN: No  Wound Care Orders initiated by RN: No    Pressure Injury (Stage 3,4, Unstageable, DTI, NWPT, and Complex wounds) if present, place Wound referral order by RN under : No    New Ostomies, if present place, Ostomy referral order under : No     Nurse 1 eSignature: Electronically signed by Margot Molina RN on 12/9/24 at 5:51 PM EST    **SHARE this note so that the co-signing nurse can place an eSignature**    Nurse 2 eSignature: Electronically signed by Belen Khan RN on 12/10/24 at 7:41 AM EST    
Patient is unable to write CARTOX sentence due to baseline blurry vision. Provider notified. Will assess Q4 based on how patient is able to write name.   
Reviewed discharge instructions with patient and  spouse.  Reviewed discharge medications including dosing, schedule, indication, and adverse reactions.  Reviewed which medications were already taken today and next dosage due for each medication.      Reviewed signs and symptoms that prompt a call to the physician and appropriate phone numbers. Reviewed follow up appointments that have been made in [facility] and Outpatient Oncology.  Low microbial diet, activity restrictions, and increased risk of infection were reviewed.     Patient's peripheral IV removed with no complications. Patient sent home with all person belongings.     Patient verbalized understanding of all instructions and questions were answered to her. satisfaction. Patient discharged to home per self with spouse.      Margot Molina RN    
toxicities         The patient was seen and examined by Dr. Elliot Stroud, APRN - CNP    Ron Zarate MD  [facility]  [phone]

## 2024-12-11 NOTE — PLAN OF CARE
Problem: Chronic Conditions and Co-morbidities  Goal: Patient's chronic conditions and co-morbidity symptoms are monitored and maintained or improved  12/11/2024 0134 by Belen Khan, RN  Outcome: Progressing  Flowsheets (Taken 12/11/2024 0134)  Care Plan - Patient's Chronic Conditions and Co-Morbidity Symptoms are Monitored and Maintained or Improved:   Monitor and assess patient's chronic conditions and comorbid symptoms for stability, deterioration, or improvement   Collaborate with multidisciplinary team to address chronic and comorbid conditions and prevent exacerbation or deterioration   Update acute care plan with appropriate goals if chronic or comorbid symptoms are exacerbated and prevent overall improvement and discharge     Problem: Safety - Adult  Goal: Free from fall injury  12/11/2024 0134 by Belen Khan, RN  Outcome: Progressing  Flowsheets (Taken 12/11/2024 0134)  Free From Fall Injury:   Instruct family/caregiver on patient safety   Based on caregiver fall risk screen, instruct family/caregiver to ask for assistance with transferring infant if caregiver noted to have fall risk factors  Note: Orthostatic vital signs obtained at start of shift - see flowsheet for details.  Pt does not meet criteria for orthostasis.  Pt is a Low fall risk. See Hernandez Fall Score and ABCDS Injury Risk assessments.   - Screening for Orthostasis AND not a [place] Risk per HERNANDEZ/ABCDS: Pt bed is in low position, side rails up, call light and belongings are in reach.  Fall risk light is on outside pts room.  Pt encouraged to call for assistance as needed. Will continue with hourly rounds for PO intake, pain needs, toileting and repositioning as needed.      Problem: ABCDS Injury Assessment  Goal: Absence of physical injury  12/11/2024 0134 by Belen Khan, RN  Outcome: Progressing  Flowsheets (Taken 12/11/2024 0134)  Absence of Physical Injury: Implement safety measures based on patient assessment

## 2024-12-11 NOTE — CARE COORDINATION
Contacted patient to provide information that she will be admitted tomorrow after her Tecvalyi infusion.  Pt acknowledged.

## 2024-12-11 NOTE — DISCHARGE SUMMARY
[facility] Discharge Summary             Attending Physician: No att. providers found    Referring MD: Mitzi Maradiaga DO  [address]  [address]  [place] [address]    Name: Carina Angelo :  1945  MRN:  [id number]    Admission: 2024     Discharge: 2024       Date: 2024    Diagnosis on admit: Stage III multiple myeloma and adenocarcinoma of the lung admitted for observation following Tecvayli step up dose #1.     Procedures: Routine chest x-ray, laboratories, EKG, IV fluid hydration, Panculture for fevers, IV antimicrobial therapy, Respiratory therapy, Oxygen therapy, Blood Product Infusions    Consultations:     Medications:      Medication List        START taking these medications      fluconazole 100 MG tablet  Commonly known as: DIFLUCAN  Take 2 tablets by mouth daily for 7 days     levoFLOXacin 500 MG tablet  Commonly known as: LEVAQUIN  Take 1 tablet by mouth daily for 7 days            CONTINUE taking these medications      acyclovir 400 MG tablet  Commonly known as: ZOVIRAX     aspirin 325 MG EC tablet     atorvastatin 20 MG tablet  Commonly known as: LIPITOR  TAKE ONE TABLET BY MOUTH DAILY     FreeStyle Freedom Lite w/Device Kit  1 kit by Does not apply route daily     FreeStyle Lancets Misc  1 each by Does not apply route daily     FREESTYLE LITE strip  Generic drug: blood glucose test strips  1 each by In Vitro route daily Testing 1-2 times daily per e11.9     LORazepam 0.5 MG tablet  Commonly known as: ATIVAN  Take 1 tablet by mouth 2 times daily as needed for Anxiety for up to 30 days. Max Daily Amount: 1 mg            STOP taking these medications      busPIRone 15 MG tablet  Commonly known as: BUSPAR               Where to Get Your Medications        These medications were sent to [id number] - [place], [place] - [address] - P [phone] - F [phone] [address] [facility] [address]      Phone: [phone]   fluconazole

## 2024-12-12 ENCOUNTER — HOSPITAL ENCOUNTER (OUTPATIENT)
Age: 79
Setting detail: OBSERVATION
Discharge: HOME | End: 2024-12-14
Attending: STUDENT IN AN ORGANIZED HEALTH CARE EDUCATION/TRAINING PROGRAM | Admitting: STUDENT IN AN ORGANIZED HEALTH CARE EDUCATION/TRAINING PROGRAM
Payer: MEDICARE

## 2024-12-12 PROBLEM — C90.00 MULTIPLE MYELOMA, REMISSION STATUS UNSPECIFIED (HCC): Status: ACTIVE | Noted: 2024-12-12

## 2024-12-12 PROCEDURE — 6370000000 HC RX 637 (ALT 250 FOR IP): Performed by: STUDENT IN AN ORGANIZED HEALTH CARE EDUCATION/TRAINING PROGRAM

## 2024-12-12 PROCEDURE — 96372 THER/PROPH/DIAG INJ SC/IM: CPT

## 2024-12-12 PROCEDURE — G0379 DIRECT REFER HOSPITAL OBSERV: HCPCS

## 2024-12-12 PROCEDURE — 6360000002 HC RX W HCPCS: Performed by: NURSE PRACTITIONER

## 2024-12-12 PROCEDURE — G0378 HOSPITAL OBSERVATION PER HR: HCPCS

## 2024-12-12 PROCEDURE — 6370000000 HC RX 637 (ALT 250 FOR IP): Performed by: NURSE PRACTITIONER

## 2024-12-12 RX ORDER — LORAZEPAM 0.5 MG/1
0.5 TABLET ORAL 2 TIMES DAILY PRN
Status: DISCONTINUED | OUTPATIENT
Start: 2024-12-12 | End: 2024-12-14 | Stop reason: HOSPADM

## 2024-12-12 RX ORDER — SODIUM CHLORIDE 9 MG/ML
INJECTION, SOLUTION INTRAVENOUS CONTINUOUS PRN
Status: DISCONTINUED | OUTPATIENT
Start: 2024-12-12 | End: 2024-12-14 | Stop reason: HOSPADM

## 2024-12-12 RX ORDER — SODIUM CHLORIDE 0.9 % (FLUSH) 0.9 %
5-40 SYRINGE (ML) INJECTION EVERY 12 HOURS SCHEDULED
Status: DISCONTINUED | OUTPATIENT
Start: 2024-12-12 | End: 2024-12-14 | Stop reason: HOSPADM

## 2024-12-12 RX ORDER — SODIUM CHLORIDE 9 MG/ML
INJECTION, SOLUTION INTRAVENOUS PRN
Status: DISCONTINUED | OUTPATIENT
Start: 2024-12-12 | End: 2024-12-14 | Stop reason: HOSPADM

## 2024-12-12 RX ORDER — SODIUM CHLORIDE 0.9 % (FLUSH) 0.9 %
5-40 SYRINGE (ML) INJECTION PRN
Status: DISCONTINUED | OUTPATIENT
Start: 2024-12-12 | End: 2024-12-14 | Stop reason: HOSPADM

## 2024-12-12 RX ORDER — ATORVASTATIN CALCIUM 20 MG/1
20 TABLET, FILM COATED ORAL NIGHTLY
Status: DISCONTINUED | OUTPATIENT
Start: 2024-12-12 | End: 2024-12-14 | Stop reason: HOSPADM

## 2024-12-12 RX ORDER — ENOXAPARIN SODIUM 100 MG/ML
30 INJECTION SUBCUTANEOUS EVERY EVENING
Status: DISCONTINUED | OUTPATIENT
Start: 2024-12-12 | End: 2024-12-14 | Stop reason: HOSPADM

## 2024-12-12 RX ORDER — VALACYCLOVIR HYDROCHLORIDE 500 MG/1
500 TABLET, FILM COATED ORAL DAILY
Status: DISCONTINUED | OUTPATIENT
Start: 2024-12-13 | End: 2024-12-12

## 2024-12-12 RX ORDER — DIPHENHYDRAMINE HCL 25 MG
25 TABLET ORAL NIGHTLY PRN
Status: DISCONTINUED | OUTPATIENT
Start: 2024-12-12 | End: 2024-12-14 | Stop reason: HOSPADM

## 2024-12-12 RX ORDER — VALACYCLOVIR HYDROCHLORIDE 500 MG/1
500 TABLET, FILM COATED ORAL DAILY
Status: DISCONTINUED | OUTPATIENT
Start: 2024-12-12 | End: 2024-12-14 | Stop reason: HOSPADM

## 2024-12-12 RX ORDER — FLUCONAZOLE 200 MG/1
200 TABLET ORAL DAILY
Status: DISCONTINUED | OUTPATIENT
Start: 2024-12-12 | End: 2024-12-12

## 2024-12-12 RX ORDER — ASPIRIN 325 MG
325 TABLET, DELAYED RELEASE (ENTERIC COATED) ORAL DAILY
Status: DISCONTINUED | OUTPATIENT
Start: 2024-12-13 | End: 2024-12-14 | Stop reason: HOSPADM

## 2024-12-12 RX ORDER — VALACYCLOVIR HYDROCHLORIDE 500 MG/1
500 TABLET, FILM COATED ORAL 2 TIMES DAILY
Status: DISCONTINUED | OUTPATIENT
Start: 2024-12-12 | End: 2024-12-12

## 2024-12-12 RX ORDER — POTASSIUM CHLORIDE 29.8 MG/ML
20 INJECTION INTRAVENOUS PRN
Status: DISCONTINUED | OUTPATIENT
Start: 2024-12-12 | End: 2024-12-12 | Stop reason: ALTCHOICE

## 2024-12-12 RX ORDER — MAGNESIUM SULFATE IN WATER 40 MG/ML
4000 INJECTION, SOLUTION INTRAVENOUS PRN
Status: DISCONTINUED | OUTPATIENT
Start: 2024-12-12 | End: 2024-12-12 | Stop reason: ALTCHOICE

## 2024-12-12 RX ADMIN — ATORVASTATIN CALCIUM 20 MG: 20 TABLET, FILM COATED ORAL at 21:43

## 2024-12-12 RX ADMIN — VALACYCLOVIR HYDROCHLORIDE 500 MG: 500 TABLET, FILM COATED ORAL at 21:43

## 2024-12-12 RX ADMIN — ENOXAPARIN SODIUM 30 MG: 100 INJECTION SUBCUTANEOUS at 18:26

## 2024-12-12 RX ADMIN — LORAZEPAM 0.5 MG: 0.5 TABLET ORAL at 21:43

## 2024-12-12 NOTE — H&P
[facility] History and Physical       Attending Physician: Mitzi Maradiaga DO    Primary Care: Jacky Moss MD       Referring MD: Mitzi Maradiaga DO  [address]  [address]  [place] [address]    Name: Carina Angelo :  1945  MRN:  [id number]      Date: 2024    Reason for Admission: Observation following Tecvayli step up dose #2    History of Present Illness:     Carina Angelo is a 79 year old female with a history of carotid artery stenosis, right retinal artery occlusion, Stage III multiple myeloma and adenocarcinoma of the lung.     In regards to her multiple myeloma, she was originally diagnosed in . She was started on treatment with Sofia/Velcade/Dex followed by Sofia maintenance with progression. She was then transitioned to Vane/Pom/Dex and completed 5 cycles with disease progression.     Given her multiple relapsed multiple myeloma, she received the first step up dosing of Tecvayli on 12/10 at [facility].  She did well with initial dose and remained stable at discharge from hospital on  post-dose.  She presents today for step up dose #2 of Tecvayli.  She received dose in clinic at [facility] without issue.  She is feeling well today. She denies fevers, chills, chest pain, SOB, GI complaints, nausea, or vomiting.       Past Surgical History:   Procedure Laterality Date    BRONCHOSCOPY N/A 10/6/2023    BRONCHOSCOPY ALVEOLAR LAVAGE performed by Sergo Newell MD at [facility] ENDOSCOPY    COLONOSCOPY  Dec., 2002 (  )    Dr. Orr - luna.    COLONOSCOPY  Oct., 2013 (  )     - [place]    COLONOSCOPY N/A 2022    COLONOSCOPY POLYPECTOMY ABLATION performed by Jacky Juárez MD at [facility] ENDOSCOPY    COLONOSCOPY N/A 2022    COLONOSCOPY POLYPECTOMY SNARE/COLD BIOPSY performed by Jacky Juárez MD at [facility] ENDOSCOPY    CT BIOPSY RENAL  2022    CT BIOPSY RENAL 2022 [facility] CT SCAN    CT NEEDLE BIOPSY LUNG PERCUTANEOUS  2022    CT NEEDLE BIOPSY  pulmonary nodule  - not a transplant candidate given her age  - S/p Sofia/Velcade/Dex followed by Sofia maintence x 10 cycles with progression   - PET 10/3/23: without any lytic lesions, noted bilateral low level pulm nodules, infectious versus malignancy   - PET (4/29/24): no evidence of lytic lesions   - S/p Vane/Pom 3 mg/Dex (C1D1 5/22/24) x 5 cycles with disease progression     PLAN: Tecvayli (C1D1 12/9/24)     Step up dose #2 received today (12/12/24)      MMP:   5/8/24: LLC 81.9  5/22/24: LLC 85.0  7/22/24: LLC 94.6  8/5/24: LLC 76  9/4/24: LLc 114   10/2/24   10/30/24   12/9/24: 107.2 (baseline at start of Tecvayli)         2. ID: afebrile, no evidence of infection   - H/o H pylori on EGD s/p tx   - Cont Acyclovir ppx    - Plan to start prophy Levaquin and Diflucan with neutropenia    Hypogammaglobulinemia   - Replace for IgG <400  - IVIG last given 08/21/2024    3. Heme  - No transfusions today    4. CKD/myeloma renal disease  - Follow up with Dr. Lozano.    - Creatinine overall stable    5. Lung adenocarcinoma   - Noted incidentally on PET scan for MM  - bx suggested lung adenocarcinoma   - stage 1 likely asked on imaging    - case discussed with Dr Shaw - completed 5 treatments SBRT to the right upper lung    - CT chest with bilateral small pulmonary nodules, concern for infection, cannot rule out malignancy  - Ct chest 11/16/23 still has pneumonitis   - CT 11/2024 no evidence of progression  - Continue follow up with Dr. Newell    6. GI  - H/o of H pylori  GERD:  - GI consult. EGD with mild gastritis  - Cont protonix daily     Constipation:   - Rec miralax  Abdominal pain  - At bedtime, improves with urination, continue to follow closely  - Improved    7.  H/o retinal artery occlusion:4/2022  - Continue aspirin 325 mg daily (increased 6/12/24)      8.  Anxiety  - on 0.5 mg ativan qday prn for sleep, taking it 1 mg at times  - Start Zoloft 5/22/24 -- did not start as she is coping better    9.

## 2024-12-12 NOTE — CARE COORDINATION
Case Management Assessment  Initial Evaluation    Date/Time of Evaluation: 12/12/2024 2:15 PM  Assessment Completed by: VINCENT Martino   for [facility] ([facility])  [employer] Mobile: [phone]    If patient is discharged prior to next notation, then this note serves as note for discharge by case management.    Patient Name: Carina Angelo                   YOB: 1945  Diagnosis: Multiple myeloma in relapse (HCC) [C90.02]  Multiple myeloma, remission status unspecified (HCC) [C90.00]                   Date / Time: 12/12/2024 12:13 PM    Patient Admission Status: Observation   Readmission Risk (Low < 19, Mod (19-27), High > 27): No data recorded  Current PCP: Jacky Moss MD  PCP verified by CM? Yes ([facility])    Chart Reviewed: Yes      History Provided by: Patient  Patient Orientation: Alert and Oriented    Patient Cognition: Alert    Hospitalization in the last 30 days (Readmission):  Yes    If yes, Readmission Assessment in  Navigator will be completed.    Advance Directives:      Code Status: Full Code   Patient's Primary Decision Maker is: Patient Declined (Legal Next of Kin Remains as Decision Maker)    Primary Decision Maker: MURTAZAJUAN ALBERTO - Spouse - [phone]    Secondary Decision Maker: Gricel Ramirez - Child - [phone]    Discharge Planning:    Patient lives with: Spouse/Significant Other Type of Home: House  Primary Care Giver: Self  Patient Support Systems include: Spouse/Significant Other, Children, Family Members   Current Financial resources: Medicare ([facility])  Current community resources: None  Current services prior to admission: Other (Comment) ([place])            Current DME:              Type of Home Care services:  None    ADLS  Prior functional level: Independent in ADLs/IADLs  Current functional level: Independent in ADLs/IADLs    PT AM-PAC:   /24  OT AM-PAC:   /24    Family can provide assistance at DC:

## 2024-12-12 NOTE — PLAN OF CARE
Problem: ABCDS Injury Assessment  Goal: Absence of physical injury  Outcome: Progressing  Flowsheets (Taken 12/12/2024 1624)  Absence of Physical Injury: Implement safety measures based on patient assessment     Problem: Safety - Adult  Goal: Free from fall injury  Outcome: Progressing  Flowsheets (Taken 12/12/2024 1624)  Free From Fall Injury:   Instruct family/caregiver on patient safety   Based on caregiver fall risk screen, instruct family/caregiver to ask for assistance with transferring infant if caregiver noted to have fall risk factors  Note: Patient UAL, ortho neg.

## 2024-12-13 LAB
ALBUMIN SERPL-MCNC: 3.9 G/DL (ref 3.4–5)
ALP SERPL-CCNC: 65 U/L (ref 40–129)
ALT SERPL-CCNC: 37 U/L (ref 10–40)
ANION GAP SERPL CALCULATED.3IONS-SCNC: 13 MMOL/L (ref 3–16)
AST SERPL-CCNC: 36 U/L (ref 15–37)
BASOPHILS # BLD: 0 K/UL (ref 0–0.2)
BASOPHILS NFR BLD: 0.2 %
BILIRUB DIRECT SERPL-MCNC: <0.1 MG/DL (ref 0–0.3)
BILIRUB INDIRECT SERPL-MCNC: ABNORMAL MG/DL (ref 0–1)
BILIRUB SERPL-MCNC: <0.2 MG/DL (ref 0–1)
BUN SERPL-MCNC: 33 MG/DL (ref 7–20)
CALCIUM SERPL-MCNC: 9.4 MG/DL (ref 8.3–10.6)
CHLORIDE SERPL-SCNC: 102 MMOL/L (ref 99–110)
CO2 SERPL-SCNC: 20 MMOL/L (ref 21–32)
CREAT SERPL-MCNC: 1.6 MG/DL (ref 0.6–1.2)
CRP SERPL-MCNC: 6.2 MG/L (ref 0–5.1)
DEPRECATED RDW RBC AUTO: 17.1 % (ref 12.4–15.4)
EOSINOPHIL # BLD: 0 K/UL (ref 0–0.6)
EOSINOPHIL NFR BLD: 0 %
FERRITIN SERPL IA-MCNC: 17.7 NG/ML (ref 15–150)
GFR SERPLBLD CREATININE-BSD FMLA CKD-EPI: 33 ML/MIN/{1.73_M2}
GGT SERPL-CCNC: 22 U/L (ref 5–36)
GLUCOSE SERPL-MCNC: 148 MG/DL (ref 70–99)
HCT VFR BLD AUTO: 35.7 % (ref 36–48)
HGB BLD-MCNC: 11.4 G/DL (ref 12–16)
LDH SERPL L TO P-CCNC: 151 U/L (ref 100–190)
LYMPHOCYTES # BLD: 0.6 K/UL (ref 1–5.1)
LYMPHOCYTES NFR BLD: 8.6 %
MAGNESIUM SERPL-MCNC: 1.82 MG/DL (ref 1.8–2.4)
MCH RBC QN AUTO: 25.8 PG (ref 26–34)
MCHC RBC AUTO-ENTMCNC: 31.9 G/DL (ref 31–36)
MCV RBC AUTO: 80.7 FL (ref 80–100)
MONOCYTES # BLD: 0.5 K/UL (ref 0–1.3)
MONOCYTES NFR BLD: 7.2 %
NEUTROPHILS # BLD: 5.6 K/UL (ref 1.7–7.7)
NEUTROPHILS NFR BLD: 84 %
PHOSPHATE SERPL-MCNC: 3.5 MG/DL (ref 2.5–4.9)
PLATELET # BLD AUTO: 234 K/UL (ref 135–450)
PMV BLD AUTO: 8.6 FL (ref 5–10.5)
POTASSIUM SERPL-SCNC: 4.6 MMOL/L (ref 3.5–5.1)
PROT SERPL-MCNC: 6.1 G/DL (ref 6.4–8.2)
RBC # BLD AUTO: 4.42 M/UL (ref 4–5.2)
SODIUM SERPL-SCNC: 135 MMOL/L (ref 136–145)
URATE SERPL-MCNC: 5.1 MG/DL (ref 2.6–6)
WBC # BLD AUTO: 6.7 K/UL (ref 4–11)

## 2024-12-13 PROCEDURE — 83735 ASSAY OF MAGNESIUM: CPT

## 2024-12-13 PROCEDURE — 86140 C-REACTIVE PROTEIN: CPT

## 2024-12-13 PROCEDURE — 80048 BASIC METABOLIC PNL TOTAL CA: CPT

## 2024-12-13 PROCEDURE — 6370000000 HC RX 637 (ALT 250 FOR IP): Performed by: NURSE PRACTITIONER

## 2024-12-13 PROCEDURE — G0378 HOSPITAL OBSERVATION PER HR: HCPCS

## 2024-12-13 PROCEDURE — A4217 STERILE WATER/SALINE, 500 ML: HCPCS | Performed by: NURSE PRACTITIONER

## 2024-12-13 PROCEDURE — 80076 HEPATIC FUNCTION PANEL: CPT

## 2024-12-13 PROCEDURE — 82977 ASSAY OF GGT: CPT

## 2024-12-13 PROCEDURE — 84100 ASSAY OF PHOSPHORUS: CPT

## 2024-12-13 PROCEDURE — 85025 COMPLETE CBC W/AUTO DIFF WBC: CPT

## 2024-12-13 PROCEDURE — 2580000003 HC RX 258: Performed by: NURSE PRACTITIONER

## 2024-12-13 PROCEDURE — 96372 THER/PROPH/DIAG INJ SC/IM: CPT

## 2024-12-13 PROCEDURE — 84550 ASSAY OF BLOOD/URIC ACID: CPT

## 2024-12-13 PROCEDURE — 6370000000 HC RX 637 (ALT 250 FOR IP): Performed by: STUDENT IN AN ORGANIZED HEALTH CARE EDUCATION/TRAINING PROGRAM

## 2024-12-13 PROCEDURE — 6360000002 HC RX W HCPCS: Performed by: NURSE PRACTITIONER

## 2024-12-13 PROCEDURE — 36415 COLL VENOUS BLD VENIPUNCTURE: CPT

## 2024-12-13 PROCEDURE — 83615 LACTATE (LD) (LDH) ENZYME: CPT

## 2024-12-13 PROCEDURE — 82728 ASSAY OF FERRITIN: CPT

## 2024-12-13 RX ORDER — PANTOPRAZOLE SODIUM 20 MG/1
20 TABLET, DELAYED RELEASE ORAL
Status: DISCONTINUED | OUTPATIENT
Start: 2024-12-13 | End: 2024-12-14 | Stop reason: HOSPADM

## 2024-12-13 RX ADMIN — ASPIRIN 325 MG: 325 TABLET, COATED ORAL at 08:17

## 2024-12-13 RX ADMIN — SODIUM CHLORIDE 15 ML: 900 IRRIGANT IRRIGATION at 08:22

## 2024-12-13 RX ADMIN — VALACYCLOVIR HYDROCHLORIDE 500 MG: 500 TABLET, FILM COATED ORAL at 08:17

## 2024-12-13 RX ADMIN — LORAZEPAM 0.5 MG: 0.5 TABLET ORAL at 20:25

## 2024-12-13 RX ADMIN — ENOXAPARIN SODIUM 30 MG: 100 INJECTION SUBCUTANEOUS at 17:39

## 2024-12-13 RX ADMIN — ATORVASTATIN CALCIUM 20 MG: 20 TABLET, FILM COATED ORAL at 20:25

## 2024-12-13 RX ADMIN — SODIUM CHLORIDE, PRESERVATIVE FREE 10 ML: 5 INJECTION INTRAVENOUS at 08:18

## 2024-12-13 RX ADMIN — PANTOPRAZOLE SODIUM 20 MG: 20 TABLET, DELAYED RELEASE ORAL at 05:59

## 2024-12-13 NOTE — PLAN OF CARE
Problem: Chronic Conditions and Co-morbidities  Goal: Patient's chronic conditions and co-morbidity symptoms are monitored and maintained or improved  12/13/2024 1744 by Miranda Barber RN  Outcome: Progressing  12/13/2024 0507 by Tesha Becerra RN  Outcome: Progressing  Flowsheets (Taken 12/13/2024 0507)  Care Plan - Patient's Chronic Conditions and Co-Morbidity Symptoms are Monitored and Maintained or Improved:   Monitor and assess patient's chronic conditions and comorbid symptoms for stability, deterioration, or improvement   Collaborate with multidisciplinary team to address chronic and comorbid conditions and prevent exacerbation or deterioration   Update acute care plan with appropriate goals if chronic or comorbid symptoms are exacerbated and prevent overall improvement and discharge     Problem: ABCDS Injury Assessment  Goal: Absence of physical injury  12/13/2024 1744 by Miranda Barber RN  Outcome: Progressing  12/13/2024 0507 by Tesha Becerra RN  Outcome: Progressing  Flowsheets (Taken 12/13/2024 0507)  Absence of Physical Injury: Implement safety measures based on patient assessment     Problem: Safety - Adult  Goal: Free from fall injury  12/13/2024 1744 by Miranda Barber RN  Outcome: Progressing  12/13/2024 0507 by Tesha Becerra RN  Outcome: Progressing  Flowsheets (Taken 12/13/2024 0507)  Free From Fall Injury: Instruct family/caregiver on patient safety

## 2024-12-13 NOTE — PLAN OF CARE
Problem: Chronic Conditions and Co-morbidities  Goal: Patient's chronic conditions and co-morbidity symptoms are monitored and maintained or improved  Outcome: Progressing  Flowsheets (Taken 12/13/2024 0507)  Care Plan - Patient's Chronic Conditions and Co-Morbidity Symptoms are Monitored and Maintained or Improved:   Monitor and assess patient's chronic conditions and comorbid symptoms for stability, deterioration, or improvement   Collaborate with multidisciplinary team to address chronic and comorbid conditions and prevent exacerbation or deterioration   Update acute care plan with appropriate goals if chronic or comorbid symptoms are exacerbated and prevent overall improvement and discharge     Problem: ABCDS Injury Assessment  Goal: Absence of physical injury  12/13/2024 0507 by Tesha Becerra RN  Outcome: Progressing  Flowsheets (Taken 12/13/2024 0507)  Absence of Physical Injury: Implement safety measures based on patient assessment     Problem: Safety - Adult  Goal: Free from fall injury  12/13/2024 0507 by Tesha Becerra, RN  Outcome: Progressing  Flowsheets (Taken 12/13/2024 0507)  Free From Fall Injury: Instruct family/caregiver on patient safety

## 2024-12-13 NOTE — DISCHARGE SUMMARY
[facility] Discharge Summary             Attending Physician: Mitzi Maradiaga DO    Referring MD: Mitzi Maradiaga DO  [address] [facility] [address]  [place] [id number]    Name: Carina Angelo :  1945  MRN:  [id number]    Admission: 2024     Discharge:        Date:     Diagnosis on admit: Stage III multiple myeloma and adenocarcinoma of the lung admitted for observation following Tecvayli step up dose #2.     Procedures: Routine chest x-ray, laboratories, EKG, IV fluid hydration, Panculture for fevers, IV antimicrobial therapy, Respiratory therapy, Oxygen therapy, Blood Product Infusions    Consultations:     Medications:      Medication List        CONTINUE taking these medications      FreeStyle Freedom Lite w/Device Kit  1 kit by Does not apply route daily     FreeStyle Lancets Misc  1 each by Does not apply route daily            ASK your doctor about these medications      acyclovir 400 MG tablet  Commonly known as: ZOVIRAX     aspirin 325 MG EC tablet     atorvastatin 20 MG tablet  Commonly known as: LIPITOR  TAKE ONE TABLET BY MOUTH DAILY     fluconazole 100 MG tablet  Commonly known as: DIFLUCAN  Take 2 tablets by mouth daily for 7 days     FREESTYLE LITE strip  Generic drug: blood glucose test strips  1 each by In Vitro route daily Testing 1-2 times daily per e11.9     levoFLOXacin 500 MG tablet  Commonly known as: LEVAQUIN  Take 1 tablet by mouth daily for 7 days     LORazepam 0.5 MG tablet  Commonly known as: ATIVAN  Take 1 tablet by mouth 2 times daily as needed for Anxiety for up to 30 days. Max Daily Amount: 1 mg              Reason for Admission: Observation following Tecvayli step up dose #1     Hospital Course:   Carina Angelo is a 79 year old female with a history of carotid artery stenosis, right retinal artery occlusion, Stage III multiple myeloma and adenocarcinoma of the lung.      In regards to her multiple myeloma, she was originally diagnosed in . She was  08/21/2024    3. Heme  - No transfusion today    4. CKD/myeloma renal disease  - Followed by Dr. Lozano.       5. Lung adenocarcinoma   - Noted incidentally on PET scan for MM  - bx suggested lung adenocarcinoma   - stage 1 likely asked on imaging    - case discussed with Dr Shaw - completed 5 treatments SBRT to the right upper lung    - CT chest with bilateral small pulmonary nodules, concern for infection, cannot rule out malignancy  - Ct chest 11/16/23 still has pneumonitis   - CT 11/2024 no evidence of progression  - Continue follow up with Dr. Newell    6. GI  - H/o of H pylori  GERD:  - GI consult. EGD with mild gastritis  - Cont protonix daily     Constipation:   - Rec miralax  Abdominal pain  - At bedtime, improves with urination, continue to follow closely    7.  H/o retinal artery occlusion:4/2022  - Continue aspirin 325 mg daily (increased 6/12/24)      8.  Anxiety  - on 0.5 mg ativan qday prn for sleep, taking it 1 mg at times  - planned to start Zoloft 5/22/24 -- did not start as she is coping better    9. Ophthalmology   - Continue f/u with neuro-optho at [facility]  - Getting glasses to help    10. Cardiovascular   Hyperlipidemia   -Continue atorvastatin       Condition on discharge: Stable     Discharge Instructions:Follow-up at [facility] as previously scheduled.    The patient was advised on activity and dietary restrictions.    The patient was advised to follow up in the emergency department or contact the physician with any unresolved nausea/vomiting/diarrhea/pain or temperature greater than 100.5 F or any other unusual symptoms.       This discharge summary and plan was discussed and agreed upon with Dr. Zarate.    Chasity Stroud, CARROLL - CNP

## 2024-12-14 VITALS
WEIGHT: 132.2 LBS | SYSTOLIC BLOOD PRESSURE: 125 MMHG | TEMPERATURE: 98.2 F | HEART RATE: 90 BPM | DIASTOLIC BLOOD PRESSURE: 78 MMHG | RESPIRATION RATE: 14 BRPM | OXYGEN SATURATION: 97 % | BODY MASS INDEX: 27.64 KG/M2

## 2024-12-14 LAB
ALBUMIN SERPL-MCNC: 3.6 G/DL (ref 3.4–5)
ALP SERPL-CCNC: 72 U/L (ref 40–129)
ALT SERPL-CCNC: 48 U/L (ref 10–40)
ANION GAP SERPL CALCULATED.3IONS-SCNC: 10 MMOL/L (ref 3–16)
AST SERPL-CCNC: 42 U/L (ref 15–37)
BASOPHILS # BLD: 0 K/UL (ref 0–0.2)
BASOPHILS NFR BLD: 0.6 %
BILIRUB DIRECT SERPL-MCNC: <0.1 MG/DL (ref 0–0.3)
BILIRUB INDIRECT SERPL-MCNC: ABNORMAL MG/DL (ref 0–1)
BILIRUB SERPL-MCNC: <0.2 MG/DL (ref 0–1)
BUN SERPL-MCNC: 36 MG/DL (ref 7–20)
CALCIUM SERPL-MCNC: 9 MG/DL (ref 8.3–10.6)
CHLORIDE SERPL-SCNC: 106 MMOL/L (ref 99–110)
CO2 SERPL-SCNC: 21 MMOL/L (ref 21–32)
CREAT SERPL-MCNC: 1.7 MG/DL (ref 0.6–1.2)
CRP SERPL-MCNC: <3 MG/L (ref 0–5.1)
DEPRECATED RDW RBC AUTO: 17.2 % (ref 12.4–15.4)
EOSINOPHIL # BLD: 0.1 K/UL (ref 0–0.6)
EOSINOPHIL NFR BLD: 1 %
FERRITIN SERPL IA-MCNC: 30.8 NG/ML (ref 15–150)
GFR SERPLBLD CREATININE-BSD FMLA CKD-EPI: 30 ML/MIN/{1.73_M2}
GGT SERPL-CCNC: 23 U/L (ref 5–36)
GLUCOSE SERPL-MCNC: 89 MG/DL (ref 70–99)
HCT VFR BLD AUTO: 33.4 % (ref 36–48)
HGB BLD-MCNC: 10.7 G/DL (ref 12–16)
LDH SERPL L TO P-CCNC: 217 U/L (ref 100–190)
LYMPHOCYTES # BLD: 0.6 K/UL (ref 1–5.1)
LYMPHOCYTES NFR BLD: 8.5 %
MAGNESIUM SERPL-MCNC: 1.82 MG/DL (ref 1.8–2.4)
MCH RBC QN AUTO: 25.6 PG (ref 26–34)
MCHC RBC AUTO-ENTMCNC: 31.9 G/DL (ref 31–36)
MCV RBC AUTO: 80.2 FL (ref 80–100)
MONOCYTES # BLD: 0.7 K/UL (ref 0–1.3)
MONOCYTES NFR BLD: 10.3 %
NEUTROPHILS # BLD: 5.5 K/UL (ref 1.7–7.7)
NEUTROPHILS NFR BLD: 79.6 %
PHOSPHATE SERPL-MCNC: 3.4 MG/DL (ref 2.5–4.9)
PLATELET # BLD AUTO: 198 K/UL (ref 135–450)
PMV BLD AUTO: 8.6 FL (ref 5–10.5)
POTASSIUM SERPL-SCNC: 4.4 MMOL/L (ref 3.5–5.1)
PROT SERPL-MCNC: 5.6 G/DL (ref 6.4–8.2)
RBC # BLD AUTO: 4.17 M/UL (ref 4–5.2)
SODIUM SERPL-SCNC: 137 MMOL/L (ref 136–145)
URATE SERPL-MCNC: 5.5 MG/DL (ref 2.6–6)
WBC # BLD AUTO: 6.9 K/UL (ref 4–11)

## 2024-12-14 PROCEDURE — 6370000000 HC RX 637 (ALT 250 FOR IP): Performed by: STUDENT IN AN ORGANIZED HEALTH CARE EDUCATION/TRAINING PROGRAM

## 2024-12-14 PROCEDURE — 86140 C-REACTIVE PROTEIN: CPT

## 2024-12-14 PROCEDURE — 80048 BASIC METABOLIC PNL TOTAL CA: CPT

## 2024-12-14 PROCEDURE — 85025 COMPLETE CBC W/AUTO DIFF WBC: CPT

## 2024-12-14 PROCEDURE — 6370000000 HC RX 637 (ALT 250 FOR IP): Performed by: NURSE PRACTITIONER

## 2024-12-14 PROCEDURE — 82728 ASSAY OF FERRITIN: CPT

## 2024-12-14 PROCEDURE — G0378 HOSPITAL OBSERVATION PER HR: HCPCS

## 2024-12-14 PROCEDURE — 83615 LACTATE (LD) (LDH) ENZYME: CPT

## 2024-12-14 PROCEDURE — 36415 COLL VENOUS BLD VENIPUNCTURE: CPT

## 2024-12-14 PROCEDURE — 83735 ASSAY OF MAGNESIUM: CPT

## 2024-12-14 PROCEDURE — 82977 ASSAY OF GGT: CPT

## 2024-12-14 PROCEDURE — 80076 HEPATIC FUNCTION PANEL: CPT

## 2024-12-14 PROCEDURE — 84100 ASSAY OF PHOSPHORUS: CPT

## 2024-12-14 PROCEDURE — 84550 ASSAY OF BLOOD/URIC ACID: CPT

## 2024-12-14 PROCEDURE — 2580000003 HC RX 258: Performed by: NURSE PRACTITIONER

## 2024-12-14 RX ADMIN — ASPIRIN 325 MG: 325 TABLET, COATED ORAL at 09:07

## 2024-12-14 RX ADMIN — PANTOPRAZOLE SODIUM 20 MG: 20 TABLET, DELAYED RELEASE ORAL at 06:02

## 2024-12-14 RX ADMIN — SODIUM CHLORIDE, PRESERVATIVE FREE 10 ML: 5 INJECTION INTRAVENOUS at 09:07

## 2024-12-14 RX ADMIN — VALACYCLOVIR HYDROCHLORIDE 500 MG: 500 TABLET, FILM COATED ORAL at 09:07

## 2024-12-14 NOTE — PLAN OF CARE
Problem: Chronic Conditions and Co-morbidities  Goal: Patient's chronic conditions and co-morbidity symptoms are monitored and maintained or improved  12/14/2024 0246 by Dalia Bravo RN  Outcome: Progressing     Problem: ABCDS Injury Assessment  Goal: Absence of physical injury  12/14/2024 0246 by Dalia Bravo, RN  Outcome: Progressing     Problem: Safety - Adult  Goal: Free from fall injury  12/14/2024 0246 by Dalia Bravo RN  Outcome: Progressing

## 2024-12-14 NOTE — PROGRESS NOTES
Reviewed discharge instructions with patient and  spouse.  Reviewed discharge medications including dosing, schedule, indication, and adverse reactions.  Reviewed which medications were already taken today and next dosage due for each medication.      Patient verbalized understanding of all instructions and questions were answered to her. satisfaction. Patient discharged to home per self with spouse.      Kanchan Skelton RN

## 2024-12-14 NOTE — CARE COORDINATION
Case Management Assessment            Discharge Note                    Date / Time of Note: 12/14/2024 11:07 AM                  Discharge Note Completed by: Vic Jacobs RN    Patient Name: Carina Angelo   YOB: 1945  Diagnosis: Multiple myeloma in relapse (HCC) [C90.02]  Multiple myeloma, remission status unspecified (HCC) [C90.00]   Date / Time: 12/12/2024 12:13 PM    Current PCP: Jacky Moss MD  Clinic patient: No    Hospitalization in the last 30 days: Yes  Readmission Assessment  Number of Days since last admission?: 1-7 days  Previous Disposition: Home with Family  Who is being Interviewed: Patient  What was the patient's/caregiver's perception as to why they think they needed to return back to the hospital?: Other (Comment) (planned readmission)  Did you visit your Primary Care Physician after you left the hospital, before you returned this time?: No  Why weren't you able to visit your PCP?: Did not have an appointment  Did you see a specialist, such as Cardiac, Pulmonary, Orthopedic Physician, etc. after you left the hospital?: Yes (planned readmission)  Who advised the patient to return to the hospital?: Physician (planned readmission)  Does the patient report anything that got in the way of taking their medications?: No (planned readmission)  In our efforts to provide the best possible care to you and others like you, can you think of anything that we could have done to help you after you left the hospital the first time, so that you might not have needed to return so soon?: Other (Comment) (planned readmission)    Advance Directives:  Code Status: Full Code  [place] DNR form completed and on chart: Not Indicated    Financial:  Payor: [place] MEDICARE / Plan: ALVARO [facility] MEDICARE / Product Type: *No Product type* /      Pharmacy:    [facility] - [place], [place] - [address] - P [phone] - F [phone]  [address] [facility] [address]  Phone:

## 2024-12-14 NOTE — DISCHARGE INSTRUCTIONS
[facility] Discharge Instructions    Call for Questions/Concerns:  [phone] [facility] office  The phone number listed above is available 24 hrs/7 days per week  [facility] is open M-F 8am-4:30pm; Sat-Sun/Holidays 8am-1pm    Symptoms to Report Immediately:    Fever of 100.5 or greater  Vomiting without relief after use of anti-nausea medication  Severe abdominal cramping  Diarrhea: More than 3 loose, watery bowel movements in a 24 hour period  Unusual or excessive bleeding from your mouth, nose, rectum, bladder or vagina  Sudden onset of shortness of breath or chest pain  Signs/symptoms of infection: redness, warmth, swelling-particularly to central line site    Report to Physician's office within 24 hours:    Pain not relieved by pain medication  Change in urination-odor, cloudiness, frequency, or pain with urination  Flu-like symptoms  Skin changes-rash, hives, redness or peeling of skin    Additional Instructions:    Avoid people with colds, flu-like symptoms, or any sign of infection  Drink plenty of fluids-attempt to consume 2-3 liters ( ounces) of fluids/24 hour period  Continue low microbial diet until instructed by physician to resume normal diet  Bring all of your medications with you to your doctor's appointments  Bring your current medicine list to each hospital and office visit      12/14/2024 11:11 AM  Kanchan Skelton RN            My Discharge Checklist    Here at the [facility], we want to make sure you have the help you will need once you leave the hospital.  We are going to go over your discharge instructions with you. We give these to you in writing so you will have a reference if you have questions about symptoms or problems to look for after you leave the hospital.     We know you want to feel better and get home soon. Please answer these questions so we can be sure you have what you need, your questions are answered, and you feel prepared for

## 2024-12-14 NOTE — DISCHARGE SUMMARY
[facility] Discharge Summary             Attending Physician: Mitzi Maradiaga DO    Referring MD: Mitzi Maradiaga DO  [address] [facility] [address]  [place] [id number]    Name: Carina Angelo :  1945  MRN:  [id number]    Admission: 2024     Discharge:        Date:     Diagnosis on admit: Stage III multiple myeloma and adenocarcinoma of the lung admitted for observation following Tecvayli step up dose #2.     Procedures: Routine chest x-ray, laboratories, EKG, IV fluid hydration, Panculture for fevers, IV antimicrobial therapy, Respiratory therapy, Oxygen therapy, Blood Product Infusions    Consultations:     Medications:      Medication List        CONTINUE taking these medications      FreeStyle Freedom Lite w/Device Kit  1 kit by Does not apply route daily     FreeStyle Lancets Misc  1 each by Does not apply route daily            ASK your doctor about these medications      acyclovir 400 MG tablet  Commonly known as: ZOVIRAX     aspirin 325 MG EC tablet     atorvastatin 20 MG tablet  Commonly known as: LIPITOR  TAKE ONE TABLET BY MOUTH DAILY     fluconazole 100 MG tablet  Commonly known as: DIFLUCAN  Take 2 tablets by mouth daily for 7 days     FREESTYLE LITE strip  Generic drug: blood glucose test strips  1 each by In Vitro route daily Testing 1-2 times daily per e11.9     levoFLOXacin 500 MG tablet  Commonly known as: LEVAQUIN  Take 1 tablet by mouth daily for 7 days     LORazepam 0.5 MG tablet  Commonly known as: ATIVAN  Take 1 tablet by mouth 2 times daily as needed for Anxiety for up to 30 days. Max Daily Amount: 1 mg              Reason for Admission: Observation following Tecvayli step up dose #1     Hospital Course:   Carina Angelo is a 79 year old female with a history of carotid artery stenosis, right retinal artery occlusion, Stage III multiple myeloma and adenocarcinoma of the lung.      In regards to her multiple myeloma, she was originally diagnosed in . She was  [age]     Coags:   No results for input(s): \"PROTIME\", \"INR\", \"APTT\" in the last 72 hours.    Uric Acid No results for input(s): \"LABURIC\" in the last 72 hours.  Tacro:  No results for input(s): \"TACROLEV\" in the last 72 hours.  CMV Quant DNA by PCR: No results found for: \"CMVDNAQNT\"  IgG: No results for input(s): \"IGG\" in the last 72 hours.    PROBLEM LIST:            Multiple myeloma, lambda light chain disease, ISS 3  Lung adenocarcinoma   CKD/myeloma renal disease  H. Pylori   Retinal artery occlusion       TREATMENT:            1. Sofia/Velcade/Dex followed by Sofia maintence x 10 cycles with progression   2. Vane/Pom 3 mg/Dex (C1D1 5/22/24) x 5 cycles   3. Tecvayli (C1D1 12/9/24)    ASSESSMENT AND PLAN:           1. 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  Additionally there is aneuploidy with gain of chromosomes 1,  7, 9, 15, CCND1 and FGFR3. consistent with high risk disease  - PET scan no lytic lesion but has a RUL pulmonary nodule  - not a transplant candidate given her age  - S/p Sofia/Velcade/Dex followed by Sofia maintence x 10 cycles with progression   - PET 10/3/23: without any lytic lesions, noted bilateral low level pulm nodules, infectious versus malignancy   - PET (4/29/24): no evidence of lytic lesions   - S/p Vane/Pom 3 mg/Dex (C1D1 5/22/24) x 5 cycles with disease progression     PLAN: Tecvayli (C1D1 12/9/24)     Step up dose #2 - DAY 3     MMP:   5/8/24: LLC 81.9  5/22/24: LLC 85.0  7/22/24: LLC 94.6  8/5/24: LLC 76  9/4/24: LLc 114   10/2/24   10/30/24   12/9/24: .2    2. ID: afebrile, no evidence of infection   - H/o H pylori on EGD s/p tx   - Cont Acyclovir, Levaquin and fluconazole  ppx      Hypogammaglobulinemia   - Replace for IgG <400  - IVIG last given

## 2024-12-16 ENCOUNTER — CARE COORDINATION (OUTPATIENT)
Dept: CASE MANAGEMENT | Age: 79
End: 2024-12-16

## 2024-12-16 DIAGNOSIS — C34.91 PRIMARY ADENOCARCINOMA OF RIGHT LUNG (HCC): Primary | Chronic | ICD-10-CM

## 2024-12-16 PROCEDURE — 1111F DSCHRG MED/CURRENT MED MERGE: CPT | Performed by: FAMILY MEDICINE

## 2024-12-16 RX ORDER — LANSOPRAZOLE 30 MG/1
30 CAPSULE, DELAYED RELEASE ORAL DAILY
COMMUNITY

## 2024-12-16 NOTE — CARE COORDINATION
Care Transitions Note    Initial Call - Call within 2 business days of discharge: Yes    Patient Current Location:  Home: [address] [place] [address]    LPN Care Coordinator contacted the patient by telephone to perform post hospital discharge assessment, verified name and  as identifiers. Provided introduction to self, and explanation of the LPN Care Coordinator role.     Patient: Carina Angelo    Patient : 1945   MRN: [id number]    Reason for Admission: multiple myeloma in relapse  Discharge Date: 24  RURS: No data recorded    Last Discharge Facility       Date Complaint Diagnosis Description Type Department Provider    24   Admission (Discharged) Mitzi Balbuena, DO            Was this an external facility discharge? No    Additional needs identified to be addressed with provider   No needs identified             Method of communication with provider: none.    Patients top risk factors for readmission: medical condition-multiple myeloma    Interventions to address risk factors:   Review of patient management of conditions/medications:      Care Summary Note: LPN CC spoke with patient. States she is doing great. Active at home. T 97 \"something\", SpO2 97%. Denies pain. Glucose 85. Appetite good. Denies problems with bowels or bladder. Denies medication changes. F/u tomorrow with oncology.  to transport. Denies needs.     Thank you,   Екатерина Caballero, [facility] Care Coordinator   [facility]  Remote Patient Monitoring, Care Transitions, Ambulatory Care Management  [phone]    Plan of care updates since last contact:  Review of patient management of conditions/medications:         Advance Care Planning:   Does patient have an Advance Directive: deferred at this time, will discuss on future follow up. .    Medication Reconciliation:  Medication reconciliation was performed with patient,[id number] entered: yes.     Remote Patient Monitoring:  Offered patient

## 2024-12-17 ENCOUNTER — HOSPITAL ENCOUNTER (OUTPATIENT)
Age: 79
Setting detail: OBSERVATION
Discharge: HOME | End: 2024-12-18
Attending: STUDENT IN AN ORGANIZED HEALTH CARE EDUCATION/TRAINING PROGRAM | Admitting: INTERNAL MEDICINE
Payer: MEDICARE

## 2024-12-17 PROBLEM — Z85.79 HX OF MULTIPLE MYELOMA: Status: ACTIVE | Noted: 2024-12-17

## 2024-12-17 PROCEDURE — 6370000000 HC RX 637 (ALT 250 FOR IP)

## 2024-12-17 PROCEDURE — 6360000002 HC RX W HCPCS: Performed by: STUDENT IN AN ORGANIZED HEALTH CARE EDUCATION/TRAINING PROGRAM

## 2024-12-17 PROCEDURE — G0379 DIRECT REFER HOSPITAL OBSERV: HCPCS

## 2024-12-17 PROCEDURE — 2500000003 HC RX 250 WO HCPCS: Performed by: STUDENT IN AN ORGANIZED HEALTH CARE EDUCATION/TRAINING PROGRAM

## 2024-12-17 PROCEDURE — 93005 ELECTROCARDIOGRAM TRACING: CPT | Performed by: STUDENT IN AN ORGANIZED HEALTH CARE EDUCATION/TRAINING PROGRAM

## 2024-12-17 PROCEDURE — A4217 STERILE WATER/SALINE, 500 ML: HCPCS | Performed by: STUDENT IN AN ORGANIZED HEALTH CARE EDUCATION/TRAINING PROGRAM

## 2024-12-17 PROCEDURE — G0378 HOSPITAL OBSERVATION PER HR: HCPCS

## 2024-12-17 PROCEDURE — 6370000000 HC RX 637 (ALT 250 FOR IP): Performed by: STUDENT IN AN ORGANIZED HEALTH CARE EDUCATION/TRAINING PROGRAM

## 2024-12-17 PROCEDURE — 96372 THER/PROPH/DIAG INJ SC/IM: CPT

## 2024-12-17 RX ORDER — ENOXAPARIN SODIUM 100 MG/ML
30 INJECTION SUBCUTANEOUS DAILY
Status: DISCONTINUED | OUTPATIENT
Start: 2024-12-17 | End: 2024-12-18 | Stop reason: HOSPADM

## 2024-12-17 RX ORDER — PANTOPRAZOLE SODIUM 40 MG/1
40 TABLET, DELAYED RELEASE ORAL
Status: DISCONTINUED | OUTPATIENT
Start: 2024-12-18 | End: 2024-12-18 | Stop reason: HOSPADM

## 2024-12-17 RX ORDER — ASPIRIN 325 MG
325 TABLET, DELAYED RELEASE (ENTERIC COATED) ORAL DAILY
Status: DISCONTINUED | OUTPATIENT
Start: 2024-12-18 | End: 2024-12-18 | Stop reason: HOSPADM

## 2024-12-17 RX ORDER — LORAZEPAM 0.5 MG/1
0.5 TABLET ORAL 2 TIMES DAILY PRN
Status: DISCONTINUED | OUTPATIENT
Start: 2024-12-17 | End: 2024-12-18 | Stop reason: HOSPADM

## 2024-12-17 RX ORDER — MAGNESIUM SULFATE IN WATER 40 MG/ML
4000 INJECTION, SOLUTION INTRAVENOUS PRN
Status: DISCONTINUED | OUTPATIENT
Start: 2024-12-17 | End: 2024-12-18 | Stop reason: HOSPADM

## 2024-12-17 RX ORDER — SODIUM CHLORIDE 0.9 % (FLUSH) 0.9 %
5-40 SYRINGE (ML) INJECTION EVERY 12 HOURS SCHEDULED
Status: DISCONTINUED | OUTPATIENT
Start: 2024-12-17 | End: 2024-12-18 | Stop reason: HOSPADM

## 2024-12-17 RX ORDER — POTASSIUM CHLORIDE 7.45 MG/ML
10 INJECTION INTRAVENOUS PRN
Status: DISCONTINUED | OUTPATIENT
Start: 2024-12-17 | End: 2024-12-18 | Stop reason: HOSPADM

## 2024-12-17 RX ORDER — SODIUM CHLORIDE 0.9 % (FLUSH) 0.9 %
5-40 SYRINGE (ML) INJECTION PRN
Status: DISCONTINUED | OUTPATIENT
Start: 2024-12-17 | End: 2024-12-18 | Stop reason: HOSPADM

## 2024-12-17 RX ORDER — ACYCLOVIR 400 MG/1
400 TABLET ORAL DAILY
Status: DISCONTINUED | OUTPATIENT
Start: 2024-12-18 | End: 2024-12-18 | Stop reason: HOSPADM

## 2024-12-17 RX ORDER — ACETAMINOPHEN 325 MG/1
650 TABLET ORAL EVERY 6 HOURS PRN
Status: DISCONTINUED | OUTPATIENT
Start: 2024-12-17 | End: 2024-12-18 | Stop reason: HOSPADM

## 2024-12-17 RX ORDER — ACETAMINOPHEN 650 MG/1
650 SUPPOSITORY RECTAL EVERY 6 HOURS PRN
Status: DISCONTINUED | OUTPATIENT
Start: 2024-12-17 | End: 2024-12-18 | Stop reason: HOSPADM

## 2024-12-17 RX ORDER — ATORVASTATIN CALCIUM 20 MG/1
20 TABLET, FILM COATED ORAL DAILY
Status: DISCONTINUED | OUTPATIENT
Start: 2024-12-17 | End: 2024-12-18 | Stop reason: HOSPADM

## 2024-12-17 RX ORDER — SODIUM CHLORIDE 9 MG/ML
INJECTION, SOLUTION INTRAVENOUS PRN
Status: DISCONTINUED | OUTPATIENT
Start: 2024-12-17 | End: 2024-12-18 | Stop reason: HOSPADM

## 2024-12-17 RX ADMIN — SODIUM CHLORIDE 15 ML: 900 IRRIGANT IRRIGATION at 15:10

## 2024-12-17 RX ADMIN — SODIUM CHLORIDE, PRESERVATIVE FREE 10 ML: 5 INJECTION INTRAVENOUS at 21:07

## 2024-12-17 RX ADMIN — ATORVASTATIN CALCIUM 20 MG: 20 TABLET, FILM COATED ORAL at 21:07

## 2024-12-17 RX ADMIN — ENOXAPARIN SODIUM 30 MG: 100 INJECTION SUBCUTANEOUS at 13:19

## 2024-12-17 RX ADMIN — SODIUM CHLORIDE 15 ML: 900 IRRIGANT IRRIGATION at 13:18

## 2024-12-17 RX ADMIN — LORAZEPAM 0.5 MG: 0.5 TABLET ORAL at 21:07

## 2024-12-17 NOTE — CARE COORDINATION
Case Management Assessment  Initial Evaluation    Date/Time of Evaluation: 12/17/2024 2:54 PM  Assessment Completed by: VINCENT Martino   for [facility] [place] ([facility])  [employer] Mobile: [phone]    If patient is discharged prior to next notation, then this note serves as note for discharge by case management.    Patient Name: Carina Angelo                   YOB: 1945  Diagnosis: Multiple myeloma in relapse (HCC) [C90.02]  Hx of multiple myeloma [Z85.79]                   Date / Time: 12/17/2024 12:21 PM    Patient Admission Status: Observation   Readmission Risk (Low < 19, Mod (19-27), High > 27): No data recorded  Current PCP: Jacky Moss MD  PCP verified by ? Yes ([place])    Chart Reviewed: Yes      History Provided by: Patient  Patient Orientation: Alert and Oriented    Patient Cognition: Alert    Hospitalization in the last 30 days (Readmission):  Yes    If yes, Readmission Assessment in  Navigator will be completed.    Advance Directives:      Code Status: Full Code   Patient's Primary Decision Maker is: Patient Declined (Legal Next of Kin Remains as Decision Maker)    Primary Decision Maker: JUAN ALBERTO ANGELO - Spouse - [phone]    Secondary Decision Maker: Gricel Ramirez - Child - [phone]    Discharge Planning:    Patient lives with: Spouse/Significant Other Type of Home: House  Primary Care Giver: Self  Patient Support Systems include: Spouse/Significant Other, Children, Family Members   Current Financial resources: Medicare ([place] BCBS Medicare)  Current community resources: None  Current services prior to admission: Other (Comment) ([place])            Current DME:              Type of Home Care services:  None    ADLS  Prior functional level: Independent in ADLs/IADLs  Current functional level: Independent in ADLs/IADLs    PT AM-PAC:   /24  OT AM-PAC:   /24    Family can provide assistance at DC: Yes  Would you like Case

## 2024-12-17 NOTE — PROGRESS NOTES
4 Eyes Skin Assessment     NAME:  Carina Angelo  YOB: 1945  MEDICAL RECORD NUMBER:  [id number]    The patient is being assessed for  Admission    I agree that at least one RN has performed a thorough Head to Toe Skin Assessment on the patient. ALL assessment sites listed below have been assessed.      Areas assessed by both nurses: miranda grady    Generalized bruising    Head, Face, Ears, Shoulders, Back, Chest, Arms, Elbows, Hands, Sacrum. Buttock, Coccyx, Ischium, Legs. Feet and Heels, Under Medical Devices , and Other          Does the Patient have a Wound? No noted wound(s)       Abdelrahman Prevention initiated by RN: Yes  Wound Care Orders initiated by RN: No    Pressure Injury (Stage 3,4, Unstageable, DTI, NWPT, and Complex wounds) if present, place Wound referral order by RN under : No    New Ostomies, if present place, Ostomy referral order under : No     Nurse 1 eSignature: Electronically signed by Blanche Yancey RN on 12/17/24 at 1:31 PM EST    **SHARE this note so that the co-signing nurse can place an eSignature**    Nurse 2 eSignature: Electronically signed by Miranda Barber RN on 12/17/24 at 1:32 PM EST

## 2024-12-17 NOTE — CONSENT
Informed Consent for Blood Component Transfusion Note    I have discussed with the patient the rationale for blood component transfusion; its benefits in treating or preventing fatigue, organ damage, or death; and its risk which includes mild transfusion reactions, rare risk of blood borne infection, or more serious but rare reactions. I have discussed the alternatives to transfusion, including the risk and consequences of not receiving transfusion. The patient had an opportunity to ask questions and had agreed to proceed with transfusion of blood components.    Electronically signed by CARROLL Valles CNP on 12/17/24 at 1:38 PM EST

## 2024-12-18 VITALS
BODY MASS INDEX: 26.37 KG/M2 | HEART RATE: 84 BPM | HEIGHT: 59 IN | WEIGHT: 130.8 LBS | TEMPERATURE: 97.9 F | SYSTOLIC BLOOD PRESSURE: 138 MMHG | OXYGEN SATURATION: 98 % | DIASTOLIC BLOOD PRESSURE: 74 MMHG | RESPIRATION RATE: 14 BRPM

## 2024-12-18 LAB
ALBUMIN SERPL-MCNC: 3.7 G/DL (ref 3.4–5)
ALP SERPL-CCNC: 63 U/L (ref 40–129)
ALT SERPL-CCNC: 26 U/L (ref 10–40)
ANION GAP SERPL CALCULATED.3IONS-SCNC: 12 MMOL/L (ref 3–16)
AST SERPL-CCNC: 18 U/L (ref 15–37)
BASOPHILS # BLD: 0 K/UL (ref 0–0.2)
BASOPHILS NFR BLD: 0.2 %
BILIRUB DIRECT SERPL-MCNC: <0.1 MG/DL (ref 0–0.3)
BILIRUB INDIRECT SERPL-MCNC: ABNORMAL MG/DL (ref 0–1)
BILIRUB SERPL-MCNC: <0.2 MG/DL (ref 0–1)
BUN SERPL-MCNC: 31 MG/DL (ref 7–20)
CALCIUM SERPL-MCNC: 8.8 MG/DL (ref 8.3–10.6)
CHLORIDE SERPL-SCNC: 104 MMOL/L (ref 99–110)
CO2 SERPL-SCNC: 19 MMOL/L (ref 21–32)
CREAT SERPL-MCNC: 1.5 MG/DL (ref 0.6–1.2)
CRP SERPL-MCNC: <3 MG/L (ref 0–5.1)
DEPRECATED RDW RBC AUTO: 17.2 % (ref 12.4–15.4)
EKG ATRIAL RATE: 80 BPM
EKG DIAGNOSIS: NORMAL
EKG P-R INTERVAL: 144 MS
EKG Q-T INTERVAL: 354 MS
EKG QRS DURATION: 82 MS
EKG QTC CALCULATION (BAZETT): 408 MS
EKG R AXIS: 23 DEGREES
EKG T AXIS: 43 DEGREES
EKG VENTRICULAR RATE: 80 BPM
EOSINOPHIL # BLD: 0 K/UL (ref 0–0.6)
EOSINOPHIL NFR BLD: 0 %
FERRITIN SERPL IA-MCNC: 18.2 NG/ML (ref 15–150)
FIBRINOGEN PPP-MCNC: 411 MG/DL (ref 227–534)
GFR SERPLBLD CREATININE-BSD FMLA CKD-EPI: 35 ML/MIN/{1.73_M2}
GLUCOSE SERPL-MCNC: 144 MG/DL (ref 70–99)
HCT VFR BLD AUTO: 33.3 % (ref 36–48)
HGB BLD-MCNC: 10.7 G/DL (ref 12–16)
LDH SERPL L TO P-CCNC: 195 U/L (ref 100–190)
LYMPHOCYTES # BLD: 0.9 K/UL (ref 1–5.1)
LYMPHOCYTES NFR BLD: 9.7 %
MAGNESIUM SERPL-MCNC: 1.89 MG/DL (ref 1.8–2.4)
MCH RBC QN AUTO: 25.9 PG (ref 26–34)
MCHC RBC AUTO-ENTMCNC: 32.1 G/DL (ref 31–36)
MCV RBC AUTO: 80.7 FL (ref 80–100)
MONOCYTES # BLD: 0.6 K/UL (ref 0–1.3)
MONOCYTES NFR BLD: 6.7 %
NEUTROPHILS # BLD: 7.4 K/UL (ref 1.7–7.7)
NEUTROPHILS NFR BLD: 83.4 %
PHOSPHATE SERPL-MCNC: 2.8 MG/DL (ref 2.5–4.9)
PLATELET # BLD AUTO: 205 K/UL (ref 135–450)
PMV BLD AUTO: 9 FL (ref 5–10.5)
POTASSIUM SERPL-SCNC: 4.3 MMOL/L (ref 3.5–5.1)
PROT SERPL-MCNC: 5.8 G/DL (ref 6.4–8.2)
RBC # BLD AUTO: 4.13 M/UL (ref 4–5.2)
SODIUM SERPL-SCNC: 135 MMOL/L (ref 136–145)
URATE SERPL-MCNC: 5.1 MG/DL (ref 2.6–6)
WBC # BLD AUTO: 8.8 K/UL (ref 4–11)

## 2024-12-18 PROCEDURE — 85384 FIBRINOGEN ACTIVITY: CPT

## 2024-12-18 PROCEDURE — 82728 ASSAY OF FERRITIN: CPT

## 2024-12-18 PROCEDURE — 96372 THER/PROPH/DIAG INJ SC/IM: CPT

## 2024-12-18 PROCEDURE — 83615 LACTATE (LD) (LDH) ENZYME: CPT

## 2024-12-18 PROCEDURE — 80076 HEPATIC FUNCTION PANEL: CPT

## 2024-12-18 PROCEDURE — 80048 BASIC METABOLIC PNL TOTAL CA: CPT

## 2024-12-18 PROCEDURE — 86140 C-REACTIVE PROTEIN: CPT

## 2024-12-18 PROCEDURE — 85025 COMPLETE CBC W/AUTO DIFF WBC: CPT

## 2024-12-18 PROCEDURE — G0378 HOSPITAL OBSERVATION PER HR: HCPCS

## 2024-12-18 PROCEDURE — 6360000002 HC RX W HCPCS: Performed by: STUDENT IN AN ORGANIZED HEALTH CARE EDUCATION/TRAINING PROGRAM

## 2024-12-18 PROCEDURE — 84550 ASSAY OF BLOOD/URIC ACID: CPT

## 2024-12-18 PROCEDURE — 93010 ELECTROCARDIOGRAM REPORT: CPT | Performed by: INTERNAL MEDICINE

## 2024-12-18 PROCEDURE — 84100 ASSAY OF PHOSPHORUS: CPT

## 2024-12-18 PROCEDURE — 6370000000 HC RX 637 (ALT 250 FOR IP): Performed by: STUDENT IN AN ORGANIZED HEALTH CARE EDUCATION/TRAINING PROGRAM

## 2024-12-18 PROCEDURE — 36415 COLL VENOUS BLD VENIPUNCTURE: CPT

## 2024-12-18 PROCEDURE — 83735 ASSAY OF MAGNESIUM: CPT

## 2024-12-18 PROCEDURE — 2500000003 HC RX 250 WO HCPCS: Performed by: STUDENT IN AN ORGANIZED HEALTH CARE EDUCATION/TRAINING PROGRAM

## 2024-12-18 RX ADMIN — ENOXAPARIN SODIUM 30 MG: 100 INJECTION SUBCUTANEOUS at 08:31

## 2024-12-18 RX ADMIN — SODIUM CHLORIDE, PRESERVATIVE FREE 10 ML: 5 INJECTION INTRAVENOUS at 07:40

## 2024-12-18 RX ADMIN — PANTOPRAZOLE SODIUM 40 MG: 40 TABLET, DELAYED RELEASE ORAL at 07:39

## 2024-12-18 RX ADMIN — ACYCLOVIR 400 MG: 400 TABLET ORAL at 08:32

## 2024-12-18 RX ADMIN — ASPIRIN 325 MG: 325 TABLET, COATED ORAL at 08:32

## 2024-12-18 NOTE — PLAN OF CARE
Problem: Chronic Conditions and Co-morbidities  Goal: Patient's chronic conditions and co-morbidity symptoms are monitored and maintained or improved  Outcome: Adequate for Discharge     Problem: ABCDS Injury Assessment  Goal: Absence of physical injury  Outcome: Adequate for Discharge     Problem: Safety - Adult  Goal: Free from fall injury  Outcome: Adequate for Discharge     Problem: Neurosensory - Adult  Goal: Achieves stable or improved neurological status  Outcome: Adequate for Discharge     Problem: Skin/Tissue Integrity - Adult  Goal: Skin integrity remains intact  Outcome: Adequate for Discharge     Problem: Infection - Adult  Goal: Absence of infection at discharge  Outcome: Adequate for Discharge     Problem: Hematologic - Adult  Goal: Maintains hematologic stability  Outcome: Adequate for Discharge

## 2024-12-18 NOTE — CARE COORDINATION
Case Management Assessment            Discharge Note                    Date / Time of Note: 12/18/2024 10:05 AM                  Discharge Note Completed by: VINCENT Martino   for [facility] [place] ([facility])  [facility] Mobile: [phone]    Patient Name: Carina Angelo   YOB: 1945  Diagnosis: Multiple myeloma in relapse (HCC) [C90.02]  Hx of multiple myeloma [Z85.79]   Date / Time: 12/17/2024 12:21 PM    Current PCP: Jacky Moss MD  Clinic patient: No    Hospitalization in the last 30 days: Yes  Readmission Assessment  Number of Days since last admission?: 1-7 days  Previous Disposition: Home with Family  Who is being Interviewed:  (planned readmission)  What was the patient's/caregiver's perception as to why they think they needed to return back to the hospital?: Other (Comment) (planned readmission)  Did you visit your Primary Care Physician after you left the hospital, before you returned this time?: No  Why weren't you able to visit your PCP?: Did not have an appointment (planned readmission)  Did you see a specialist, such as Cardiac, Pulmonary, Orthopedic Physician, etc. after you left the hospital?: Yes (planned readmission)  Who advised the patient to return to the hospital?: Physician (planned readmission)  Does the patient report anything that got in the way of taking their medications?:  (planned readmission)  In our efforts to provide the best possible care to you and others like you, can you think of anything that we could have done to help you after you left the hospital the first time, so that you might not have needed to return so soon?: Other (Comment) (planned readmission)    Advance Directives:  Code Status: Full Code  [place] DNR form completed and on chart: Not Indicated    Financial:  Payor: [place] BCBS MEDICARE / Plan: [employer] MEDICARE / Product Type: *No Product type* /      Pharmacy:    [facility] -

## 2024-12-18 NOTE — DISCHARGE SUMMARY
lesions rashes or masses  CHEST: CTA bilaterally without use of accessory muscles  CV: Normal S1 S2, RRR, no MRG  ABD: NT ND normoactive BS, no palpable masses or hepatosplenomegaly  EXTREMITIES: without edema, denies calf tenderness  NEURO: CN II - XII grossly intact  CATHETER: PIV    Discharge Laboratory Data:  CBC:   Recent Labs     12/18/24  0354   WBC 8.8   HGB 10.7*   HCT 33.3*   MCV 80.7        BMP/Mag:  Recent Labs     12/18/24  0354   *   K 4.3      CO2 19*   PHOS 2.8   BUN 31*   CREATININE 1.5*   MG 1.89     LIVP:   Recent Labs     12/18/24  0354   AST 18   ALT 26   BILIDIR <0.1   BILITOT <0.2   ALKPHOS 63     Coags:   No results for input(s): \"PROTIME\", \"INR\", \"APTT\" in the last 72 hours.    Uric Acid No results for input(s): \"LABURIC\" in the last 72 hours.  Tacro:  No results for input(s): \"TACROLEV\" in the last 72 hours.  CMV Quant DNA by PCR: No results found for: \"CMVDNAQNT\"  IgG: No results for input(s): \"IGG\" in the last 72 hours.    PROBLEM LIST:            Multiple myeloma, lambda light chain disease, ISS 3  Lung adenocarcinoma   CKD/myeloma renal disease  H. Pylori   Retinal artery occlusion       TREATMENT:            1. Sofia/Velcade/Dex followed by Sofia maintence x 10 cycles with progression   2. Vane/Pom 3 mg/Dex (C1D1 5/22/24) x 5 cycles   3. Tecvayli (C1D1 12/9/24)    ASSESSMENT AND PLAN:           1. 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  Additionally there is aneuploidy with gain of chromosomes 1,  7, 9, 15, CCND1 and FGFR3. consistent with high risk disease  - PET scan no lytic lesion but has a RUL pulmonary nodule  - not a transplant candidate given her age  - S/p Sofia/Velcade/Dex followed by Sofia maintence x 10 cycles with progression   - PET 10/3/23:

## 2024-12-18 NOTE — DISCHARGE INSTRUCTIONS
[facility] Tecvayli  Bi[facility] Therapy Discharge Instructions    Call for Questions/Concerns:  [phone]) [facility] office  The phone number listed above is available 24 hrs/7 days per week    If you experience issues reaching the On-Call physician in a timely manner? Please call the [facility] unit and speak to the Charge RN- [phone]    [facility] is open M-F 8am-4:30pm; Sat-Sun/Holidays 8am-1pm    Symptoms to Report Immediately:    Fever of 100.4 or greater or chills and shaking present or when [employer] alerts you that you have a fever.r  Signs or symptoms of CRS: racing heart, hypoxia (Shortness of breath) low blood pressure, achy, joint pain, muscle stiffness  Any signs of Neurotoxicity: Confusion, Subtle changes in mental status, dizziness, lightheadedness, Headache  Vomiting without relief after use of anti-nausea medication  Severe abdominal cramping or muscle/joint pain  Diarrhea: More than 3 loose, watery bowel movements in a 24 hour period  Unusual or excessive bleeding from your mouth, nose, rectum, bladder or vagina  Sudden onset of shortness of breath or chest pain  Signs/symptoms of infection: redness, warmth, swelling-particularly to central line site    Report to Physician's office within 24 hours:    Pain not relieved by pain medication  Change in urination-odor, cloudiness, frequency, or pain with urination  Flu-like symptoms  Skin changes-rash, hives, redness or peeling of skin    Additional Instructions:    Avoid people with colds, flu-like symptoms, or any sign of infection  Drink plenty of fluids-attempt to consume 2-3 liters ( ounces) of fluids/24 hour period  Continue low microbial diet until instructed by physician to resume normal diet  Bring all of your medications with you to your doctor's appointments  Bring your current medicine list to each hospital and office visit              12/18/2024 10:09 AM  Jaquelin Steven    If you have any questions after you get home, feel free

## 2024-12-18 NOTE — PLAN OF CARE
Problem: Safety - Adult  Goal: Free from fall injury  12/17/2024 1922 by Blanche Yancey RN  Outcome: Progressing  12/17/2024 1904 by Blanche Yancey RN  Outcome: Progressing    Pt with activity orders for up ad april.  Encouraged pt to be up OOB as much as possible throughout the day and for all meals.  Encouraged frequent short naps as necessary to preserve energy but instructed that while awake, pt should be OOB.    Encouraged pt to ambulate in halls.  Pt seen up ad april in halls once this shift. Pt is visualized to be OOB 26-50% of the time this shift.  Will continue to encourage frequent activity.      Problem: Neurosensory - Adult  Goal: Achieves stable or improved neurological status  12/17/2024 1922 by Blanche Yancey RN  Outcome: Progressing   Patient car-t assessment 10/10 today.     Problem: Skin/Tissue Integrity - Adult  Goal: Skin integrity remains intact  12/17/2024 1922 by Blanche Yancey RN  Outcome: Progressing  12/17/2024 1904 by Blanche Yancey RN  Outcome: Progressing   Bandaid for tecvayli clean dry intact.     Problem: Hematologic - Adult  Goal: Maintains hematologic stability  12/17/2024 1922 by Blanche Yancey, RN  Outcome: Progressing   Patient's hemoglobin this AM: No results for input(s): \"HGB\" in the last 72 hours.  Patient's platelet count this AM: No results for input(s): \"PLT\" in the last 72 hours. Thrombocytopenia Precautions in place.  Patient showing no signs or symptoms of active bleeding.  Transfusion not indicated at this time.  Patient verbalizes understanding of all instructions. Will continue to assess and implement POC. Call light within reach and hourly rounding in place.

## 2024-12-18 NOTE — H&P
[facility] History and Physical       Attending Physician: Ron Zarate, *    Primary Care: Jacky Moss MD       Referring MD: Mitzi Maradiaga DO  [address]   [address]  [place] [address]    Name: Carina Angelo :  1945  MRN:  [id number]      Date: 2024    Reason for Admission: Observation following Tecvayli step up dose # 3    History of Present Illness:     Carina Angelo is a 79 year old female with a history of carotid artery stenosis, right retinal artery occlusion, Stage III multiple myeloma and adenocarcinoma of the lung.     In regards to her multiple myeloma, she was originally diagnosed in . She was started on treatment with Sofia/Velcade/Dex followed by Sofia maintenance with progression. She was then transitioned to Vane/Pom/Dex and completed 5 cycles with disease progression.     Given her multiple relapsed multiple myeloma, she received the first step up dosing of Tecvayli on 12/10 and second on   with 48 hrs inpt monitoring.  She did well with both doses. Will admit for observation today and plan for 24 hrs if no CRS can dc tomorrow    Past Surgical History:   Procedure Laterality Date    BRONCHOSCOPY N/A 10/06/2023    BRONCHOSCOPY ALVEOLAR LAVAGE performed by Sergo Newell MD at [facility] ENDOSCOPY    COLONOSCOPY  Dec., 2002 (  )    Dr. Orr - normal.    COLONOSCOPY  Oct., 2013 (  )     - Normal    COLONOSCOPY N/A 2022    COLONOSCOPY POLYPECTOMY ABLATION performed by Jacky Juárez MD at [facility] ENDOSCOPY    COLONOSCOPY N/A 2022    COLONOSCOPY POLYPECTOMY SNARE/COLD BIOPSY performed by Jacky Juárez MD at [facility] ENDOSCOPY    CT BIOPSY RENAL  2022    CT BIOPSY RENAL 2022 [facility] CT SCAN    CT NEEDLE BIOPSY LUNG PERCUTANEOUS W IMAGING GUIDANCE  2022    CT NEEDLE BIOPSY LUNG PERCUTANEOUS 2022 [facility] CT SCAN    CYSTOSCOPY  *May 14, 2020    Dr. Donnelly - negative    ENDOSCOPY, COLON, DIAGNOSTIC

## 2024-12-18 NOTE — PROGRESS NOTES
Reviewed discharge instructions with patient and  spouse.  Reviewed discharge medications including dosing, schedule, indication, and adverse reactions.  Reviewed which medications were already taken today and next dosage due for each medication.      Reviewed signs and symptoms that prompt a call to the physician and appropriate phone numbers. Purple ER card given to the patient with explanations of its use.  Reviewed follow up appointments that have been made in [facility] and Outpatient Oncology.      Patient verbalized understanding of all instructions and questions were answered to her. satisfaction. Patient discharged to home per wheelchair with spouse.      BRAYDEN CHANG RN

## 2025-01-15 DIAGNOSIS — F41.9 ANXIETY: Primary | ICD-10-CM

## 2025-01-15 RX ORDER — BLOOD SUGAR DIAGNOSTIC
STRIP MISCELLANEOUS
Qty: 50 STRIP | Refills: 11 | Status: SHIPPED | OUTPATIENT
Start: 2025-01-15

## 2025-01-15 RX ORDER — LORAZEPAM 0.5 MG/1
TABLET ORAL
Qty: 60 TABLET | Refills: 0 | Status: SHIPPED | OUTPATIENT
Start: 2025-01-15 | End: 2025-02-14

## 2025-03-01 RX ORDER — ATORVASTATIN CALCIUM 20 MG/1
TABLET, FILM COATED ORAL
Qty: 90 TABLET | Refills: 3 | Status: SHIPPED | OUTPATIENT
Start: 2025-03-01

## 2025-03-24 RX ORDER — LORAZEPAM 0.5 MG/1
TABLET ORAL
Qty: 60 TABLET | Refills: 4 | OUTPATIENT
Start: 2025-03-24

## 2025-03-25 ENCOUNTER — OFFICE VISIT (OUTPATIENT)
Dept: FAMILY MEDICINE CLINIC | Age: 80
End: 2025-03-25
Payer: MEDICARE

## 2025-03-25 VITALS
WEIGHT: 133 LBS | BODY MASS INDEX: 26.11 KG/M2 | HEART RATE: 96 BPM | HEIGHT: 60 IN | SYSTOLIC BLOOD PRESSURE: 120 MMHG | OXYGEN SATURATION: 99 % | DIASTOLIC BLOOD PRESSURE: 82 MMHG

## 2025-03-25 DIAGNOSIS — F41.9 ANXIETY: Primary | ICD-10-CM

## 2025-03-25 DIAGNOSIS — F41.9 ANXIETY: ICD-10-CM

## 2025-03-25 PROCEDURE — 99213 OFFICE O/P EST LOW 20 MIN: CPT | Performed by: STUDENT IN AN ORGANIZED HEALTH CARE EDUCATION/TRAINING PROGRAM

## 2025-03-25 PROCEDURE — 1160F RVW MEDS BY RX/DR IN RCRD: CPT | Performed by: STUDENT IN AN ORGANIZED HEALTH CARE EDUCATION/TRAINING PROGRAM

## 2025-03-25 PROCEDURE — 1159F MED LIST DOCD IN RCRD: CPT | Performed by: STUDENT IN AN ORGANIZED HEALTH CARE EDUCATION/TRAINING PROGRAM

## 2025-03-25 PROCEDURE — G2211 COMPLEX E/M VISIT ADD ON: HCPCS | Performed by: STUDENT IN AN ORGANIZED HEALTH CARE EDUCATION/TRAINING PROGRAM

## 2025-03-25 PROCEDURE — 1124F ACP DISCUSS-NO DSCNMKR DOCD: CPT | Performed by: STUDENT IN AN ORGANIZED HEALTH CARE EDUCATION/TRAINING PROGRAM

## 2025-03-25 ASSESSMENT — PATIENT HEALTH QUESTIONNAIRE - PHQ9
2. FEELING DOWN, DEPRESSED OR HOPELESS: NOT AT ALL
SUM OF ALL RESPONSES TO PHQ QUESTIONS 1-9: 0
1. LITTLE INTEREST OR PLEASURE IN DOING THINGS: NOT AT ALL
SUM OF ALL RESPONSES TO PHQ QUESTIONS 1-9: 0

## 2025-03-25 ASSESSMENT — ENCOUNTER SYMPTOMS
ABDOMINAL PAIN: 0
SHORTNESS OF BREATH: 0

## 2025-03-25 NOTE — PROGRESS NOTES
Carina Angelo is a 79 y.o.female who presents with   Chief Complaint   Patient presents with    Medication Check   .    History of Present Illness      Patient presents for follow-up regarding anxiety management with benzodiazepine therapy. Reports that symptoms are stable and well-controlled on current regimen. No breakthrough anxiety or panic episodes. Denies sedation, cognitive changes, misuse, or dependency behaviors. No dose escalation or early refill requests. Functioning well in daily life.        Review of Systems   Constitutional:  Negative for fatigue.   Eyes:  Negative for visual disturbance.   Respiratory:  Negative for shortness of breath.    Cardiovascular:  Negative for chest pain.   Gastrointestinal:  Negative for abdominal pain.   Skin:  Negative for rash.   Neurological:  Negative for dizziness, light-headedness and headaches.        Past Medical History:   Diagnosis Date    THADDEUS (acute kidney injury) 07/02/2022    Anxiety 08/04/2022    Bilateral carotid artery stenosis 04/14/2022    Bilateral carotid artery stenosis 04/14/2022    Encounter for cervical Pap smear with pelvic exam *Oct. 11,2019    Negative     Hyperlipidemia     Multiple myeloma (HCC) 07/06/2022    Osteoporosis DEXA - Sept., 2011    Lumbar T score -2.5 and frm neck -1.8    Screening mammogram for high-risk patient 11/10/2011    Negative    Screening mammogram, encounter for *March 8, 2017    Negative    Screening mammogram, encounter for *March 14, 2018    Negative    Screening mammogram, encounter for *March 20, 2019    Negative    Screening mammogram, encounter for *Leidy 15, 2020    Negative    Vitamin D deficiency     Wrist fracture, left 03/2016    Left       Past Surgical History:   Procedure Laterality Date    BRONCHOSCOPY N/A 10/06/2023    BRONCHOSCOPY ALVEOLAR LAVAGE performed by Sergo Newell MD at [facility] ENDOSCOPY    COLONOSCOPY  Dec., 2002 ( 2012 )    Dr. Orr - normal.    COLONOSCOPY  Oct., 2013 ( 2023 )

## 2025-03-26 RX ORDER — LORAZEPAM 0.5 MG/1
TABLET ORAL
Qty: 60 TABLET | Refills: 4 | Status: SHIPPED | OUTPATIENT
Start: 2025-03-26 | End: 2025-04-25

## 2025-04-04 DIAGNOSIS — F41.9 ANXIETY: ICD-10-CM

## 2025-04-04 LAB
25(OH)D3 SERPL-MCNC: 42.1 NG/ML
CHOLEST SERPL-MCNC: 228 MG/DL (ref 0–199)
EST. AVERAGE GLUCOSE BLD GHB EST-MCNC: 111.2 MG/DL
FOLATE SERPL-MCNC: 15.7 NG/ML (ref 4.78–24.2)
HBA1C MFR BLD: 5.5 %
HDLC SERPL-MCNC: 57 MG/DL (ref 40–60)
LDLC SERPL CALC-MCNC: 142 MG/DL
TRIGL SERPL-MCNC: 145 MG/DL (ref 0–150)
TSH SERPL DL<=0.005 MIU/L-ACNC: 1.68 UIU/ML (ref 0.27–4.2)
VIT B12 SERPL-MCNC: 674 PG/ML (ref 211–911)
VLDLC SERPL CALC-MCNC: 29 MG/DL

## 2025-04-07 ENCOUNTER — RESULTS FOLLOW-UP (OUTPATIENT)
Dept: FAMILY MEDICINE CLINIC | Age: 80
End: 2025-04-07

## 2025-04-08 RX ORDER — ATORVASTATIN CALCIUM 40 MG/1
40 TABLET, FILM COATED ORAL DAILY
Qty: 30 TABLET | Refills: 3 | Status: SHIPPED | OUTPATIENT
Start: 2025-04-08

## 2025-04-09 ENCOUNTER — TELEPHONE (OUTPATIENT)
Dept: FAMILY MEDICINE CLINIC | Age: 80
End: 2025-04-09

## 2025-04-09 NOTE — TELEPHONE ENCOUNTER
Patient called with a \"medical question\" that she is requesting only Dr. Moss to call her with. I explained I can send a message to clinical staff with her concerns. She is persistent to only speak to Dr. Moss with her medical question and she would not elaborate any details as to what it was regarding.    Her phone number is [phone].

## 2025-06-12 ENCOUNTER — OFFICE VISIT (OUTPATIENT)
Dept: FAMILY MEDICINE CLINIC | Age: 80
End: 2025-06-12
Payer: MEDICARE

## 2025-06-12 VITALS
HEIGHT: 60 IN | WEIGHT: 128 LBS | SYSTOLIC BLOOD PRESSURE: 120 MMHG | OXYGEN SATURATION: 98 % | DIASTOLIC BLOOD PRESSURE: 80 MMHG | HEART RATE: 93 BPM | BODY MASS INDEX: 25.13 KG/M2

## 2025-06-12 DIAGNOSIS — Z23 NEED FOR PNEUMOCOCCAL VACCINATION: ICD-10-CM

## 2025-06-12 DIAGNOSIS — F41.9 ANXIETY: Chronic | ICD-10-CM

## 2025-06-12 DIAGNOSIS — N17.9 ACUTE RENAL FAILURE SUPERIMPOSED ON STAGE 4 CHRONIC KIDNEY DISEASE, UNSPECIFIED ACUTE RENAL FAILURE TYPE (HCC): ICD-10-CM

## 2025-06-12 DIAGNOSIS — C90.00 MULTIPLE MYELOMA, REMISSION STATUS UNSPECIFIED (HCC): Chronic | ICD-10-CM

## 2025-06-12 DIAGNOSIS — Z00.00 MEDICARE ANNUAL WELLNESS VISIT, SUBSEQUENT: Primary | ICD-10-CM

## 2025-06-12 DIAGNOSIS — E11.65 TYPE 2 DIABETES MELLITUS WITH HYPERGLYCEMIA, WITHOUT LONG-TERM CURRENT USE OF INSULIN (HCC): Chronic | ICD-10-CM

## 2025-06-12 DIAGNOSIS — C34.91 PRIMARY ADENOCARCINOMA OF RIGHT LUNG (HCC): Chronic | ICD-10-CM

## 2025-06-12 DIAGNOSIS — N18.4 ACUTE RENAL FAILURE SUPERIMPOSED ON STAGE 4 CHRONIC KIDNEY DISEASE, UNSPECIFIED ACUTE RENAL FAILURE TYPE (HCC): ICD-10-CM

## 2025-06-12 PROCEDURE — 1124F ACP DISCUSS-NO DSCNMKR DOCD: CPT | Performed by: FAMILY MEDICINE

## 2025-06-12 PROCEDURE — 3044F HG A1C LEVEL LT 7.0%: CPT | Performed by: FAMILY MEDICINE

## 2025-06-12 PROCEDURE — 90677 PCV20 VACCINE IM: CPT | Performed by: FAMILY MEDICINE

## 2025-06-12 PROCEDURE — 1159F MED LIST DOCD IN RCRD: CPT | Performed by: FAMILY MEDICINE

## 2025-06-12 PROCEDURE — G0439 PPPS, SUBSEQ VISIT: HCPCS | Performed by: FAMILY MEDICINE

## 2025-06-12 PROCEDURE — G0009 ADMIN PNEUMOCOCCAL VACCINE: HCPCS | Performed by: FAMILY MEDICINE

## 2025-06-12 ASSESSMENT — LIFESTYLE VARIABLES
HOW OFTEN DO YOU HAVE A DRINK CONTAINING ALCOHOL: NEVER
HOW MANY STANDARD DRINKS CONTAINING ALCOHOL DO YOU HAVE ON A TYPICAL DAY: PATIENT DOES NOT DRINK

## 2025-06-12 ASSESSMENT — PATIENT HEALTH QUESTIONNAIRE - PHQ9
1. LITTLE INTEREST OR PLEASURE IN DOING THINGS: NOT AT ALL
SUM OF ALL RESPONSES TO PHQ QUESTIONS 1-9: 0
SUM OF ALL RESPONSES TO PHQ QUESTIONS 1-9: 0
2. FEELING DOWN, DEPRESSED OR HOPELESS: NOT AT ALL
SUM OF ALL RESPONSES TO PHQ QUESTIONS 1-9: 0
SUM OF ALL RESPONSES TO PHQ QUESTIONS 1-9: 0

## 2025-06-12 NOTE — PROGRESS NOTES
patient instructions/AVS.  Recommended screening schedule for the next 5-10 years is provided to the patient in written form: see Patient Instructions/AVS.     Reviewed and updated this visit:  Tobacco  Allergies  Meds  Sexual Hx

## 2025-06-23 DIAGNOSIS — E11.65 TYPE 2 DIABETES MELLITUS WITH HYPERGLYCEMIA, WITHOUT LONG-TERM CURRENT USE OF INSULIN (HCC): Chronic | ICD-10-CM

## 2025-06-23 LAB
ALBUMIN SERPL-MCNC: 4 G/DL (ref 3.4–5)
ALBUMIN/GLOB SERPL: 1.9 {RATIO} (ref 1.1–2.2)
ALP SERPL-CCNC: 76 U/L (ref 40–129)
ALT SERPL-CCNC: 33 U/L (ref 10–40)
ANION GAP SERPL CALCULATED.3IONS-SCNC: 11 MMOL/L (ref 3–16)
AST SERPL-CCNC: 34 U/L (ref 15–37)
BASOPHILS # BLD: 0.1 K/UL (ref 0–0.2)
BASOPHILS NFR BLD: 2 %
BILIRUB SERPL-MCNC: 0.3 MG/DL (ref 0–1)
BUN SERPL-MCNC: 32 MG/DL (ref 7–20)
CALCIUM SERPL-MCNC: 9.6 MG/DL (ref 8.3–10.6)
CHLORIDE SERPL-SCNC: 105 MMOL/L (ref 99–110)
CHOLEST SERPL-MCNC: 193 MG/DL (ref 0–199)
CO2 SERPL-SCNC: 24 MMOL/L (ref 21–32)
CREAT SERPL-MCNC: 1.4 MG/DL (ref 0.6–1.2)
DEPRECATED RDW RBC AUTO: 14.1 % (ref 12.4–15.4)
EOSINOPHIL # BLD: 0.2 K/UL (ref 0–0.6)
EOSINOPHIL NFR BLD: 5 %
GFR SERPLBLD CREATININE-BSD FMLA CKD-EPI: 38 ML/MIN/{1.73_M2}
GLUCOSE SERPL-MCNC: 99 MG/DL (ref 70–99)
HCT VFR BLD AUTO: 43.4 % (ref 36–48)
HDLC SERPL-MCNC: 48 MG/DL (ref 40–60)
HGB BLD-MCNC: 14.1 G/DL (ref 12–16)
LDLC SERPL CALC-MCNC: 117 MG/DL
LYMPHOCYTES # BLD: 1.4 K/UL (ref 1–5.1)
LYMPHOCYTES NFR BLD: 35 %
MCH RBC QN AUTO: 27.3 PG (ref 26–34)
MCHC RBC AUTO-ENTMCNC: 32.5 G/DL (ref 31–36)
MCV RBC AUTO: 84.1 FL (ref 80–100)
MONOCYTES # BLD: 0.7 K/UL (ref 0–1.3)
MONOCYTES NFR BLD: 19 %
NEUTROPHILS # BLD: 1.5 K/UL (ref 1.7–7.7)
NEUTROPHILS NFR BLD: 38 %
NEUTS BAND NFR BLD MANUAL: 1 % (ref 0–7)
PLATELET # BLD AUTO: 173 K/UL (ref 135–450)
PMV BLD AUTO: 8.7 FL (ref 5–10.5)
POTASSIUM SERPL-SCNC: 4.8 MMOL/L (ref 3.5–5.1)
PROT SERPL-MCNC: 6.1 G/DL (ref 6.4–8.2)
RBC # BLD AUTO: 5.16 M/UL (ref 4–5.2)
RBC MORPH BLD: NORMAL
SODIUM SERPL-SCNC: 140 MMOL/L (ref 136–145)
TRIGL SERPL-MCNC: 142 MG/DL (ref 0–150)
VLDLC SERPL CALC-MCNC: 28 MG/DL
WBC # BLD AUTO: 3.9 K/UL (ref 4–11)

## 2025-06-24 ENCOUNTER — RESULTS FOLLOW-UP (OUTPATIENT)
Dept: FAMILY MEDICINE CLINIC | Age: 80
End: 2025-06-24

## 2025-06-30 RX ORDER — ATORVASTATIN CALCIUM 40 MG/1
40 TABLET, FILM COATED ORAL DAILY
Qty: 90 TABLET | Refills: 11 | Status: SHIPPED | OUTPATIENT
Start: 2025-06-30

## 2025-07-28 RX ORDER — LANCETS 33 GAUGE
EACH MISCELLANEOUS
Qty: 100 EACH | Refills: 11 | Status: SHIPPED | OUTPATIENT
Start: 2025-07-28

## 2025-08-25 ENCOUNTER — OFFICE VISIT (OUTPATIENT)
Dept: FAMILY MEDICINE CLINIC | Age: 80
End: 2025-08-25
Payer: MEDICARE

## 2025-08-25 VITALS
BODY MASS INDEX: 25.02 KG/M2 | OXYGEN SATURATION: 100 % | HEART RATE: 68 BPM | SYSTOLIC BLOOD PRESSURE: 122 MMHG | WEIGHT: 126.4 LBS | DIASTOLIC BLOOD PRESSURE: 82 MMHG

## 2025-08-25 DIAGNOSIS — Z79.899 ENCOUNTER FOR LONG-TERM CURRENT USE OF MEDICATION: ICD-10-CM

## 2025-08-25 DIAGNOSIS — F41.9 ANXIETY: Primary | ICD-10-CM

## 2025-08-25 PROCEDURE — 99214 OFFICE O/P EST MOD 30 MIN: CPT

## 2025-08-25 PROCEDURE — 1159F MED LIST DOCD IN RCRD: CPT

## 2025-08-25 PROCEDURE — 1160F RVW MEDS BY RX/DR IN RCRD: CPT

## 2025-08-25 PROCEDURE — 1124F ACP DISCUSS-NO DSCNMKR DOCD: CPT

## 2025-08-25 ASSESSMENT — ENCOUNTER SYMPTOMS
WHEEZING: 0
PHOTOPHOBIA: 0
SHORTNESS OF BREATH: 0
COUGH: 0
CHEST TIGHTNESS: 0

## 2025-08-26 LAB
AMPHETAMINES UR QL SCN>1000 NG/ML: NORMAL
BARBITURATES UR QL SCN>200 NG/ML: NORMAL
BENZODIAZ UR QL SCN>200 NG/ML: NORMAL
CANNABINOIDS UR QL SCN>50 NG/ML: NORMAL
COCAINE UR QL SCN: NORMAL
DRUG SCREEN COMMENT UR-IMP: NORMAL
FENTANYL SCREEN, URINE: NORMAL
METHADONE UR QL SCN>300 NG/ML: NORMAL
OPIATES UR QL SCN>300 NG/ML: NORMAL
OXYCODONE UR QL SCN: NORMAL
PCP UR QL SCN>25 NG/ML: NORMAL
PH UR STRIP: 5.5 [PH]

## (undated) DEVICE — TRAP SPEC RETRV CLR PLAS POLYP IN LN SUCT QUIK CTCH

## (undated) DEVICE — FORCEPS BX L240CM JAW DIA2.8MM L CAP W/ NDL MIC MESH TOOTH

## (undated) DEVICE — TRAP SPEC 80ML MUCUS

## (undated) DEVICE — Z DISCONTINUED USE 2749457 TUBING SAMP AD W12.5XH8.4IN D9.1IN NSL ORAL SMRT CAPNOLINE

## (undated) DEVICE — CANNULA SAMP CO2 AD GRN 7FT CO2 AND 7FT O2 TBNG UNIV CONN

## (undated) DEVICE — SNARE ENDOSCP L240CM DIA2.4MM LOOP W20MM RND INSUL STIFF